# Patient Record
Sex: FEMALE | Race: ASIAN | NOT HISPANIC OR LATINO
[De-identification: names, ages, dates, MRNs, and addresses within clinical notes are randomized per-mention and may not be internally consistent; named-entity substitution may affect disease eponyms.]

---

## 2018-03-21 ENCOUNTER — LABORATORY RESULT (OUTPATIENT)
Age: 48
End: 2018-03-21

## 2018-03-22 ENCOUNTER — ASOB RESULT (OUTPATIENT)
Age: 48
End: 2018-03-22

## 2018-03-22 ENCOUNTER — APPOINTMENT (OUTPATIENT)
Dept: OBGYN | Facility: CLINIC | Age: 48
End: 2018-03-22
Payer: COMMERCIAL

## 2018-03-22 VITALS
HEIGHT: 64 IN | BODY MASS INDEX: 26.12 KG/M2 | WEIGHT: 153 LBS | DIASTOLIC BLOOD PRESSURE: 80 MMHG | SYSTOLIC BLOOD PRESSURE: 130 MMHG

## 2018-03-22 DIAGNOSIS — R10.2 PELVIC AND PERINEAL PAIN: ICD-10-CM

## 2018-03-22 PROCEDURE — 99396 PREV VISIT EST AGE 40-64: CPT

## 2018-03-22 PROCEDURE — 76830 TRANSVAGINAL US NON-OB: CPT

## 2018-03-22 PROCEDURE — 99212 OFFICE O/P EST SF 10 MIN: CPT | Mod: 25

## 2018-03-28 ENCOUNTER — APPOINTMENT (OUTPATIENT)
Dept: OBGYN | Facility: CLINIC | Age: 48
End: 2018-03-28
Payer: COMMERCIAL

## 2018-03-28 PROCEDURE — 51741 ELECTRO-UROFLOWMETRY FIRST: CPT

## 2018-03-28 PROCEDURE — 51784 ANAL/URINARY MUSCLE STUDY: CPT

## 2018-03-28 PROCEDURE — 51729 CYSTOMETROGRAM W/VP&UP: CPT

## 2018-04-25 ENCOUNTER — APPOINTMENT (OUTPATIENT)
Dept: OBGYN | Facility: CLINIC | Age: 48
End: 2018-04-25
Payer: COMMERCIAL

## 2018-04-25 VITALS
DIASTOLIC BLOOD PRESSURE: 80 MMHG | HEIGHT: 64 IN | BODY MASS INDEX: 26.12 KG/M2 | SYSTOLIC BLOOD PRESSURE: 120 MMHG | WEIGHT: 153 LBS

## 2018-04-25 DIAGNOSIS — N39.46 MIXED INCONTINENCE: ICD-10-CM

## 2018-04-25 PROCEDURE — 99213 OFFICE O/P EST LOW 20 MIN: CPT

## 2018-04-25 RX ORDER — DARIFENACIN 7.5 MG/1
7.5 TABLET, EXTENDED RELEASE ORAL
Qty: 1 | Refills: 6 | Status: ACTIVE | COMMUNITY
Start: 2018-04-25 | End: 1900-01-01

## 2019-03-14 ENCOUNTER — APPOINTMENT (OUTPATIENT)
Dept: OBGYN | Facility: CLINIC | Age: 49
End: 2019-03-14

## 2019-03-28 ENCOUNTER — EMERGENCY (EMERGENCY)
Facility: HOSPITAL | Age: 49
LOS: 1 days | Discharge: HOME | End: 2019-03-28
Attending: EMERGENCY MEDICINE
Payer: COMMERCIAL

## 2019-03-28 VITALS
SYSTOLIC BLOOD PRESSURE: 168 MMHG | TEMPERATURE: 98 F | RESPIRATION RATE: 19 BRPM | WEIGHT: 149.91 LBS | DIASTOLIC BLOOD PRESSURE: 98 MMHG | HEART RATE: 90 BPM | OXYGEN SATURATION: 98 % | HEIGHT: 62 IN

## 2019-03-28 VITALS
SYSTOLIC BLOOD PRESSURE: 152 MMHG | DIASTOLIC BLOOD PRESSURE: 88 MMHG | TEMPERATURE: 98 F | OXYGEN SATURATION: 99 % | RESPIRATION RATE: 18 BRPM | HEART RATE: 82 BPM

## 2019-03-28 LAB
ALBUMIN SERPL ELPH-MCNC: 3.2 G/DL — LOW (ref 3.5–5)
ALP SERPL-CCNC: 100 U/L — SIGNIFICANT CHANGE UP (ref 40–120)
ALT FLD-CCNC: 21 U/L DA — SIGNIFICANT CHANGE UP (ref 10–60)
ANION GAP SERPL CALC-SCNC: 10 MMOL/L — SIGNIFICANT CHANGE UP (ref 5–17)
APPEARANCE UR: ABNORMAL
AST SERPL-CCNC: 19 U/L — SIGNIFICANT CHANGE UP (ref 10–40)
BASOPHILS # BLD AUTO: 0.09 K/UL — SIGNIFICANT CHANGE UP (ref 0–0.2)
BASOPHILS NFR BLD AUTO: 0.5 % — SIGNIFICANT CHANGE UP (ref 0–2)
BILIRUB SERPL-MCNC: 0.6 MG/DL — SIGNIFICANT CHANGE UP (ref 0.2–1.2)
BILIRUB UR-MCNC: NEGATIVE — SIGNIFICANT CHANGE UP
BUN SERPL-MCNC: 13 MG/DL — SIGNIFICANT CHANGE UP (ref 7–18)
CALCIUM SERPL-MCNC: 8.4 MG/DL — SIGNIFICANT CHANGE UP (ref 8.4–10.5)
CHLORIDE SERPL-SCNC: 102 MMOL/L — SIGNIFICANT CHANGE UP (ref 96–108)
CO2 SERPL-SCNC: 23 MMOL/L — SIGNIFICANT CHANGE UP (ref 22–31)
COLOR SPEC: YELLOW — SIGNIFICANT CHANGE UP
CREAT SERPL-MCNC: 1.25 MG/DL — SIGNIFICANT CHANGE UP (ref 0.5–1.3)
DIFF PNL FLD: ABNORMAL
EOSINOPHIL # BLD AUTO: 0.03 K/UL — SIGNIFICANT CHANGE UP (ref 0–0.5)
EOSINOPHIL NFR BLD AUTO: 0.2 % — SIGNIFICANT CHANGE UP (ref 0–6)
GLUCOSE SERPL-MCNC: 225 MG/DL — HIGH (ref 70–99)
GLUCOSE UR QL: 250
HCG SERPL-ACNC: <1 MIU/ML — SIGNIFICANT CHANGE UP
HCT VFR BLD CALC: 36.2 % — SIGNIFICANT CHANGE UP (ref 34.5–45)
HGB BLD-MCNC: 11.8 G/DL — SIGNIFICANT CHANGE UP (ref 11.5–15.5)
IMM GRANULOCYTES NFR BLD AUTO: 0.6 % — SIGNIFICANT CHANGE UP (ref 0–1.5)
KETONES UR-MCNC: ABNORMAL
LEUKOCYTE ESTERASE UR-ACNC: NEGATIVE — SIGNIFICANT CHANGE UP
LYMPHOCYTES # BLD AUTO: 1.96 K/UL — SIGNIFICANT CHANGE UP (ref 1–3.3)
LYMPHOCYTES # BLD AUTO: 10.4 % — LOW (ref 13–44)
MCHC RBC-ENTMCNC: 27.8 PG — SIGNIFICANT CHANGE UP (ref 27–34)
MCHC RBC-ENTMCNC: 32.6 GM/DL — SIGNIFICANT CHANGE UP (ref 32–36)
MCV RBC AUTO: 85.2 FL — SIGNIFICANT CHANGE UP (ref 80–100)
MONOCYTES # BLD AUTO: 0.57 K/UL — SIGNIFICANT CHANGE UP (ref 0–0.9)
MONOCYTES NFR BLD AUTO: 3 % — SIGNIFICANT CHANGE UP (ref 2–14)
NEUTROPHILS # BLD AUTO: 16.15 K/UL — HIGH (ref 1.8–7.4)
NEUTROPHILS NFR BLD AUTO: 85.3 % — HIGH (ref 43–77)
NITRITE UR-MCNC: NEGATIVE — SIGNIFICANT CHANGE UP
NRBC # BLD: 0 /100 WBCS — SIGNIFICANT CHANGE UP (ref 0–0)
PH UR: 5 — SIGNIFICANT CHANGE UP (ref 5–8)
PLATELET # BLD AUTO: 337 K/UL — SIGNIFICANT CHANGE UP (ref 150–400)
POTASSIUM SERPL-MCNC: 4 MMOL/L — SIGNIFICANT CHANGE UP (ref 3.5–5.3)
POTASSIUM SERPL-SCNC: 4 MMOL/L — SIGNIFICANT CHANGE UP (ref 3.5–5.3)
PROT SERPL-MCNC: 7.7 G/DL — SIGNIFICANT CHANGE UP (ref 6–8.3)
PROT UR-MCNC: 30 MG/DL
RBC # BLD: 4.25 M/UL — SIGNIFICANT CHANGE UP (ref 3.8–5.2)
RBC # FLD: 12.9 % — SIGNIFICANT CHANGE UP (ref 10.3–14.5)
SODIUM SERPL-SCNC: 135 MMOL/L — SIGNIFICANT CHANGE UP (ref 135–145)
SP GR SPEC: 1.02 — SIGNIFICANT CHANGE UP (ref 1.01–1.02)
UROBILINOGEN FLD QL: NEGATIVE — SIGNIFICANT CHANGE UP
WBC # BLD: 18.91 K/UL — HIGH (ref 3.8–10.5)
WBC # FLD AUTO: 18.91 K/UL — HIGH (ref 3.8–10.5)

## 2019-03-28 PROCEDURE — 96376 TX/PRO/DX INJ SAME DRUG ADON: CPT

## 2019-03-28 PROCEDURE — 80053 COMPREHEN METABOLIC PANEL: CPT

## 2019-03-28 PROCEDURE — 74177 CT ABD & PELVIS W/CONTRAST: CPT | Mod: 26

## 2019-03-28 PROCEDURE — 81001 URINALYSIS AUTO W/SCOPE: CPT

## 2019-03-28 PROCEDURE — 74177 CT ABD & PELVIS W/CONTRAST: CPT

## 2019-03-28 PROCEDURE — 99284 EMERGENCY DEPT VISIT MOD MDM: CPT | Mod: 25

## 2019-03-28 PROCEDURE — 36415 COLL VENOUS BLD VENIPUNCTURE: CPT

## 2019-03-28 PROCEDURE — 96375 TX/PRO/DX INJ NEW DRUG ADDON: CPT

## 2019-03-28 PROCEDURE — 84702 CHORIONIC GONADOTROPIN TEST: CPT

## 2019-03-28 PROCEDURE — 96374 THER/PROPH/DIAG INJ IV PUSH: CPT | Mod: XU

## 2019-03-28 PROCEDURE — 85027 COMPLETE CBC AUTOMATED: CPT

## 2019-03-28 PROCEDURE — 99284 EMERGENCY DEPT VISIT MOD MDM: CPT

## 2019-03-28 RX ORDER — TAMSULOSIN HYDROCHLORIDE 0.4 MG/1
1 CAPSULE ORAL
Qty: 21 | Refills: 0
Start: 2019-03-28 | End: 2019-04-17

## 2019-03-28 RX ORDER — HYDROMORPHONE HYDROCHLORIDE 2 MG/ML
0.5 INJECTION INTRAMUSCULAR; INTRAVENOUS; SUBCUTANEOUS ONCE
Qty: 0 | Refills: 0 | Status: DISCONTINUED | OUTPATIENT
Start: 2019-03-28 | End: 2019-03-28

## 2019-03-28 RX ORDER — SODIUM CHLORIDE 9 MG/ML
1000 INJECTION INTRAMUSCULAR; INTRAVENOUS; SUBCUTANEOUS ONCE
Qty: 0 | Refills: 0 | Status: COMPLETED | OUTPATIENT
Start: 2019-03-28 | End: 2019-03-28

## 2019-03-28 RX ORDER — MORPHINE SULFATE 50 MG/1
4 CAPSULE, EXTENDED RELEASE ORAL ONCE
Qty: 0 | Refills: 0 | Status: DISCONTINUED | OUTPATIENT
Start: 2019-03-28 | End: 2019-03-28

## 2019-03-28 RX ORDER — HYDROMORPHONE HYDROCHLORIDE 2 MG/ML
1 INJECTION INTRAMUSCULAR; INTRAVENOUS; SUBCUTANEOUS ONCE
Qty: 0 | Refills: 0 | Status: DISCONTINUED | OUTPATIENT
Start: 2019-03-28 | End: 2019-03-28

## 2019-03-28 RX ORDER — ONDANSETRON 8 MG/1
4 TABLET, FILM COATED ORAL ONCE
Qty: 0 | Refills: 0 | Status: COMPLETED | OUTPATIENT
Start: 2019-03-28 | End: 2019-03-28

## 2019-03-28 RX ADMIN — HYDROMORPHONE HYDROCHLORIDE 0.5 MILLIGRAM(S): 2 INJECTION INTRAMUSCULAR; INTRAVENOUS; SUBCUTANEOUS at 23:54

## 2019-03-28 RX ADMIN — SODIUM CHLORIDE 1000 MILLILITER(S): 9 INJECTION INTRAMUSCULAR; INTRAVENOUS; SUBCUTANEOUS at 20:50

## 2019-03-28 RX ADMIN — HYDROMORPHONE HYDROCHLORIDE 1 MILLIGRAM(S): 2 INJECTION INTRAMUSCULAR; INTRAVENOUS; SUBCUTANEOUS at 23:07

## 2019-03-28 RX ADMIN — MORPHINE SULFATE 4 MILLIGRAM(S): 50 CAPSULE, EXTENDED RELEASE ORAL at 20:44

## 2019-03-28 RX ADMIN — ONDANSETRON 4 MILLIGRAM(S): 8 TABLET, FILM COATED ORAL at 20:46

## 2019-03-28 RX ADMIN — HYDROMORPHONE HYDROCHLORIDE 0.5 MILLIGRAM(S): 2 INJECTION INTRAMUSCULAR; INTRAVENOUS; SUBCUTANEOUS at 21:27

## 2019-03-28 RX ADMIN — MORPHINE SULFATE 4 MILLIGRAM(S): 50 CAPSULE, EXTENDED RELEASE ORAL at 23:04

## 2019-03-28 RX ADMIN — HYDROMORPHONE HYDROCHLORIDE 1 MILLIGRAM(S): 2 INJECTION INTRAMUSCULAR; INTRAVENOUS; SUBCUTANEOUS at 23:54

## 2019-03-28 NOTE — ED PROVIDER NOTE - CLINICAL SUMMARY MEDICAL DECISION MAKING FREE TEXT BOX
49 y/o F with a PMHx of DM presents to the ED with L flank pain, urinary urgency, and nausea x today. Will r/o stone, labs, CT scan, give fluids, and reassess.

## 2019-03-28 NOTE — ED PROVIDER NOTE - OBJECTIVE STATEMENT
47 y/o F with a significant PMHx of DM and no significant PSHx presents to the ED with c/o L flank pain x today with urinary urgency and nausea. Pt denies hematuria, vomiting, or any other complaints. NKDA.

## 2019-03-28 NOTE — ED ADULT NURSE NOTE - NSIMPLEMENTINTERV_GEN_ALL_ED
Implemented All Universal Safety Interventions:  [place] to call system. Call bell, personal items and telephone within reach. Instruct patient to call for assistance. Room bathroom lighting operational. Non-slip footwear when patient is off stretcher. Physically safe environment: no spills, clutter or unnecessary equipment. Stretcher in lowest position, wheels locked, appropriate side rails in place.

## 2019-04-13 PROBLEM — E11.9 TYPE 2 DIABETES MELLITUS WITHOUT COMPLICATIONS: Chronic | Status: ACTIVE | Noted: 2019-03-28

## 2019-04-16 ENCOUNTER — LABORATORY RESULT (OUTPATIENT)
Age: 49
End: 2019-04-16

## 2019-04-17 ENCOUNTER — APPOINTMENT (OUTPATIENT)
Dept: OBGYN | Facility: CLINIC | Age: 49
End: 2019-04-17
Payer: COMMERCIAL

## 2019-04-17 VITALS — SYSTOLIC BLOOD PRESSURE: 130 MMHG | DIASTOLIC BLOOD PRESSURE: 80 MMHG | WEIGHT: 149 LBS | BODY MASS INDEX: 25.58 KG/M2

## 2019-04-17 PROCEDURE — 99396 PREV VISIT EST AGE 40-64: CPT

## 2019-04-17 NOTE — COUNSELING
[Breast Self Exam] : breast self exam [Nutrition] : nutrition [Vitamins/Supplements] : vitamins/supplements [Exercise] : exercise [STD (testing, results, tx)] : STD (testing, results, tx) [Bladder Hygiene] : bladder hygiene [Vaccines] : vaccines

## 2019-04-17 NOTE — PHYSICAL EXAM
[Awake] : awake [Acute Distress] : no acute distress [Alert] : alert [Mass] : no breast mass [Nipple Discharge] : no nipple discharge [Soft] : soft [Axillary LAD] : no axillary lymphadenopathy [Tender] : non tender [Oriented x3] : oriented to person, place, and time [Normal] : uterus [No Bleeding] : there was no active vaginal bleeding [Uterine Adnexae] : were not tender and not enlarged

## 2019-04-18 ENCOUNTER — APPOINTMENT (OUTPATIENT)
Dept: OBGYN | Facility: CLINIC | Age: 49
End: 2019-04-18
Payer: COMMERCIAL

## 2019-04-18 ENCOUNTER — ASOB RESULT (OUTPATIENT)
Age: 49
End: 2019-04-18

## 2019-04-18 ENCOUNTER — APPOINTMENT (OUTPATIENT)
Dept: OBGYN | Facility: CLINIC | Age: 49
End: 2019-04-18

## 2019-04-18 PROCEDURE — 76830 TRANSVAGINAL US NON-OB: CPT

## 2019-04-24 ENCOUNTER — INPATIENT (INPATIENT)
Facility: HOSPITAL | Age: 49
LOS: 0 days | Discharge: HOME | DRG: 392 | End: 2019-04-25
Attending: INTERNAL MEDICINE | Admitting: INTERNAL MEDICINE
Payer: COMMERCIAL

## 2019-04-24 VITALS
OXYGEN SATURATION: 100 % | HEART RATE: 77 BPM | HEIGHT: 62 IN | TEMPERATURE: 98 F | SYSTOLIC BLOOD PRESSURE: 153 MMHG | DIASTOLIC BLOOD PRESSURE: 91 MMHG | RESPIRATION RATE: 18 BRPM | WEIGHT: 149.91 LBS

## 2019-04-24 DIAGNOSIS — R07.9 CHEST PAIN, UNSPECIFIED: ICD-10-CM

## 2019-04-24 LAB
ANION GAP SERPL CALC-SCNC: 6 MMOL/L — SIGNIFICANT CHANGE UP (ref 5–17)
BASOPHILS # BLD AUTO: 0.14 K/UL — SIGNIFICANT CHANGE UP (ref 0–0.2)
BASOPHILS NFR BLD AUTO: 1.5 % — SIGNIFICANT CHANGE UP (ref 0–2)
BUN SERPL-MCNC: 25 MG/DL — HIGH (ref 7–18)
CALCIUM SERPL-MCNC: 8.5 MG/DL — SIGNIFICANT CHANGE UP (ref 8.4–10.5)
CHLORIDE SERPL-SCNC: 107 MMOL/L — SIGNIFICANT CHANGE UP (ref 96–108)
CK MB BLD-MCNC: <1.5 % — SIGNIFICANT CHANGE UP (ref 0–3.5)
CK MB CFR SERPL CALC: <1 NG/ML — SIGNIFICANT CHANGE UP (ref 0–3.6)
CK SERPL-CCNC: 66 U/L — SIGNIFICANT CHANGE UP (ref 21–215)
CO2 SERPL-SCNC: 28 MMOL/L — SIGNIFICANT CHANGE UP (ref 22–31)
CREAT SERPL-MCNC: 1.03 MG/DL — SIGNIFICANT CHANGE UP (ref 0.5–1.3)
EOSINOPHIL # BLD AUTO: 0.51 K/UL — HIGH (ref 0–0.5)
EOSINOPHIL NFR BLD AUTO: 5.5 % — SIGNIFICANT CHANGE UP (ref 0–6)
GLUCOSE SERPL-MCNC: 136 MG/DL — HIGH (ref 70–99)
HCG SERPL-ACNC: 2 MIU/ML — SIGNIFICANT CHANGE UP
HCT VFR BLD CALC: 35.7 % — SIGNIFICANT CHANGE UP (ref 34.5–45)
HGB BLD-MCNC: 11.2 G/DL — LOW (ref 11.5–15.5)
IMM GRANULOCYTES NFR BLD AUTO: 0.2 % — SIGNIFICANT CHANGE UP (ref 0–1.5)
LYMPHOCYTES # BLD AUTO: 3.9 K/UL — HIGH (ref 1–3.3)
LYMPHOCYTES # BLD AUTO: 42.4 % — SIGNIFICANT CHANGE UP (ref 13–44)
MCHC RBC-ENTMCNC: 27.8 PG — SIGNIFICANT CHANGE UP (ref 27–34)
MCHC RBC-ENTMCNC: 31.4 GM/DL — LOW (ref 32–36)
MCV RBC AUTO: 88.6 FL — SIGNIFICANT CHANGE UP (ref 80–100)
MONOCYTES # BLD AUTO: 0.52 K/UL — SIGNIFICANT CHANGE UP (ref 0–0.9)
MONOCYTES NFR BLD AUTO: 5.7 % — SIGNIFICANT CHANGE UP (ref 2–14)
NEUTROPHILS # BLD AUTO: 4.11 K/UL — SIGNIFICANT CHANGE UP (ref 1.8–7.4)
NEUTROPHILS NFR BLD AUTO: 44.7 % — SIGNIFICANT CHANGE UP (ref 43–77)
NRBC # BLD: 0 /100 WBCS — SIGNIFICANT CHANGE UP (ref 0–0)
PLATELET # BLD AUTO: 331 K/UL — SIGNIFICANT CHANGE UP (ref 150–400)
POTASSIUM SERPL-MCNC: 4.3 MMOL/L — SIGNIFICANT CHANGE UP (ref 3.5–5.3)
POTASSIUM SERPL-SCNC: 4.3 MMOL/L — SIGNIFICANT CHANGE UP (ref 3.5–5.3)
RBC # BLD: 4.03 M/UL — SIGNIFICANT CHANGE UP (ref 3.8–5.2)
RBC # FLD: 13.2 % — SIGNIFICANT CHANGE UP (ref 10.3–14.5)
SODIUM SERPL-SCNC: 141 MMOL/L — SIGNIFICANT CHANGE UP (ref 135–145)
TROPONIN I SERPL-MCNC: <0.015 NG/ML — SIGNIFICANT CHANGE UP (ref 0–0.04)
WBC # BLD: 9.2 K/UL — SIGNIFICANT CHANGE UP (ref 3.8–10.5)
WBC # FLD AUTO: 9.2 K/UL — SIGNIFICANT CHANGE UP (ref 3.8–10.5)

## 2019-04-24 PROCEDURE — 71046 X-RAY EXAM CHEST 2 VIEWS: CPT | Mod: 26

## 2019-04-24 PROCEDURE — 99284 EMERGENCY DEPT VISIT MOD MDM: CPT

## 2019-04-24 RX ORDER — TAMSULOSIN HYDROCHLORIDE 0.4 MG/1
0.4 CAPSULE ORAL AT BEDTIME
Qty: 0 | Refills: 0 | Status: DISCONTINUED | OUTPATIENT
Start: 2019-04-24 | End: 2019-04-25

## 2019-04-24 RX ORDER — ATORVASTATIN CALCIUM 80 MG/1
40 TABLET, FILM COATED ORAL AT BEDTIME
Qty: 0 | Refills: 0 | Status: DISCONTINUED | OUTPATIENT
Start: 2019-04-24 | End: 2019-04-25

## 2019-04-24 RX ORDER — NITROGLYCERIN 6.5 MG
0.5 CAPSULE, EXTENDED RELEASE ORAL DAILY
Qty: 0 | Refills: 0 | Status: DISCONTINUED | OUTPATIENT
Start: 2019-04-24 | End: 2019-04-25

## 2019-04-24 RX ORDER — ASPIRIN/CALCIUM CARB/MAGNESIUM 324 MG
81 TABLET ORAL DAILY
Qty: 0 | Refills: 0 | Status: DISCONTINUED | OUTPATIENT
Start: 2019-04-24 | End: 2019-04-25

## 2019-04-24 RX ADMIN — Medication 0.5 INCH(S): at 21:24

## 2019-04-24 NOTE — H&P ADULT - ASSESSMENT
Pt is a 47 y/o F with a PMHx of DM, Gout, Htn, renal stones kidney stones and PSHx of  presents to ED c/o intermittent chest pain x 4 days.Pt describes pain as dull, pressure like, 7/10 intensity, no radiation. Pt denies any previous history od similar pain, sob, palpitation, burning sensation, cough or leg swelling. Pt also has h/o diabetes currently on oral medications, was recently started on blood pressure medications by PCP that were later discontinued. Pt was also diagnosed with Gout 1 year ago and was started on alopurinol. Pt has one episiode of renal colic in march and was found to have UPJ stone 2 mm on left side with hydroureter. Pt recently had URTI and sinusitus and was started on antibiotics on  for 10 days. Pt denies any sinus pain or headache for now.  In ED pt was vitally stable, started on pain medication and telemonitor. Pt is being admitted for ACS workup

## 2019-04-24 NOTE — ED PROVIDER NOTE - OBJECTIVE STATEMENT
49 y/o F with a PMHx of DM, kidney stone (dx 1 month ago), PNA and a PSHx of  presents to ED c/o intermittent chest pain x 1 week. Denies any aggravating/relieving factors. Sometimes pain at rest. PMD is out of town, went to visiting PMD, was told she had high BP pt was given medications and told to come to ED for further evaluation. NKDA.

## 2019-04-24 NOTE — H&P ADULT - PROBLEM SELECTOR PLAN 3
Pt had 1 episode of renal colic previously and was started on tamsulosin  2mm stone at [facility] unchanged from March 2019  Mild left Hydroureter  c/w tamsulosin  Nutritional consult for Gout  Pt follows Junie Grubbs as outpatient Pt had 1 episode of renal colic previously and was started on tamsulosin  2mm stone at [facility]   c/w tamsulosin  Nutritional consult for Gout  Pt follows Junie Grubbs as outpatient

## 2019-04-24 NOTE — ED PROVIDER NOTE - PROGRESS NOTE DETAILS
Case discussed with Dr. Huang who saw patient in clinic today because the patients regular PMD is out of town.  BP in office had a diastolic over 100.  BP in /91.  Will admit for further workup.

## 2019-04-24 NOTE — ED ADULT TRIAGE NOTE - CHIEF COMPLAINT QUOTE
patient reports chest pain and high bp x yesterday. patient went to pcp and was given 2 81mg ASA and Nebivolol 5mg

## 2019-04-24 NOTE — H&P ADULT - NEGATIVE CARDIOVASCULAR SYMPTOMS
no palpitations/no dyspnea on exertion/no orthopnea/no peripheral edema/no paroxysmal nocturnal dyspnea/no claudication

## 2019-04-24 NOTE — ED PROVIDER NOTE - CLINICAL SUMMARY MEDICAL DECISION MAKING FREE TEXT BOX
Pt presenting with atypical chest pain, never has had cardiac work-up, stress test (ECHO). Order labs, chest x-ray. Pt already took Aspirin. Reassess and likely admission.

## 2019-04-24 NOTE — H&P ADULT - ATTENDING COMMENTS
Patient  examined full repert to follow    Precordial pain  r/o ACS    HTN  HLD   DM   GOUT    Nephrolithiasis    Plan admit to telemetry as per  protocol Cardiology called  start Ntg ointment  aspirin Bystolic was given FS monitoring    GI DVT prophylaxis Pt is a 49 y/o F with a PMHx of DM, Gout, Htn, renal stones kidney stones and PSHx of  presents to ED c/o intermittent chest pain x 4 days.Pt describes pain as dull, pressure like, 7/10 intensity, no radiation. Pt denies any previous history od similar pain, sob, palpitation, burning sensation, cough or leg swelling. Pt also has h/o diabetes currently on oral medications, was recently started on blood pressure medications by PCP that were later discontinued  Seen in my office  noted  with Uncontrolled  blood  pressure  and  precordial chest pain   Blood  tests  reviewed    Precordial pain  r/o ACS    HTN  HLD   DM   GOUT    Nephrolithiasis    Plan admit to telemetry as per  protocol Cardiology called  start Ntg ointment  aspirin Bystolic was given FS monitoring    GI DVT prophylaxis

## 2019-04-24 NOTE — H&P ADULT - PROBLEM SELECTOR PLAN 1
Will workup for ACS  T1 negative  F/u T2  Echo  Aspirin, statin   Cardiology consult Will workup for ACS  T1 negative  F/u T2  Echo  Aspirin, statin ,bb  Cardiology consult

## 2019-04-24 NOTE — ED ADULT NURSE NOTE - OBJECTIVE STATEMENT
pt came in for c/o L sided cp w/ radiation to left upper back. pt denies sob, fever, chills, cough. Breathing unlabored. NAD.

## 2019-04-25 ENCOUNTER — TRANSCRIPTION ENCOUNTER (OUTPATIENT)
Age: 49
End: 2019-04-25

## 2019-04-25 VITALS
TEMPERATURE: 98 F | OXYGEN SATURATION: 98 % | DIASTOLIC BLOOD PRESSURE: 69 MMHG | HEART RATE: 71 BPM | RESPIRATION RATE: 18 BRPM | SYSTOLIC BLOOD PRESSURE: 116 MMHG

## 2019-04-25 DIAGNOSIS — N20.0 CALCULUS OF KIDNEY: ICD-10-CM

## 2019-04-25 DIAGNOSIS — R07.9 CHEST PAIN, UNSPECIFIED: ICD-10-CM

## 2019-04-25 DIAGNOSIS — E11.9 TYPE 2 DIABETES MELLITUS WITHOUT COMPLICATIONS: ICD-10-CM

## 2019-04-25 DIAGNOSIS — M10.9 GOUT, UNSPECIFIED: ICD-10-CM

## 2019-04-25 DIAGNOSIS — Z29.9 ENCOUNTER FOR PROPHYLACTIC MEASURES, UNSPECIFIED: ICD-10-CM

## 2019-04-25 LAB
ALBUMIN SERPL ELPH-MCNC: 3.2 G/DL — LOW (ref 3.5–5)
ALP SERPL-CCNC: 86 U/L — SIGNIFICANT CHANGE UP (ref 40–120)
ALT FLD-CCNC: 42 U/L DA — SIGNIFICANT CHANGE UP (ref 10–60)
ANION GAP SERPL CALC-SCNC: 7 MMOL/L — SIGNIFICANT CHANGE UP (ref 5–17)
APTT BLD: 32.9 SEC — SIGNIFICANT CHANGE UP (ref 27.5–36.3)
AST SERPL-CCNC: 26 U/L — SIGNIFICANT CHANGE UP (ref 10–40)
BILIRUB SERPL-MCNC: 0.6 MG/DL — SIGNIFICANT CHANGE UP (ref 0.2–1.2)
BUN SERPL-MCNC: 25 MG/DL — HIGH (ref 7–18)
CALCIUM SERPL-MCNC: 8.6 MG/DL — SIGNIFICANT CHANGE UP (ref 8.4–10.5)
CHLORIDE SERPL-SCNC: 106 MMOL/L — SIGNIFICANT CHANGE UP (ref 96–108)
CHOLEST SERPL-MCNC: 238 MG/DL — HIGH (ref 10–199)
CK MB BLD-MCNC: <1.5 % — SIGNIFICANT CHANGE UP (ref 0–3.5)
CK MB BLD-MCNC: <1.7 % — SIGNIFICANT CHANGE UP (ref 0–3.5)
CK MB CFR SERPL CALC: <1 NG/ML — SIGNIFICANT CHANGE UP (ref 0–3.6)
CK MB CFR SERPL CALC: <1 NG/ML — SIGNIFICANT CHANGE UP (ref 0–3.6)
CK SERPL-CCNC: 58 U/L — SIGNIFICANT CHANGE UP (ref 21–215)
CK SERPL-CCNC: 66 U/L — SIGNIFICANT CHANGE UP (ref 21–215)
CO2 SERPL-SCNC: 27 MMOL/L — SIGNIFICANT CHANGE UP (ref 22–31)
CREAT SERPL-MCNC: 1.03 MG/DL — SIGNIFICANT CHANGE UP (ref 0.5–1.3)
GLUCOSE BLDC GLUCOMTR-MCNC: 165 MG/DL — HIGH (ref 70–99)
GLUCOSE BLDC GLUCOMTR-MCNC: 180 MG/DL — HIGH (ref 70–99)
GLUCOSE BLDC GLUCOMTR-MCNC: 198 MG/DL — HIGH (ref 70–99)
GLUCOSE SERPL-MCNC: 189 MG/DL — HIGH (ref 70–99)
HBA1C BLD-MCNC: 8.8 % — HIGH (ref 4–5.6)
HCT VFR BLD CALC: 34.4 % — LOW (ref 34.5–45)
HDLC SERPL-MCNC: 49 MG/DL — LOW
HGB BLD-MCNC: 10.8 G/DL — LOW (ref 11.5–15.5)
LIPID PNL WITH DIRECT LDL SERPL: 123 MG/DL — SIGNIFICANT CHANGE UP
MCHC RBC-ENTMCNC: 28.1 PG — SIGNIFICANT CHANGE UP (ref 27–34)
MCHC RBC-ENTMCNC: 31.4 GM/DL — LOW (ref 32–36)
MCV RBC AUTO: 89.6 FL — SIGNIFICANT CHANGE UP (ref 80–100)
NRBC # BLD: 0 /100 WBCS — SIGNIFICANT CHANGE UP (ref 0–0)
PLATELET # BLD AUTO: 321 K/UL — SIGNIFICANT CHANGE UP (ref 150–400)
POTASSIUM SERPL-MCNC: 4.2 MMOL/L — SIGNIFICANT CHANGE UP (ref 3.5–5.3)
POTASSIUM SERPL-SCNC: 4.2 MMOL/L — SIGNIFICANT CHANGE UP (ref 3.5–5.3)
PROT SERPL-MCNC: 6.9 G/DL — SIGNIFICANT CHANGE UP (ref 6–8.3)
RBC # BLD: 3.84 M/UL — SIGNIFICANT CHANGE UP (ref 3.8–5.2)
RBC # FLD: 13.4 % — SIGNIFICANT CHANGE UP (ref 10.3–14.5)
SODIUM SERPL-SCNC: 140 MMOL/L — SIGNIFICANT CHANGE UP (ref 135–145)
TOTAL CHOLESTEROL/HDL RATIO MEASUREMENT: 4.9 RATIO — SIGNIFICANT CHANGE UP (ref 3.3–7.1)
TRIGL SERPL-MCNC: 331 MG/DL — HIGH (ref 10–149)
TROPONIN I SERPL-MCNC: <0.015 NG/ML — SIGNIFICANT CHANGE UP (ref 0–0.04)
TROPONIN I SERPL-MCNC: <0.015 NG/ML — SIGNIFICANT CHANGE UP (ref 0–0.04)
TSH SERPL-MCNC: 4.21 UU/ML — SIGNIFICANT CHANGE UP (ref 0.34–4.82)
WBC # BLD: 10.15 K/UL — SIGNIFICANT CHANGE UP (ref 3.8–10.5)
WBC # FLD AUTO: 10.15 K/UL — SIGNIFICANT CHANGE UP (ref 3.8–10.5)

## 2019-04-25 PROCEDURE — 93306 TTE W/DOPPLER COMPLETE: CPT

## 2019-04-25 PROCEDURE — 84702 CHORIONIC GONADOTROPIN TEST: CPT

## 2019-04-25 PROCEDURE — 82962 GLUCOSE BLOOD TEST: CPT

## 2019-04-25 PROCEDURE — 93306 TTE W/DOPPLER COMPLETE: CPT | Mod: 26

## 2019-04-25 PROCEDURE — 82550 ASSAY OF CK (CPK): CPT

## 2019-04-25 PROCEDURE — 71046 X-RAY EXAM CHEST 2 VIEWS: CPT

## 2019-04-25 PROCEDURE — 80053 COMPREHEN METABOLIC PANEL: CPT

## 2019-04-25 PROCEDURE — 85027 COMPLETE CBC AUTOMATED: CPT

## 2019-04-25 PROCEDURE — 82553 CREATINE MB FRACTION: CPT

## 2019-04-25 PROCEDURE — 85730 THROMBOPLASTIN TIME PARTIAL: CPT

## 2019-04-25 PROCEDURE — 80061 LIPID PANEL: CPT

## 2019-04-25 PROCEDURE — 84484 ASSAY OF TROPONIN QUANT: CPT

## 2019-04-25 PROCEDURE — 78452 HT MUSCLE IMAGE SPECT MULT: CPT

## 2019-04-25 PROCEDURE — 84443 ASSAY THYROID STIM HORMONE: CPT

## 2019-04-25 PROCEDURE — 93005 ELECTROCARDIOGRAM TRACING: CPT

## 2019-04-25 PROCEDURE — 83036 HEMOGLOBIN GLYCOSYLATED A1C: CPT

## 2019-04-25 PROCEDURE — 80048 BASIC METABOLIC PNL TOTAL CA: CPT

## 2019-04-25 PROCEDURE — 99285 EMERGENCY DEPT VISIT HI MDM: CPT | Mod: 25

## 2019-04-25 PROCEDURE — 36415 COLL VENOUS BLD VENIPUNCTURE: CPT

## 2019-04-25 PROCEDURE — 93017 CV STRESS TEST TRACING ONLY: CPT

## 2019-04-25 PROCEDURE — A9502: CPT

## 2019-04-25 RX ORDER — DEXTROSE 50 % IN WATER 50 %
25 SYRINGE (ML) INTRAVENOUS ONCE
Qty: 0 | Refills: 0 | Status: DISCONTINUED | OUTPATIENT
Start: 2019-04-25 | End: 2019-04-25

## 2019-04-25 RX ORDER — METOPROLOL TARTRATE 50 MG
1 TABLET ORAL
Qty: 15 | Refills: 0
Start: 2019-04-25 | End: 2019-05-09

## 2019-04-25 RX ORDER — DEXTROSE 50 % IN WATER 50 %
15 SYRINGE (ML) INTRAVENOUS ONCE
Qty: 0 | Refills: 0 | Status: DISCONTINUED | OUTPATIENT
Start: 2019-04-25 | End: 2019-04-25

## 2019-04-25 RX ORDER — ATORVASTATIN CALCIUM 80 MG/1
1 TABLET, FILM COATED ORAL
Qty: 20 | Refills: 0
Start: 2019-04-25 | End: 2019-05-14

## 2019-04-25 RX ORDER — DEXTROSE 50 % IN WATER 50 %
12.5 SYRINGE (ML) INTRAVENOUS ONCE
Qty: 0 | Refills: 0 | Status: DISCONTINUED | OUTPATIENT
Start: 2019-04-25 | End: 2019-04-25

## 2019-04-25 RX ORDER — INFLUENZA VIRUS VACCINE 15; 15; 15; 15 UG/.5ML; UG/.5ML; UG/.5ML; UG/.5ML
0.5 SUSPENSION INTRAMUSCULAR ONCE
Qty: 0 | Refills: 0 | Status: COMPLETED | OUTPATIENT
Start: 2019-04-25 | End: 2019-04-25

## 2019-04-25 RX ORDER — METOPROLOL TARTRATE 50 MG
25 TABLET ORAL
Qty: 0 | Refills: 0 | Status: DISCONTINUED | OUTPATIENT
Start: 2019-04-25 | End: 2019-04-25

## 2019-04-25 RX ORDER — ACETAMINOPHEN 500 MG
650 TABLET ORAL ONCE
Qty: 0 | Refills: 0 | Status: COMPLETED | OUTPATIENT
Start: 2019-04-25 | End: 2019-04-25

## 2019-04-25 RX ORDER — SODIUM CHLORIDE 9 MG/ML
1000 INJECTION, SOLUTION INTRAVENOUS
Qty: 0 | Refills: 0 | Status: DISCONTINUED | OUTPATIENT
Start: 2019-04-25 | End: 2019-04-25

## 2019-04-25 RX ORDER — ALLOPURINOL 300 MG
300 TABLET ORAL DAILY
Qty: 0 | Refills: 0 | Status: DISCONTINUED | OUTPATIENT
Start: 2019-04-25 | End: 2019-04-25

## 2019-04-25 RX ORDER — COLCHICINE 0.6 MG
0.6 TABLET ORAL
Qty: 0 | Refills: 0 | Status: DISCONTINUED | OUTPATIENT
Start: 2019-04-25 | End: 2019-04-25

## 2019-04-25 RX ORDER — GLUCAGON INJECTION, SOLUTION 0.5 MG/.1ML
1 INJECTION, SOLUTION SUBCUTANEOUS ONCE
Qty: 0 | Refills: 0 | Status: DISCONTINUED | OUTPATIENT
Start: 2019-04-25 | End: 2019-04-25

## 2019-04-25 RX ORDER — INSULIN LISPRO 100/ML
VIAL (ML) SUBCUTANEOUS
Qty: 0 | Refills: 0 | Status: DISCONTINUED | OUTPATIENT
Start: 2019-04-25 | End: 2019-04-25

## 2019-04-25 RX ADMIN — Medication 0.6 MILLIGRAM(S): at 05:42

## 2019-04-25 RX ADMIN — Medication 25 MILLIGRAM(S): at 17:30

## 2019-04-25 RX ADMIN — Medication 650 MILLIGRAM(S): at 08:40

## 2019-04-25 RX ADMIN — Medication 1: at 17:29

## 2019-04-25 RX ADMIN — Medication 1: at 12:34

## 2019-04-25 RX ADMIN — Medication 0.5 INCH(S): at 09:00

## 2019-04-25 RX ADMIN — Medication 81 MILLIGRAM(S): at 14:21

## 2019-04-25 RX ADMIN — Medication 650 MILLIGRAM(S): at 08:30

## 2019-04-25 RX ADMIN — Medication 0.6 MILLIGRAM(S): at 17:30

## 2019-04-25 RX ADMIN — Medication 300 MILLIGRAM(S): at 14:19

## 2019-04-25 NOTE — DISCHARGE NOTE NURSING/CASE MANAGEMENT/SOCIAL WORK - NSDCDPATPORTLINK_GEN_ALL_CORE
You can access the [facility] Patient Portal, offered by [facility], by registering with the following website: [email or web address]

## 2019-04-25 NOTE — DISCHARGE NOTE PROVIDER - HOSPITAL COURSE
Pt is a 49 y/o F with a PMH of DM, Gout, Htn, renal stones kidney stones and PSH of  who presented to ED with c/o intermittent chest pain x 4 days. Pt describes pain as dull, pressure like, 7/10 intensity, no radiation. Pt also has h/o diabetes currently on oral medications, was recently started on blood pressure medications by PCP that were later discontinued. Pt was also diagnosed with Gout 1 year ago and was started on allopurinol. Pt has one episode of renal colic in march and was found to have UPJ stone 2 mm on left side with hydroureter. Pt recently had URTI and sinusitus and was started on antibiotics on  for 10 days. Patient was admitted for ruling out ACS. EKG showed normal sinus rhythm with premature atrial complexes. Serial cardiac enzymes were normal. CT abdomen pelvis showed left vuj stone with mild hydronephrosis. Cardiology was consulted and stress test was done which was normal. ACS was ruled out. Patient is stable for discharge. Case has been discussed with the attending. This is just a summary of the case. For further information please refer to patient chart document.

## 2019-04-25 NOTE — PROGRESS NOTE ADULT - SUBJECTIVE AND OBJECTIVE BOX
Patient is a 48y old  Female who presents with a chief complaint of chest pain (25 Apr 2019 14:19)      INTERVAL HPI/OVERNIGHT EVENTS:comfortable   improved  blood  pressure  control Cardiology evaluation appreciated Stress tests is  negative     T(C): 36.4 (04-25-19 @ 15:42), Max: 37 (04-25-19 @ 07:25)  HR: 71 (04-25-19 @ 15:42) (71 - 88)  BP: 116/69 (04-25-19 @ 15:42) (101/54 - 134/85)  RR: 18 (04-25-19 @ 15:42) (16 - 19)  SpO2: 98% (04-25-19 @ 15:42) (97% - 100%)  Wt(kg): --Vital Signs Last 24 Hrs  T(C): 36.4 (25 Apr 2019 15:42), Max: 37 (25 Apr 2019 07:25)  T(F): 97.5 (25 Apr 2019 15:42), Max: 98.6 (25 Apr 2019 07:25)  HR: 71 (25 Apr 2019 15:42) (71 - 88)  BP: 116/69 (25 Apr 2019 15:42) (101/54 - 134/85)  BP(mean): --  RR: 18 (25 Apr 2019 15:42) (16 - 19)  SpO2: 98% (25 Apr 2019 15:42) (97% - 100%)  I&O's Summary      LABS:                        10.8   10.15 )-----------( 321      ( 25 Apr 2019 08:36 )             34.4     04-25    140  |  106  |  25<H>  ----------------------------<  189<H>  4.2   |  27  |  1.03    Ca    8.6      25 Apr 2019 08:36    TPro  6.9  /  Alb  3.2<L>  /  TBili  0.6  /  DBili  x   /  AST  26  /  ALT  42  /  AlkPhos  86  04-25    PTT - ( 25 Apr 2019 08:36 )  PTT:32.9 sec    CAPILLARY BLOOD GLUCOSE      POCT Blood Glucose.: 165 mg/dL (25 Apr 2019 16:51)  POCT Blood Glucose.: 180 mg/dL (25 Apr 2019 11:42)  POCT Blood Glucose.: 198 mg/dL (25 Apr 2019 07:36)  MEDICATIONS  (STANDING):  allopurinol 300 milliGRAM(s) Oral daily  aspirin  chewable 81 milliGRAM(s) Oral daily  atorvastatin 40 milliGRAM(s) Oral at bedtime  colchicine 0.6 milliGRAM(s) Oral two times a day  insulin lispro (HumaLOG) corrective regimen sliding scale   SubCutaneous three times a day before meals  metoprolol tartrate 25 milliGRAM(s) Oral two times a day  nitroglycerin    2% Ointment 0.5 Inch(s) Transdermal daily  tamsulosin 0.4 milliGRAM(s) Oral at bedtime    MEDICATIONS  (PRN):  glucagon  Injectable 1 milliGRAM(s) IntraMuscular once PRN Glucose LESS THAN 70 milligrams/deciliter          REVIEW OF SYSTEMS:  CONSTITUTIONAL: No fever, weight loss, or fatigue  EYES: No eye pain, visual disturbances, or discharge  ENMT:  No difficulty hearing, tinnitus, vertigo; No sinus or throat pain  NECK: No pain or stiffness  BREASTS: No pain, masses, or nipple discharge  RESPIRATORY: No cough, wheezing, chills or hemoptysis; No shortness of breath  CARDIOVASCULAR: + chest pain, palpitations, dizziness, or leg swelling  GASTROINTESTINAL: No abdominal or epigastric pain. No nausea, vomiting, or hematemesis; No diarrhea or constipation. No melena or hematochezia.  GENITOURINARY: No dysuria, frequency, hematuria, or incontinence  NEUROLOGICAL: No headaches, memory loss, loss of strength, numbness, or tremors  SKIN: No itching, burning, rashes, or lesions   LYMPH NODES: No enlarged glands  ENDOCRINE: No heat or cold intolerance; No hair loss  MUSCULOSKELETAL: No joint pain or swelling; No muscle, back, or extremity pain  PSYCHIATRIC: No depression, anxiety, mood swings, or difficulty sleeping  HEME/LYMPH: No easy bruising, or bleeding gums  ALLERGY AND IMMUNOLOGIC: No hives or eczema    RADIOLOGY & ADDITIONAL TESTS:    Imaging Personally Reviewed:  [ ] YES  [ ] NO    Consultant(s) Notes Reviewed:  [x ] YES  [ ] NO    PHYSICAL EXAM:  GENERAL: NAD, well-groomed, well-developed  HEAD:  Atraumatic, Normocephalic  EYES: EOMI, PERRLA, conjunctiva and sclera clear  ENMT: No tonsillar erythema, exudates, or enlargement; Moist mucous membranes, Good dentition, No lesions  NECK: Supple, No JVD, Normal thyroid  NERVOUS SYSTEM:  Alert & Oriented X3, Good concentration; Motor Strength 5/5 B/L upper and lower extremities; DTRs 2+ intact and symmetric  CHEST/LUNG: Clear to percussion bilaterally; No rales, rhonchi, wheezing, or rubs  HEART: Regular rate and rhythm; No murmurs, rubs, or gallops  ABDOMEN: Soft, Nontender, Nondistended; Bowel sounds present  EXTREMITIES:  2+ Peripheral Pulses, No clubbing, cyanosis, or edema  LYMPH: No lymphadenopathy noted  SKIN: No rashes or lesions    Care Discussed with Consultants/Other Providers [ x] YES  [ ] NO

## 2019-04-25 NOTE — PROGRESS NOTE ADULT - ATTENDING COMMENTS
Precordial chest pain  MI ruled out  stress  tests is  negative    HTN better  controlled    DM  stable  continue  management  as  prescribed    HLD continue  statins    Gout  in remission    discharge  home  on current  regiment  to be  follow  by PMD

## 2019-04-25 NOTE — CONSULT NOTE ADULT - ASSESSMENT
47 y/o F with a PMHx of DM, Gout, Htn, renal stones kidney stones and PSHx of  presents to ED c/o intermittent chest pain x 4 days and uncontrolled HTN.  1.Tele monitoring.  2.Echocardiogram.  3.Stress test-r/o ischemia.  4.DM-Insulin,check a1c.  5.HTN-b blocker for now.  6.Gout-allopurinol and colchicine.  7.Cont asa,statin.  8.GI and DVT prophylaxis.

## 2019-04-25 NOTE — CONSULT NOTE ADULT - SUBJECTIVE AND OBJECTIVE BOX
Time of visit:    CHIEF COMPLAINT: Patient is a 48y old  Female who presents with a chief complaint of chest pain (2019 17:10)      HPI:  Pt is a 49 y/o F with a PMHx of DM, Gout, Htn, renal stones kidney stones and PSHx of  presents to ED c/o intermittent chest pain x 4 days.Pt describes pain as dull, pressure like, 7/10 intensity, no radiation. Pt denies any previous history od similar pain, sob, palpitation, burning sensation, cough or leg swelling. Pt also has h/o diabetes currently on oral medications, was recently started on blood pressure medications by PCP that were later discontinued. Pt was also diagnosed with Gout 1 year ago and was started on alopurinol. Pt has one episiode of renal colic in march and was found to have UPJ stone 2 mm on left side with hydroureter. Pt recently had URTI and sinusitus and was started on antibiotics on  for 10 days. Pt denies any sinus pain or headache for now. (2019 20:42)   Patient seen and examined.     PAST MEDICAL & SURGICAL HISTORY:  Gout  Kidney stone  Diabetes   Section      Allergies    No Known Allergies    Intolerances        MEDICATIONS  (STANDING):  allopurinol 300 milliGRAM(s) Oral daily  aspirin  chewable 81 milliGRAM(s) Oral daily  atorvastatin 40 milliGRAM(s) Oral at bedtime  colchicine 0.6 milliGRAM(s) Oral two times a day  insulin lispro (HumaLOG) corrective regimen sliding scale   SubCutaneous three times a day before meals  metoprolol tartrate 25 milliGRAM(s) Oral two times a day  nitroglycerin    2% Ointment 0.5 Inch(s) Transdermal daily  tamsulosin 0.4 milliGRAM(s) Oral at bedtime      MEDICATIONS  (PRN):  glucagon  Injectable 1 milliGRAM(s) IntraMuscular once PRN Glucose LESS THAN 70 milligrams/deciliter   Medications up to date at time of exam.    Medications up to date at time of exam.    FAMILY HISTORY:  Family history of gout  F Dead  M alive      SOCIAL HISTORY  Smoking History: [  x ]  none smoking/smoke exposure, [   ] former smoker  Living Condition: [   ] apartment, [   ] private house  Work History: supervisor at [employer]  Travel History: denies recent travel  Illicit Substance Use: denies  Alcohol Use: denies    REVIEW OF SYSTEMS:    CONSTITUTIONAL:  denies fevers, chills, sweats, weight loss    HEENT:  denies diplopia or blurred vision, sore throat or runny nose.    CARDIOVASCULAR:  denies pressure, squeezing, tightness, or heaviness about the chest; no palpitations.    RESPIRATORY:  denies SOB, cough, TROTTER, wheezing.    GASTROINTESTINAL:  denies abdominal pain, nausea, vomiting or diarrhea.    GENITOURINARY: denies dysuria, frequency or urgency.    NEUROLOGIC:  denies numbness, tingling, seizures or weakness.    PSYCHIATRIC:  denies disorder of thought or mood.    MSK: denies swelling, redness      PHYSICAL EXAMINATION:    GENERAL: The patient is a well-developed, well-nourished, in no apparent distress.     Vital Signs Last 24 Hrs  T(C): 36.4 (2019 15:42), Max: 37 (2019 07:25)  T(F): 97.5 (2019 15:42), Max: 98.6 (2019 07:25)  HR: 71 (2019 15:42) (71 - 88)  BP: 116/69 (2019 15:42) (101/54 - 134/85)  BP(mean): --  RR: 18 (2019 15:42) (16 - 19)  SpO2: 98% (2019 15:42) (97% - 100%)   (if applicable)    Chest Tube (if applicable)    HEENT: Head is normocephalic and atraumatic. Extraocular muscles are intact. Mucous membranes are moist.     NECK: Supple, no palpable adenopathy.    LUNGS: Clear to auscultation, no wheezing, rales, or rhonchi.    HEART: Regular rate and rhythm without murmur.    ABDOMEN: Soft, nontender, and nondistended.  No hepatosplenomegaly is noted.    RENAL: No difficulty voiding, no pelvic pain    EXTREMITIES: Without any cyanosis, clubbing, rash, lesions or edema.    NEUROLOGIC: Awake, alert, oriented, grossly intact    SKIN: Warm, dry, good turgor.      LABS:                        10.8   10.15 )-----------( 321      ( 2019 08:36 )             34.4     04-25    140  |  106  |  25<H>  ----------------------------<  189<H>  4.2   |  27  |  1.03    Ca    8.6      2019 08:36    TPro  6.9  /  Alb  3.2<L>  /  TBili  0.6  /  DBili  x   /  AST  26  /  ALT  42  /  AlkPhos  86      PTT - ( 2019 08:36 )  PTT:32.9 sec      CARDIAC MARKERS ( 2019 09:36 )  <0.015 ng/mL / x     / 58 U/L / x     / <1.0 ng/mL  CARDIAC MARKERS ( 2019 01:47 )  <0.015 ng/mL / x     / 66 U/L / x     / <1.0 ng/mL  CARDIAC MARKERS ( 2019 18:17 )  <0.015 ng/mL / x     / 66 U/L / x     / <1.0 ng/mL                MICROBIOLOGY: (if applicable)    RADIOLOGY & ADDITIONAL STUDIES:  EKG:   CXR:   < from: Xray Chest 2 Views PA/Lat (19 @ 17:35) >    EXAM:  XR CHEST PA LAT 2V                            PROCEDURE DATE:  2019          INTERPRETATION:  INDICATION: chest pain    PRIORS: 2011    VIEWS: Frontal and lateral radiographs of the chest performed.    FINDINGS: Heart size appears within normal limits. No hilar or superior   mediastinal abnormalities are identified.    There is no evidence for focal infiltrate, lobar consolidation or   pulmonary edema. No pleural effusion or pneumothorax is demonstrated. No   mediastinal shift is noted. The visualized osseous structures appear   unremarkable.    IMPRESSION: No evidence for focal infiltrate or lobar consolidation.                OSMAN ARMENTA   This document has been electronically signed. 2019  5:39PM              CT:  Nuclear stress test:< from: Nuclear Stress Test-Pharmacologic (19 @ 09:37) >    PATIENT: KAT BEASLEY  : 1970   AGE: 48 (F)   MR#: [id number]  STUDY DATE: 2019  LOCATION: [address] [place], [place] [address]  REF. PHYSICIAN(S):  REBECCA MEJÍA MD  FELLOW:  ------------------------------------------------------------------------    TYPE OF TEST: Rest/Stress Pharmacologic  INDICATION: Chest pain, unspecified (R07.9)  HEIGHT: 158 cm  WEIGHT: 68.0 kg  ------------------------------------------------------------------------  HISTORY:  CARDIAC HISTORY: 48 years old female with  OTHERHISTORY: DM,Gout  RISK FACTORS: Diabetes, Post Menopausal, Elevated  Cholesterol, Hypertension  MEDICATIONS: Aspirin,Zyloprim,Lopressor,Flomax  ------------------------------------------------------------------------    BASELINE ELECTROCARDIOGRAM:  Rhythm: Normal Sinus Rhythm with nl axis    ------------------------------------------------------------------------    HEMODYNAMIC PARAMETERS:                                      HR      BP  Baseline  Pre-Injection             71  119/77  00:00     Inject Regadenoson         0  00:30     Post Injection           101  129/79  01:00     Post Injection           102  132/69  01:00     Recovery                 103  130/70  02:00     Recovery                  96  134/73  03:00     Recovery     99  128/77  04:00     Recovery                  95  123/79  ------------------------------------------------------------------------    Agent: Regadenoson 0.4 mg/5 ml NS. injected over 10 sec.  HR: Baseline HR: 71 bpm   Peak HR: 103 bpm (61% of MPHR)  MPHR: 170 bpm   85% of MPHR: 145 bpm  BP: Baseline BP: 119/77 mmHg   Peak BP: 132/69 mmHg   Peak  RPP: [id number] (Rate Pressure Product)  HR Isotope Injected: 71 bpm (Tc 99m Tetrofosmin)  101 bpm (Tc 99m Tetrofosmin)  Last Caffeine intake: 12 hrs  Terminated: Completion of protocol  ------------------------------------------------------------------------    SYMPTOMS/FINDINGS:  Symptoms: Flushing  Chest pain: No chest pain with administration of  Regadenoson  ------------------------------------------------------------------------    ECG ABNORMALITIES DURING/AFTER STRESS:   Abnormalities: None  ------------------------------------------------------------------------    NEW ARRHYTHMIAS DEVELOPED DURING/AFTER STRESS:  None  ------------------------------------------------------------------------    STRESS TEST IMPRESSIONS:  Negative ECG evidence of ischemia after IV of Lexiscan.  ------------------------------------------------------------------------    PROCEDURE:  10.2 mCi of Tc 99m Tetrofosmin were injected intravenously  at rest. Approximately 45 minutes later, tomographic  images were obtained in a 180 degree arc from right  anterior oblique to left anterior oblique with 64 stops.  At a separate time, 30.2 mCi of Tc 99m Tetrofosmin were  injected intravenously during stress protocol.  Approximately 45 minute(s) later, tomographic images were  obtained in a 180 degree arc from right anterior oblique  to left anterior oblique with 64 stops. The tomographic  slices were reconstructed in 3 orthogonal planes (short  axis, horizontal long axis and vertical long axis).  Interpretation was performed both by visual and  quantitative analysis.    ------------------------------------------------------------------------    NUCLEAR FINDINGS:  Review of raw data shows: The study is of good technical  quality.  The left ventricle was normal in size. Normal myocardial  perfusion scan, with no evidence of infarction or  inducible ischemia.  ------------------------------------------------------------------------      GATED ANALYSIS:  Post-stress resting myocardial perfusion gated SPECT  imaging was performed (LVEF > 70%)  ------------------------------------------------------------------------      LV/RV OBSERVATION:  Normal LV ejection fraction., No segmental wall motion  abnormality.  ------------------------------------------------------------------------    IMPRESSIONS:Normal Study  * Negative ECG evidence of ischemia after IV of Lexiscan.  * Review of raw data shows: The study is of good technical  quality.  * The left ventricle was normal in size. Normal myocardial  perfusion scan, with no evidence of infarction or  inducible ischemia.  * Post-stress resting myocardial perfusion gated SPECT  imaging was performed (LVEF > 70%)    ------------------------------------------------------------------------      ------------------------------------------------------------------------    Confirmed on  2019 - 12:49:50 at [place] by  Preethi Wooten MD  ------------------------------------------------------------------------    < end of copied text >      IMPRESSION: 48y Female PAST MEDICAL & SURGICAL HISTORY:  Gout  Kidney stone  Diabetes   Section   p/w     IMP: This is a 48 yr old indo-[place] woman with DM, Obesity, gout , recent pna, kidney stones, HLD  presented with intermittent chest pain associated with SOB  and admitted for ACS and uncontrol hypertension.  BP is better and nuclear stress neg. Since pat is currently asymptomatic and O2 is ok without any pulmonary symptoms , no further work up for PE.        RECOMMENDATIONS:  -continue lopressor  -blood sugar control  -wt loss  -if pat become symptomatic from pulmonary point of view , please f/u as out pat.    c/d house staff

## 2019-04-25 NOTE — DISCHARGE NOTE PROVIDER - NSDCCPCAREPLAN_GEN_ALL_CORE_FT
PRINCIPAL DISCHARGE DIAGNOSIS  Diagnosis: Chest pain  Assessment and Plan of Treatment: You presented with chest pain. EKG showed normal sinus rhythm. Serial cardiac enzymes were normal. Cardiology was consulted. Stress test was done which was normal. Chest pain likely due to gastriti. You should follow up with primary care provider.      SECONDARY DISCHARGE DIAGNOSES  Diagnosis: Diabetes  Assessment and Plan of Treatment: You should continue your medication as above. You should consume low carb diet. You should monitor your blood glucose and follow up with primary care provider.    Diagnosis: Kidney stone  Assessment and Plan of Treatment: CT abdomen pelvis showed  left kidney stone with mild hydronephrosis.  You should continue with tamsulosin and follow up with urology.    Diagnosis: Gout  Assessment and Plan of Treatment: You should continue your medication as above and follow up with primary care provider. PRINCIPAL DISCHARGE DIAGNOSIS  Diagnosis: Chest pain  Assessment and Plan of Treatment: You presented with chest pain. EKG showed normal sinus rhythm. Serial cardiac enzymes were normal. Cardiology was consulted. Stress test was done which was normal. Chest pain likely due to gastriti. You should follow up with primary care provider.      SECONDARY DISCHARGE DIAGNOSES  Diagnosis: Diabetes  Assessment and Plan of Treatment: You should continue your medication as above. You should consume low carb diet. You should monitor your blood glucose and follow up with primary care provider.    Diagnosis: Hypertension  Assessment and Plan of Treatment: Your blood pressure was found to be elevated at ED. You shouuld continue your medication as above. You should consume low salt diet like fruits and vegetables. You should monitor your blood pressure and follow up with primary care provider.    Diagnosis: Kidney stone  Assessment and Plan of Treatment: CT abdomen pelvis showed  left kidney stone with mild hydronephrosis.  You should continue with tamsulosin and follow up with urology.    Diagnosis: Gout  Assessment and Plan of Treatment: You should continue your medication as above and follow up with primary care provider.

## 2019-07-18 ENCOUNTER — APPOINTMENT (OUTPATIENT)
Dept: OBGYN | Facility: CLINIC | Age: 49
End: 2019-07-18

## 2019-07-24 ENCOUNTER — EMERGENCY (EMERGENCY)
Facility: HOSPITAL | Age: 49
LOS: 1 days | Discharge: HOME | End: 2019-07-24
Attending: EMERGENCY MEDICINE
Payer: COMMERCIAL

## 2019-07-24 VITALS
RESPIRATION RATE: 16 BRPM | TEMPERATURE: 98 F | HEART RATE: 88 BPM | SYSTOLIC BLOOD PRESSURE: 145 MMHG | WEIGHT: 151.9 LBS | HEIGHT: 64 IN | OXYGEN SATURATION: 99 % | DIASTOLIC BLOOD PRESSURE: 86 MMHG

## 2019-07-24 VITALS
OXYGEN SATURATION: 99 % | TEMPERATURE: 98 F | SYSTOLIC BLOOD PRESSURE: 129 MMHG | HEART RATE: 76 BPM | RESPIRATION RATE: 18 BRPM | DIASTOLIC BLOOD PRESSURE: 80 MMHG

## 2019-07-24 PROBLEM — M10.9 GOUT, UNSPECIFIED: Chronic | Status: ACTIVE | Noted: 2019-04-25

## 2019-07-24 PROBLEM — N20.0 CALCULUS OF KIDNEY: Chronic | Status: ACTIVE | Noted: 2019-04-24

## 2019-07-24 LAB
ALBUMIN SERPL ELPH-MCNC: 3.2 G/DL — LOW (ref 3.5–5)
ALP SERPL-CCNC: 117 U/L — SIGNIFICANT CHANGE UP (ref 40–120)
ALT FLD-CCNC: 23 U/L DA — SIGNIFICANT CHANGE UP (ref 10–60)
ANION GAP SERPL CALC-SCNC: 6 MMOL/L — SIGNIFICANT CHANGE UP (ref 5–17)
APPEARANCE UR: ABNORMAL
AST SERPL-CCNC: 22 U/L — SIGNIFICANT CHANGE UP (ref 10–40)
BACTERIA # UR AUTO: ABNORMAL /HPF
BASOPHILS # BLD AUTO: 0.08 K/UL — SIGNIFICANT CHANGE UP (ref 0–0.2)
BASOPHILS NFR BLD AUTO: 0.8 % — SIGNIFICANT CHANGE UP (ref 0–2)
BILIRUB SERPL-MCNC: 0.5 MG/DL — SIGNIFICANT CHANGE UP (ref 0.2–1.2)
BILIRUB UR-MCNC: NEGATIVE — SIGNIFICANT CHANGE UP
BUN SERPL-MCNC: 18 MG/DL — SIGNIFICANT CHANGE UP (ref 7–18)
CALCIUM SERPL-MCNC: 9.2 MG/DL — SIGNIFICANT CHANGE UP (ref 8.4–10.5)
CHLORIDE SERPL-SCNC: 101 MMOL/L — SIGNIFICANT CHANGE UP (ref 96–108)
CO2 SERPL-SCNC: 29 MMOL/L — SIGNIFICANT CHANGE UP (ref 22–31)
COLOR SPEC: YELLOW — SIGNIFICANT CHANGE UP
CREAT SERPL-MCNC: 0.97 MG/DL — SIGNIFICANT CHANGE UP (ref 0.5–1.3)
DIFF PNL FLD: ABNORMAL
EOSINOPHIL # BLD AUTO: 0.18 K/UL — SIGNIFICANT CHANGE UP (ref 0–0.5)
EOSINOPHIL NFR BLD AUTO: 1.8 % — SIGNIFICANT CHANGE UP (ref 0–6)
EPI CELLS # UR: ABNORMAL /HPF
GLUCOSE SERPL-MCNC: 209 MG/DL — HIGH (ref 70–99)
GLUCOSE UR QL: NEGATIVE — SIGNIFICANT CHANGE UP
HCG UR QL: NEGATIVE — SIGNIFICANT CHANGE UP
HCT VFR BLD CALC: 37.9 % — SIGNIFICANT CHANGE UP (ref 34.5–45)
HGB BLD-MCNC: 12.4 G/DL — SIGNIFICANT CHANGE UP (ref 11.5–15.5)
IMM GRANULOCYTES NFR BLD AUTO: 0.5 % — SIGNIFICANT CHANGE UP (ref 0–1.5)
KETONES UR-MCNC: NEGATIVE — SIGNIFICANT CHANGE UP
LEUKOCYTE ESTERASE UR-ACNC: ABNORMAL
LIDOCAIN IGE QN: 153 U/L — SIGNIFICANT CHANGE UP (ref 73–393)
LYMPHOCYTES # BLD AUTO: 2.76 K/UL — SIGNIFICANT CHANGE UP (ref 1–3.3)
LYMPHOCYTES # BLD AUTO: 27.7 % — SIGNIFICANT CHANGE UP (ref 13–44)
MCHC RBC-ENTMCNC: 27.9 PG — SIGNIFICANT CHANGE UP (ref 27–34)
MCHC RBC-ENTMCNC: 32.7 GM/DL — SIGNIFICANT CHANGE UP (ref 32–36)
MCV RBC AUTO: 85.4 FL — SIGNIFICANT CHANGE UP (ref 80–100)
MONOCYTES # BLD AUTO: 0.41 K/UL — SIGNIFICANT CHANGE UP (ref 0–0.9)
MONOCYTES NFR BLD AUTO: 4.1 % — SIGNIFICANT CHANGE UP (ref 2–14)
NEUTROPHILS # BLD AUTO: 6.47 K/UL — SIGNIFICANT CHANGE UP (ref 1.8–7.4)
NEUTROPHILS NFR BLD AUTO: 65.1 % — SIGNIFICANT CHANGE UP (ref 43–77)
NITRITE UR-MCNC: NEGATIVE — SIGNIFICANT CHANGE UP
NRBC # BLD: 0 /100 WBCS — SIGNIFICANT CHANGE UP (ref 0–0)
PH UR: 5 — SIGNIFICANT CHANGE UP (ref 5–8)
PLATELET # BLD AUTO: 341 K/UL — SIGNIFICANT CHANGE UP (ref 150–400)
POTASSIUM SERPL-MCNC: 4 MMOL/L — SIGNIFICANT CHANGE UP (ref 3.5–5.3)
POTASSIUM SERPL-SCNC: 4 MMOL/L — SIGNIFICANT CHANGE UP (ref 3.5–5.3)
PROT SERPL-MCNC: 8.2 G/DL — SIGNIFICANT CHANGE UP (ref 6–8.3)
PROT UR-MCNC: 30 MG/DL
RBC # BLD: 4.44 M/UL — SIGNIFICANT CHANGE UP (ref 3.8–5.2)
RBC # FLD: 12.6 % — SIGNIFICANT CHANGE UP (ref 10.3–14.5)
RBC CASTS # UR COMP ASSIST: >50 /HPF (ref 0–2)
SODIUM SERPL-SCNC: 136 MMOL/L — SIGNIFICANT CHANGE UP (ref 135–145)
SP GR SPEC: 1.01 — SIGNIFICANT CHANGE UP (ref 1.01–1.02)
URATE CRY FLD QL MICRO: ABNORMAL /HPF
UROBILINOGEN FLD QL: NEGATIVE — SIGNIFICANT CHANGE UP
WBC # BLD: 9.95 K/UL — SIGNIFICANT CHANGE UP (ref 3.8–10.5)
WBC # FLD AUTO: 9.95 K/UL — SIGNIFICANT CHANGE UP (ref 3.8–10.5)
WBC UR QL: ABNORMAL /HPF (ref 0–5)

## 2019-07-24 PROCEDURE — 74176 CT ABD & PELVIS W/O CONTRAST: CPT

## 2019-07-24 PROCEDURE — 96375 TX/PRO/DX INJ NEW DRUG ADDON: CPT

## 2019-07-24 PROCEDURE — 85027 COMPLETE CBC AUTOMATED: CPT

## 2019-07-24 PROCEDURE — 96374 THER/PROPH/DIAG INJ IV PUSH: CPT

## 2019-07-24 PROCEDURE — 81025 URINE PREGNANCY TEST: CPT

## 2019-07-24 PROCEDURE — 80053 COMPREHEN METABOLIC PANEL: CPT

## 2019-07-24 PROCEDURE — 36415 COLL VENOUS BLD VENIPUNCTURE: CPT

## 2019-07-24 PROCEDURE — 74176 CT ABD & PELVIS W/O CONTRAST: CPT | Mod: 26

## 2019-07-24 PROCEDURE — 99284 EMERGENCY DEPT VISIT MOD MDM: CPT | Mod: 25

## 2019-07-24 PROCEDURE — 83690 ASSAY OF LIPASE: CPT

## 2019-07-24 PROCEDURE — 99285 EMERGENCY DEPT VISIT HI MDM: CPT

## 2019-07-24 PROCEDURE — 81001 URINALYSIS AUTO W/SCOPE: CPT

## 2019-07-24 RX ORDER — CEFUROXIME AXETIL 250 MG
1 TABLET ORAL
Qty: 20 | Refills: 0
Start: 2019-07-24 | End: 2019-08-02

## 2019-07-24 RX ORDER — KETOROLAC TROMETHAMINE 30 MG/ML
30 SYRINGE (ML) INJECTION ONCE
Refills: 0 | Status: DISCONTINUED | OUTPATIENT
Start: 2019-07-24 | End: 2019-07-24

## 2019-07-24 RX ORDER — IBUPROFEN 200 MG
1 TABLET ORAL
Qty: 20 | Refills: 0
Start: 2019-07-24 | End: 2019-07-28

## 2019-07-24 RX ORDER — FAMOTIDINE 10 MG/ML
20 INJECTION INTRAVENOUS ONCE
Refills: 0 | Status: COMPLETED | OUTPATIENT
Start: 2019-07-24 | End: 2019-07-24

## 2019-07-24 RX ORDER — OXYCODONE AND ACETAMINOPHEN 5; 325 MG/1; MG/1
1 TABLET ORAL ONCE
Refills: 0 | Status: DISCONTINUED | OUTPATIENT
Start: 2019-07-24 | End: 2019-07-24

## 2019-07-24 RX ORDER — SODIUM CHLORIDE 9 MG/ML
1000 INJECTION INTRAMUSCULAR; INTRAVENOUS; SUBCUTANEOUS ONCE
Refills: 0 | Status: COMPLETED | OUTPATIENT
Start: 2019-07-24 | End: 2019-07-24

## 2019-07-24 RX ADMIN — FAMOTIDINE 20 MILLIGRAM(S): 10 INJECTION INTRAVENOUS at 17:04

## 2019-07-24 RX ADMIN — SODIUM CHLORIDE 1000 MILLILITER(S): 9 INJECTION INTRAMUSCULAR; INTRAVENOUS; SUBCUTANEOUS at 17:03

## 2019-07-24 RX ADMIN — Medication 30 MILLIGRAM(S): at 17:02

## 2019-07-24 RX ADMIN — OXYCODONE AND ACETAMINOPHEN 1 TABLET(S): 5; 325 TABLET ORAL at 18:35

## 2019-07-24 NOTE — ED PROVIDER NOTE - CLINICAL SUMMARY MEDICAL DECISION MAKING FREE TEXT BOX
Will do labs, CT scan, pain meds, and reassess. Right flank pain with urgency. Will do labs, CT scan, pain meds, and reassess.

## 2019-07-24 NOTE — ED PROVIDER NOTE - OBJECTIVE STATEMENT
Patient is a 49 y.o. female with a significant PMHx of diabetes, kidney stones, and gout presents to the ED with right flank pain. Patient reports last had a kidney stone in April 2019 that passed on its own. Positive urinary urgency, denies burning urination. Denies nausea, vomiting, or diarrhea. No chest pain or shortness of breath. NKDA.

## 2019-07-24 NOTE — ED ADULT NURSE NOTE - NSIMPLEMENTINTERV_GEN_ALL_ED
Implemented All Universal Safety Interventions:  [place] to call system. Call bell, personal items and telephone within reach. Instruct patient to call for assistance. Room bathroom lighting operational. Non-slip footwear when patient is off stretcher. Physically safe environment: no spills, clutter or unnecessary equipment. Stretcher in lowest position, wheels locked, appropriate side rails in place.

## 2019-12-17 NOTE — ED PROVIDER NOTE - ENMT, MLM
Clear
Airway patent, Nasal mucosa clear. Mouth with normal mucosa. Throat has no vesicles, no oropharyngeal exudates and uvula is midline.

## 2020-06-25 ENCOUNTER — EMERGENCY (EMERGENCY)
Facility: HOSPITAL | Age: 50
LOS: 1 days | Discharge: HOME | End: 2020-06-25
Attending: EMERGENCY MEDICINE
Payer: COMMERCIAL

## 2020-06-25 VITALS
OXYGEN SATURATION: 100 % | WEIGHT: 151.9 LBS | HEART RATE: 90 BPM | RESPIRATION RATE: 17 BRPM | DIASTOLIC BLOOD PRESSURE: 77 MMHG | HEIGHT: 62 IN | TEMPERATURE: 99 F | SYSTOLIC BLOOD PRESSURE: 145 MMHG

## 2020-06-25 LAB
ALBUMIN SERPL ELPH-MCNC: 3 G/DL — LOW (ref 3.5–5)
ALP SERPL-CCNC: 90 U/L — SIGNIFICANT CHANGE UP (ref 40–120)
ALT FLD-CCNC: 19 U/L DA — SIGNIFICANT CHANGE UP (ref 10–60)
ANION GAP SERPL CALC-SCNC: 9 MMOL/L — SIGNIFICANT CHANGE UP (ref 5–17)
APPEARANCE UR: CLEAR — SIGNIFICANT CHANGE UP
AST SERPL-CCNC: 15 U/L — SIGNIFICANT CHANGE UP (ref 10–40)
BACTERIA # UR AUTO: ABNORMAL /HPF
BASOPHILS # BLD AUTO: 0.08 K/UL — SIGNIFICANT CHANGE UP (ref 0–0.2)
BASOPHILS NFR BLD AUTO: 0.6 % — SIGNIFICANT CHANGE UP (ref 0–2)
BILIRUB SERPL-MCNC: 0.5 MG/DL — SIGNIFICANT CHANGE UP (ref 0.2–1.2)
BILIRUB UR-MCNC: NEGATIVE — SIGNIFICANT CHANGE UP
BUN SERPL-MCNC: 8 MG/DL — SIGNIFICANT CHANGE UP (ref 7–18)
CALCIUM SERPL-MCNC: 8.4 MG/DL — SIGNIFICANT CHANGE UP (ref 8.4–10.5)
CHLORIDE SERPL-SCNC: 105 MMOL/L — SIGNIFICANT CHANGE UP (ref 96–108)
CO2 SERPL-SCNC: 25 MMOL/L — SIGNIFICANT CHANGE UP (ref 22–31)
COLOR SPEC: YELLOW — SIGNIFICANT CHANGE UP
CREAT SERPL-MCNC: 0.85 MG/DL — SIGNIFICANT CHANGE UP (ref 0.5–1.3)
DIFF PNL FLD: NEGATIVE — SIGNIFICANT CHANGE UP
EOSINOPHIL # BLD AUTO: 0.2 K/UL — SIGNIFICANT CHANGE UP (ref 0–0.5)
EOSINOPHIL NFR BLD AUTO: 1.6 % — SIGNIFICANT CHANGE UP (ref 0–6)
EPI CELLS # UR: ABNORMAL /HPF
GLUCOSE SERPL-MCNC: 188 MG/DL — HIGH (ref 70–99)
GLUCOSE UR QL: 100 MG/DL
HCT VFR BLD CALC: 36.9 % — SIGNIFICANT CHANGE UP (ref 34.5–45)
HGB BLD-MCNC: 11.7 G/DL — SIGNIFICANT CHANGE UP (ref 11.5–15.5)
IMM GRANULOCYTES NFR BLD AUTO: 0.3 % — SIGNIFICANT CHANGE UP (ref 0–1.5)
KETONES UR-MCNC: NEGATIVE — SIGNIFICANT CHANGE UP
LEUKOCYTE ESTERASE UR-ACNC: NEGATIVE — SIGNIFICANT CHANGE UP
LYMPHOCYTES # BLD AUTO: 3.94 K/UL — HIGH (ref 1–3.3)
LYMPHOCYTES # BLD AUTO: 31 % — SIGNIFICANT CHANGE UP (ref 13–44)
MCHC RBC-ENTMCNC: 27.6 PG — SIGNIFICANT CHANGE UP (ref 27–34)
MCHC RBC-ENTMCNC: 31.7 GM/DL — LOW (ref 32–36)
MCV RBC AUTO: 87 FL — SIGNIFICANT CHANGE UP (ref 80–100)
MONOCYTES # BLD AUTO: 0.6 K/UL — SIGNIFICANT CHANGE UP (ref 0–0.9)
MONOCYTES NFR BLD AUTO: 4.7 % — SIGNIFICANT CHANGE UP (ref 2–14)
NEUTROPHILS # BLD AUTO: 7.84 K/UL — HIGH (ref 1.8–7.4)
NEUTROPHILS NFR BLD AUTO: 61.8 % — SIGNIFICANT CHANGE UP (ref 43–77)
NITRITE UR-MCNC: NEGATIVE — SIGNIFICANT CHANGE UP
NRBC # BLD: 0 /100 WBCS — SIGNIFICANT CHANGE UP (ref 0–0)
PH UR: 5 — SIGNIFICANT CHANGE UP (ref 5–8)
PLATELET # BLD AUTO: 291 K/UL — SIGNIFICANT CHANGE UP (ref 150–400)
POTASSIUM SERPL-MCNC: 3.5 MMOL/L — SIGNIFICANT CHANGE UP (ref 3.5–5.3)
POTASSIUM SERPL-SCNC: 3.5 MMOL/L — SIGNIFICANT CHANGE UP (ref 3.5–5.3)
PROT SERPL-MCNC: 7.2 G/DL — SIGNIFICANT CHANGE UP (ref 6–8.3)
PROT UR-MCNC: 15
RBC # BLD: 4.24 M/UL — SIGNIFICANT CHANGE UP (ref 3.8–5.2)
RBC # FLD: 13.2 % — SIGNIFICANT CHANGE UP (ref 10.3–14.5)
RBC CASTS # UR COMP ASSIST: SIGNIFICANT CHANGE UP /HPF (ref 0–2)
SODIUM SERPL-SCNC: 139 MMOL/L — SIGNIFICANT CHANGE UP (ref 135–145)
SP GR SPEC: 1.01 — SIGNIFICANT CHANGE UP (ref 1.01–1.02)
URATE CRY FLD QL MICRO: ABNORMAL /HPF
UROBILINOGEN FLD QL: NEGATIVE — SIGNIFICANT CHANGE UP
WBC # BLD: 12.7 K/UL — HIGH (ref 3.8–10.5)
WBC # FLD AUTO: 12.7 K/UL — HIGH (ref 3.8–10.5)
WBC UR QL: SIGNIFICANT CHANGE UP /HPF (ref 0–5)

## 2020-06-25 PROCEDURE — 85027 COMPLETE CBC AUTOMATED: CPT

## 2020-06-25 PROCEDURE — 99284 EMERGENCY DEPT VISIT MOD MDM: CPT

## 2020-06-25 PROCEDURE — 76775 US EXAM ABDO BACK WALL LIM: CPT | Mod: 26

## 2020-06-25 PROCEDURE — 82962 GLUCOSE BLOOD TEST: CPT

## 2020-06-25 PROCEDURE — 76775 US EXAM ABDO BACK WALL LIM: CPT

## 2020-06-25 PROCEDURE — 36415 COLL VENOUS BLD VENIPUNCTURE: CPT

## 2020-06-25 PROCEDURE — 99284 EMERGENCY DEPT VISIT MOD MDM: CPT | Mod: 25

## 2020-06-25 PROCEDURE — 80053 COMPREHEN METABOLIC PANEL: CPT

## 2020-06-25 PROCEDURE — 93970 EXTREMITY STUDY: CPT

## 2020-06-25 PROCEDURE — 93970 EXTREMITY STUDY: CPT | Mod: 26

## 2020-06-25 PROCEDURE — 81001 URINALYSIS AUTO W/SCOPE: CPT

## 2020-06-25 NOTE — ED ADULT TRIAGE NOTE - CHIEF COMPLAINT QUOTE
patient reports bilateral lower leg swelling. denies any shortness of breath or chest pains. patient states she stopped taking all her DM medications.

## 2020-06-25 NOTE — ED ADULT NURSE NOTE - OBJECTIVE STATEMENT
AOX4 +ambulatory patient reports patient reports bilateral lower leg swelling x a couple days. Patient noted with +2 pitting edema. Patient states she stopped taking all her medication for DM. No sob no cp

## 2020-06-25 NOTE — ED ADULT NURSE NOTE - NSIMPLEMENTINTERV_GEN_ALL_ED
Implemented All Universal Safety Interventions:  [place] to call system. Call bell, personal items and telephone within reach. Instruct patient to call for assistance. Room bathroom lighting operational. Non-slip footwear when patient is off stretcher. Physically safe environment: no spills, clutter or unnecessary equipment. Stretcher in lowest position, wheels locked, appropriate side rails in place.

## 2020-06-25 NOTE — ED PROVIDER NOTE - OBJECTIVE STATEMENT
sylvester;ateral lower extremities swelling/numbness of lower extremities 3-4 dayd.hx of niddm,stopped her medications.

## 2020-06-25 NOTE — ED PROVIDER NOTE - PATIENT PORTAL LINK FT
You can access the FollowMyHealth Patient Portal offered by [facility] by registering at the following website: [email or web address]. By joining [facility] FollowMyHealth portal, you will also be able to view your health information using other applications (apps) compatible with our system.

## 2020-09-21 ENCOUNTER — EMERGENCY (EMERGENCY)
Facility: HOSPITAL | Age: 50
LOS: 1 days | Discharge: HOME | End: 2020-09-21
Attending: EMERGENCY MEDICINE
Payer: COMMERCIAL

## 2020-09-21 VITALS
SYSTOLIC BLOOD PRESSURE: 159 MMHG | RESPIRATION RATE: 18 BRPM | OXYGEN SATURATION: 99 % | HEART RATE: 86 BPM | HEIGHT: 64.96 IN | DIASTOLIC BLOOD PRESSURE: 88 MMHG | TEMPERATURE: 98 F | WEIGHT: 152.12 LBS

## 2020-09-21 VITALS
HEART RATE: 86 BPM | SYSTOLIC BLOOD PRESSURE: 158 MMHG | DIASTOLIC BLOOD PRESSURE: 94 MMHG | OXYGEN SATURATION: 99 % | TEMPERATURE: 98 F | RESPIRATION RATE: 18 BRPM

## 2020-09-21 LAB
ALBUMIN SERPL ELPH-MCNC: 3.4 G/DL — LOW (ref 3.5–5)
ALP SERPL-CCNC: 113 U/L — SIGNIFICANT CHANGE UP (ref 40–120)
ALT FLD-CCNC: 31 U/L DA — SIGNIFICANT CHANGE UP (ref 10–60)
ANION GAP SERPL CALC-SCNC: 8 MMOL/L — SIGNIFICANT CHANGE UP (ref 5–17)
APPEARANCE UR: ABNORMAL
AST SERPL-CCNC: 27 U/L — SIGNIFICANT CHANGE UP (ref 10–40)
BASOPHILS # BLD AUTO: 0.09 K/UL — SIGNIFICANT CHANGE UP (ref 0–0.2)
BASOPHILS NFR BLD AUTO: 0.7 % — SIGNIFICANT CHANGE UP (ref 0–2)
BILIRUB SERPL-MCNC: 0.7 MG/DL — SIGNIFICANT CHANGE UP (ref 0.2–1.2)
BILIRUB UR-MCNC: NEGATIVE — SIGNIFICANT CHANGE UP
BUN SERPL-MCNC: 16 MG/DL — SIGNIFICANT CHANGE UP (ref 7–18)
CALCIUM SERPL-MCNC: 9.2 MG/DL — SIGNIFICANT CHANGE UP (ref 8.4–10.5)
CHLORIDE SERPL-SCNC: 105 MMOL/L — SIGNIFICANT CHANGE UP (ref 96–108)
CO2 SERPL-SCNC: 22 MMOL/L — SIGNIFICANT CHANGE UP (ref 22–31)
COLOR SPEC: YELLOW — SIGNIFICANT CHANGE UP
CREAT SERPL-MCNC: 1.18 MG/DL — SIGNIFICANT CHANGE UP (ref 0.5–1.3)
DIFF PNL FLD: ABNORMAL
EOSINOPHIL # BLD AUTO: 0.13 K/UL — SIGNIFICANT CHANGE UP (ref 0–0.5)
EOSINOPHIL NFR BLD AUTO: 1 % — SIGNIFICANT CHANGE UP (ref 0–6)
GLUCOSE SERPL-MCNC: 308 MG/DL — HIGH (ref 70–99)
GLUCOSE UR QL: 1000 MG/DL
HCG UR QL: NEGATIVE — SIGNIFICANT CHANGE UP
HCT VFR BLD CALC: 40 % — SIGNIFICANT CHANGE UP (ref 34.5–45)
HGB BLD-MCNC: 13 G/DL — SIGNIFICANT CHANGE UP (ref 11.5–15.5)
IMM GRANULOCYTES NFR BLD AUTO: 0.6 % — SIGNIFICANT CHANGE UP (ref 0–1.5)
KETONES UR-MCNC: NEGATIVE — SIGNIFICANT CHANGE UP
LEUKOCYTE ESTERASE UR-ACNC: NEGATIVE — SIGNIFICANT CHANGE UP
LIDOCAIN IGE QN: 122 U/L — SIGNIFICANT CHANGE UP (ref 73–393)
LYMPHOCYTES # BLD AUTO: 2.67 K/UL — SIGNIFICANT CHANGE UP (ref 1–3.3)
LYMPHOCYTES # BLD AUTO: 20.1 % — SIGNIFICANT CHANGE UP (ref 13–44)
MCHC RBC-ENTMCNC: 27.7 PG — SIGNIFICANT CHANGE UP (ref 27–34)
MCHC RBC-ENTMCNC: 32.5 GM/DL — SIGNIFICANT CHANGE UP (ref 32–36)
MCV RBC AUTO: 85.3 FL — SIGNIFICANT CHANGE UP (ref 80–100)
MONOCYTES # BLD AUTO: 0.36 K/UL — SIGNIFICANT CHANGE UP (ref 0–0.9)
MONOCYTES NFR BLD AUTO: 2.7 % — SIGNIFICANT CHANGE UP (ref 2–14)
NEUTROPHILS # BLD AUTO: 9.93 K/UL — HIGH (ref 1.8–7.4)
NEUTROPHILS NFR BLD AUTO: 74.9 % — SIGNIFICANT CHANGE UP (ref 43–77)
NITRITE UR-MCNC: NEGATIVE — SIGNIFICANT CHANGE UP
NRBC # BLD: 0 /100 WBCS — SIGNIFICANT CHANGE UP (ref 0–0)
PH UR: 5 — SIGNIFICANT CHANGE UP (ref 5–8)
PLATELET # BLD AUTO: 364 K/UL — SIGNIFICANT CHANGE UP (ref 150–400)
POTASSIUM SERPL-MCNC: 4.1 MMOL/L — SIGNIFICANT CHANGE UP (ref 3.5–5.3)
POTASSIUM SERPL-SCNC: 4.1 MMOL/L — SIGNIFICANT CHANGE UP (ref 3.5–5.3)
PROT SERPL-MCNC: 8.4 G/DL — HIGH (ref 6–8.3)
PROT UR-MCNC: 30 MG/DL
RBC # BLD: 4.69 M/UL — SIGNIFICANT CHANGE UP (ref 3.8–5.2)
RBC # FLD: 12.3 % — SIGNIFICANT CHANGE UP (ref 10.3–14.5)
SODIUM SERPL-SCNC: 135 MMOL/L — SIGNIFICANT CHANGE UP (ref 135–145)
SP GR SPEC: 1.02 — SIGNIFICANT CHANGE UP (ref 1.01–1.02)
UROBILINOGEN FLD QL: NEGATIVE — SIGNIFICANT CHANGE UP
WBC # BLD: 13.26 K/UL — HIGH (ref 3.8–10.5)
WBC # FLD AUTO: 13.26 K/UL — HIGH (ref 3.8–10.5)

## 2020-09-21 PROCEDURE — 87086 URINE CULTURE/COLONY COUNT: CPT

## 2020-09-21 PROCEDURE — 99284 EMERGENCY DEPT VISIT MOD MDM: CPT | Mod: 25

## 2020-09-21 PROCEDURE — 81001 URINALYSIS AUTO W/SCOPE: CPT

## 2020-09-21 PROCEDURE — 74176 CT ABD & PELVIS W/O CONTRAST: CPT | Mod: 26

## 2020-09-21 PROCEDURE — 36415 COLL VENOUS BLD VENIPUNCTURE: CPT

## 2020-09-21 PROCEDURE — 99285 EMERGENCY DEPT VISIT HI MDM: CPT

## 2020-09-21 PROCEDURE — 74176 CT ABD & PELVIS W/O CONTRAST: CPT

## 2020-09-21 PROCEDURE — 96374 THER/PROPH/DIAG INJ IV PUSH: CPT

## 2020-09-21 PROCEDURE — 96375 TX/PRO/DX INJ NEW DRUG ADDON: CPT

## 2020-09-21 PROCEDURE — 83690 ASSAY OF LIPASE: CPT

## 2020-09-21 PROCEDURE — 81025 URINE PREGNANCY TEST: CPT

## 2020-09-21 PROCEDURE — 80053 COMPREHEN METABOLIC PANEL: CPT

## 2020-09-21 PROCEDURE — 96376 TX/PRO/DX INJ SAME DRUG ADON: CPT

## 2020-09-21 PROCEDURE — 85025 COMPLETE CBC W/AUTO DIFF WBC: CPT

## 2020-09-21 RX ORDER — KETOROLAC TROMETHAMINE 30 MG/ML
30 SYRINGE (ML) INJECTION ONCE
Refills: 0 | Status: DISCONTINUED | OUTPATIENT
Start: 2020-09-21 | End: 2020-09-21

## 2020-09-21 RX ORDER — ONDANSETRON 8 MG/1
4 TABLET, FILM COATED ORAL ONCE
Refills: 0 | Status: COMPLETED | OUTPATIENT
Start: 2020-09-21 | End: 2020-09-21

## 2020-09-21 RX ORDER — MORPHINE SULFATE 50 MG/1
4 CAPSULE, EXTENDED RELEASE ORAL ONCE
Refills: 0 | Status: DISCONTINUED | OUTPATIENT
Start: 2020-09-21 | End: 2020-09-21

## 2020-09-21 RX ORDER — TAMSULOSIN HYDROCHLORIDE 0.4 MG/1
0.4 CAPSULE ORAL ONCE
Refills: 0 | Status: COMPLETED | OUTPATIENT
Start: 2020-09-21 | End: 2020-09-21

## 2020-09-21 RX ORDER — SODIUM CHLORIDE 9 MG/ML
1000 INJECTION INTRAMUSCULAR; INTRAVENOUS; SUBCUTANEOUS ONCE
Refills: 0 | Status: COMPLETED | OUTPATIENT
Start: 2020-09-21 | End: 2020-09-21

## 2020-09-21 RX ORDER — IBUPROFEN 200 MG
1 TABLET ORAL
Qty: 40 | Refills: 0
Start: 2020-09-21 | End: 2020-09-30

## 2020-09-21 RX ORDER — TAMSULOSIN HYDROCHLORIDE 0.4 MG/1
1 CAPSULE ORAL
Qty: 10 | Refills: 0
Start: 2020-09-21 | End: 2020-09-30

## 2020-09-21 RX ADMIN — TAMSULOSIN HYDROCHLORIDE 0.4 MILLIGRAM(S): 0.4 CAPSULE ORAL at 07:48

## 2020-09-21 RX ADMIN — ONDANSETRON 4 MILLIGRAM(S): 8 TABLET, FILM COATED ORAL at 07:48

## 2020-09-21 RX ADMIN — MORPHINE SULFATE 4 MILLIGRAM(S): 50 CAPSULE, EXTENDED RELEASE ORAL at 07:35

## 2020-09-21 RX ADMIN — SODIUM CHLORIDE 1000 MILLILITER(S): 9 INJECTION INTRAMUSCULAR; INTRAVENOUS; SUBCUTANEOUS at 06:30

## 2020-09-21 RX ADMIN — Medication 30 MILLIGRAM(S): at 07:35

## 2020-09-21 RX ADMIN — MORPHINE SULFATE 4 MILLIGRAM(S): 50 CAPSULE, EXTENDED RELEASE ORAL at 06:27

## 2020-09-21 RX ADMIN — MORPHINE SULFATE 4 MILLIGRAM(S): 50 CAPSULE, EXTENDED RELEASE ORAL at 06:52

## 2020-09-21 NOTE — ED ADULT NURSE NOTE - NSFALLRSKPASTHIST_ED_ALL_ED
CHIEF COMPLAINT: f/u evaluation for uncontrolled type 2 diabetes    HISTORY OF PRESENT ILLNESS:   Kameron Finn is a 68 y.o. male with a PMHx as noted below who presents to the endocrinology clinic for f/u evaluation of uncontrolled type 2 diabetes. Started dialysis in 2018. Patients A1c previously 6.6% and today is 7.5%. He had been reportedly taking glipizide per his nephrology team / pharmacy, and I had verified with them that I had not prescribed this and it has not been included in my notes or instructions to him since I had been treating him in the past years. He is not taking the 70/30 insulin regularly at this time.      Currently taking the following meds:  Victoza 1.8mg daily  Novolin 70/30:   40 units breakfast, 40 units dinner (NOT TAKING REGULARLY, only when sugars high)  Correction 1:15>150    Review of home blood glucose:  No log or meter with him today  Reports AM sugars are generally 107 - 191, variable     Review of most recent diabetes-related labs:  Lab Results   Component Value Date    HBA1C 10.5 (H) 11/16/2016    HBA1C 5.7 01/19/2011    EFB0QRQM 6.6 10/09/2018    BGL3GKUD 10.7 02/27/2018    RXA7ZWET 7.1 08/25/2017    CHOL 157 02/27/2018    LDLC 61 02/27/2018    GFRAA 16 (L) 02/16/2019    GFRNA 14 (L) 02/16/2019    CREATEXT 5.20 09/13/2018    MCACR 1,160.8 (H) 02/27/2018    MALBEXT HIGH 4737.5 10/02/2017    TSH 1.39 10/29/2016         PAST MEDICAL/SURGICAL HISTORY:   Past Medical History:   Diagnosis Date    Arthritis     spine    CAD (coronary artery disease)     high cholesterol    Chronic kidney disease     Diabetes ([facility] [address]     GERD (gastroesophageal reflux disease)     HX    Hypertension     Ill-defined condition     irritable bowel syndrome    Unspecified sleep apnea     NO CPAP     Past Surgical History:   Procedure Laterality Date    COLONOSCOPY N/A 11/9/2016    COLONOSCOPY performed by Marylu Avitia MD at [facility] ENDOSCOPY    HX ORTHOPAEDIC      CERVICAL FUSION    HX UROLOGICAL TURP    HX VASCULAR ACCESS      L arm dialysis access       ALLERGIES:   Allergies   Allergen Reactions    Albumin, Human 25 % Anaphylaxis and Hives    Niacin Hives       MEDICATIONS ON ADMISSION:     Current Outpatient Medications:     sertraline (ZOLOFT) 50 mg tablet, TAKE 1 T PO QD, Disp: , Rfl: 0    aspirin 81 mg chewable tablet, Take 81 mg by mouth daily. , Disp: , Rfl:     VICTOZA 2-NELLI 0.6 mg/0.1 mL (18 mg/3 mL) pnij, INJECT 1.8MG  SUBCUTANEOUSLY ONCE DAILY, TITRATE UP TO 1.8 MG DAILY AS DIRECTED, Disp: 9 mL, Rfl: 0    carvedilol (COREG) 12.5 mg tablet, , Disp: , Rfl:     Insulin Needles, Disposable, (REYNALDO PEN NEEDLE) 32 gauge x 5/32\" ndle, For use each AM with Victoza Pen, Disp: 100 Pen Needle, Rfl: 3    Insulin Syringe-Needle U-100 1 mL 30 gauge x 5/16 syrg, Use twice daily for insulin injection, Disp: 100 Syringe, Rfl: 7    insulin NPH/insulin regular (NOVOLIN 70/30 U-100 INSULIN) 100 unit/mL (70-30) injection, 60 Units by SubCUTAneous route Before breakfast and dinner. (Patient taking differently: 40 Units by SubCUTAneous route Before breakfast and dinner. Uses if blood sugar is over 200), Disp: 10 Vial, Rfl: 3    hydrALAZINE (APRESOLINE) 100 mg tablet, 100 mg three (3) times daily. , Disp: , Rfl:     fenofibrate nanocrystallized (TRICOR) 145 mg tablet, Take  by mouth every evening., Disp: , Rfl:     bumetanide (BUMEX) 2 mg tablet, Take 2 mg by mouth every evening., Disp: , Rfl:     atorvastatin (LIPITOR) 20 mg tablet, Take 20 mg by mouth every evening., Disp: , Rfl:     Calcium-Cholecalciferol, D3, (CALCIUM 600 WITH VITAMIN D3) 600 mg(1,500mg) -400 unit chew, Take 1 Tab by mouth every evening., Disp: , Rfl:     fluticasone (FLONASE) 50 mcg/actuation nasal spray, 2 Sprays by Both Nostrils route daily. , Disp: , Rfl:     tamsulosin (FLOMAX) 0.4 mg capsule, Take 0.4 mg by mouth two (2) times a day., Disp: , Rfl:     ciprofloxacin HCl (CIPRO) 500 mg tablet, Take 500 mg by mouth two (2) times a day., Disp: , Rfl:     sertraline (ZOLOFT) 25 mg tablet, TK 1 T PO QD, Disp: , Rfl:     SOCIAL HISTORY:   Social History     Socioeconomic History    Marital status: UNKNOWN     Spouse name: Not on file    Number of children: Not on file    Years of education: Not on file    Highest education level: Not on file   Occupational History    Not on file   Social Needs    Financial resource strain: Not on file    Food insecurity:     Worry: Not on file     Inability: Not on file    Transportation needs:     Medical: Not on file     Non-medical: Not on file   Tobacco Use    Smoking status: Former Smoker     Last attempt to quit: 2009     Years since quitting: 10.2    Smokeless tobacco: Former User    Tobacco comment: cigars only   Substance and Sexual Activity    Alcohol use: No    Drug use: No    Sexual activity: Not Currently   Lifestyle    Physical activity:     Days per week: Not on file     Minutes per session: Not on file    Stress: Not on file   Relationships    Social connections:     Talks on phone: Not on file     Gets together: Not on file     Attends [employer] service: Not on file     Active member of club or organization: Not on file     Attends meetings of clubs or organizations: Not on file     Relationship status: Not on file    Intimate partner violence:     Fear of current or ex partner: Not on file     Emotionally abused: Not on file     Physically abused: Not on file     Forced sexual activity: Not on file   Other Topics Concern    Not on file   Social History Narrative    Not on file       FAMILY HISTORY:  Family History   Problem Relation Age of Onset    Cancer Father         \"bone\"    Diabetes Brother        REVIEW OF SYSTEMS: Complete ROS assessed and noted for that which is described above, all else are negative.   Eyes: normal  ENT: normal  CVS: normal  Resp: normal  GI: normal  : normal  GYN: normal  Endocrine: normal  Integument: normal  Musculoskeletal: knee discomfort  Neuro: normal  Psych: normal      PHYSICAL EXAMINATION:    VITAL SIGNS:  Visit Vitals  /81 (BP 1 Location: Right arm, BP Patient Position: Sitting)   Pulse 79   Ht 6' (1.829 m)   Wt 242 lb (109.8 kg)   BMI 32.82 kg/m²       GENERAL: NCAT, Sitting comfortably, NAD  EYES: EOMI, non-icteric, no proptosis  Ear/Nose/Throat: NCAT, no inflammation, no masses  LYMPH NODES: No LAD  CARDIOVASCULAR: S1 S2, RRR, No murmur  RESPIRATORY: CTA b/l, no wheeze/rales  GASTROINTESTINAL: obese, NT, ND,  MUSCULOSKELETAL: Normal ROM, no atrophy  SKIN: warm, no edema/rash/ or other skin changes  NEUROLOGIC: 5/5 power all extremities, no tremors, AAOx3  PSYCHIATRIC: Normal affect, Normal insight and judgement    REVIEW OF LABORATORY AND RADIOLOGY DATA:   Labs and documentation have been reviewed as described above. ASSESSMENT AND PLAN:   Brii Burnett is a 68 y.o. male with a PMHx as noted above who presents to the endocrinology clinic for evaluation of uncontrolled type 2 diabetes. DM2  HTN  HLD    He is not taking his insulin regularly after starting dialysis as his sugars have improved a bit. This is also because he is off the acthar since starting dialysis. We noted that because he only takes the insulin when sugars are high, he is having fluctuations in his sugars up and down. Rather than taking intermittent 20-40 units bolus only when sugars are high, we can have him trial something like 15 units with breakfast and dinner regularly to avoid the fluctuations. DM2:  Continue victoza, 1.8 daily  Novolin 70/30:  15 units with breakfast and dinner  Continue to check glucose 2-3 times daily,    HTN: Stable on current meds  HLD: on statin, advised he needs level, has fistula and will have checked with dialysis, ok    4 month f/u visit. Advised to call with any concerns,    Norma Linares.  [address] [facility] no

## 2020-09-21 NOTE — ED PROVIDER NOTE - NSFOLLOWUPINSTRUCTIONS_ED_ALL_ED_FT
Take medications as prescribed. Use urine strainer to catch stone and bring to specialist.  Followup with urologist above for reevaluation.  Return to ED if you develop fever>100.8F, vomiting or worsening pain.      Renal Colic    WHAT YOU NEED TO KNOW:    Renal colic is severe pain in your lower back or sides. The pain is usually on one side, but may be on both sides of your lower back. Renal colic may start quickly, come and go, and become worse over time. Renal colic is caused by a blockage in your urinary tract. The most common cause of a blockage is a kidney stone. Blood clots, ureter spasms, and dead tissue may also block your urinary tract.    Female Urinary System                DISCHARGE INSTRUCTIONS:    Return to the emergency department if:   •You cannot stop vomiting.      •You see new or increased bleeding when you urinate.      •You are urinating less than usual, or not at all.      •Your pain is not getting better even after treatment.      Call your doctor if:   •You have fever.      •You need to urinate more often than usual, or right away.      •You see a stone in your urine strainer after you urinate.      •You have questions or concerns about your condition or care.      Medicines:   •Medicines may help decrease pain and muscle spasms. You may also need medicine to calm your stomach and stop vomiting.      •Take your medicine as directed. Contact your healthcare provider if you think your medicine is not helping or if you have side effects. Tell him of her if you are allergic to any medicine. Keep a list of the medicines, vitamins, and herbs you take. Include the amounts, and when and why you take them. Bring the list or the pill bottles to follow-up visits. Carry your medicine list with you in case of an emergency.      Manage your symptoms:   •Drink liquids as directed. This will help decrease pain and flush blockages from your urinary tract. Ask how much liquid to drink each day and which liquids are best for you. You may need to drink about 3 liters (12 glasses) of liquids each day. Half of your total daily liquids should be water. Limit coffee, tea, and soda to 2 cups daily. Your urine should be pale and clear.      •Strain your urine every time you urinate. Urinate into a strainer (funnel with a fine mesh on the bottom) or glass jar to collect kidney stones. Give the kidney stones to your healthcare provider at your next visit.  Look for Stones in the Filter           •Eat a variety of healthy foods. Healthy foods include fruits, vegetables, whole-grain breads, low-fat dairy products, beans, lean meats, and fish. You may need to increase the amount of citrus fruit you eat, such as oranges. Ask your healthcare provider how much salt, calcium, and protein you should eat.  Healthy Foods           •Avoid activity in hot temperatures. Heat may cause you to become dehydrated and urinate less.      Follow up with your doctor as directed: You may need to return for tests to check if your blockage has cleared. Write down your questions so you remember to ask them during your visits.

## 2020-09-21 NOTE — ED ADULT NURSE NOTE - OBJECTIVE STATEMENT
pt aaox3 ,with complaint of left flank pain since 2 am with nausea,denies chest pain,no shortness of breath

## 2020-09-21 NOTE — ED PROVIDER NOTE - PATIENT PORTAL LINK FT
You can access the FollowMyHealth Patient Portal offered by [facility] by registering at the following website: [email or web address]. By joining [facility]’s FollowMyHealth portal, you will also be able to view your health information using other applications (apps) compatible with our system.

## 2020-09-21 NOTE — ED PROVIDER NOTE - OBJECTIVE STATEMENT
49yo F with hx DM and kidney stones presents with left flank pain. Reports pain onset at 2am today, reports pain over left flank radiating to lower abdomen, reports vomiting and 2 BMs. took 2 tylenols at 3am without improvement of pain.  Denies fevers, dysuria or hematuria.

## 2020-09-21 NOTE — ED ADULT NURSE NOTE - NSIMPLEMENTINTERV_GEN_ALL_ED
Implemented All Universal Safety Interventions:  [place] to call system. Call bell, personal items and telephone within reach. Instruct patient to call for assistance. Room bathroom lighting operational. Non-slip footwear when patient is off stretcher. Physically safe environment: no spills, clutter or unnecessary equipment. Stretcher in lowest position, wheels locked, appropriate side rails in place.

## 2020-09-21 NOTE — ED PROVIDER NOTE - CLINICAL SUMMARY MEDICAL DECISION MAKING FREE TEXT BOX
51yo F with hx DM and kidney stones presents with left flank pain. Will obtain labs, UA, CT A/P. Given IVF and morphine for pain. Will reassess. 49yo F with hx DM and kidney stones presents with left flank pain. Will obtain labs, UA, CT A/P. Given IVF and morphine for pain. Will reassess.    labs show wbc 13K, UA shows blood, micro pending  CT results pending  discussed above with patient. On reeval patient reports she just vomited and reports persistent severe flank pain, will give zofran, morphine/toradol, will reassess  patient signedout to Dr. Aponte at 730am. 49yo F with hx DM and kidney stones presents with left flank pain. Will obtain labs, UA, CT A/P. Given IVF and morphine for pain. Will reassess.    labs show wbc 13K, UA shows blood, micro pending  CT shows Mild left hydroureteronephrosis with left perinephric and left periureteral fatty stranding, that is second to an obstructing 3 mm distal left ureteral stone. Additional punctate bilateral nonobstructing renal stones.Hepatomegaly and hepatic steatosis. Stable 8 mm soft tissue nodule within the left retroareolar breast.  discussed above with patient. On reeval patient reports she just vomited and reports persistent severe flank pain, will give zofran, morphine/toradol, will reassess  patient signedout to Dr. Aponte at 730am.

## 2020-09-21 NOTE — ED PROVIDER NOTE - CARE PROVIDER_API CALL
Gorge Redd  UROLOGY  [address], [address]  [place] [address]  Phone: [phone]  Fax: [phone]  Follow Up Time:

## 2020-09-22 LAB
CULTURE RESULTS: SIGNIFICANT CHANGE UP
SPECIMEN SOURCE: SIGNIFICANT CHANGE UP

## 2020-09-23 ENCOUNTER — APPOINTMENT (OUTPATIENT)
Dept: UROLOGY | Facility: CLINIC | Age: 50
End: 2020-09-23
Payer: COMMERCIAL

## 2020-09-23 VITALS — HEART RATE: 103 BPM | TEMPERATURE: 97.5 F | SYSTOLIC BLOOD PRESSURE: 132 MMHG | DIASTOLIC BLOOD PRESSURE: 86 MMHG

## 2020-09-23 DIAGNOSIS — Z86.39 PERSONAL HISTORY OF OTHER ENDOCRINE, NUTRITIONAL AND METABOLIC DISEASE: ICD-10-CM

## 2020-09-23 DIAGNOSIS — Z87.442 PERSONAL HISTORY OF URINARY CALCULI: ICD-10-CM

## 2020-09-23 DIAGNOSIS — Z80.9 FAMILY HISTORY OF MALIGNANT NEOPLASM, UNSPECIFIED: ICD-10-CM

## 2020-09-23 PROCEDURE — 99204 OFFICE O/P NEW MOD 45 MIN: CPT

## 2020-09-23 RX ORDER — TAMSULOSIN HYDROCHLORIDE 0.4 MG/1
CAPSULE ORAL
Refills: 0 | Status: ACTIVE | COMMUNITY

## 2020-09-29 LAB — NIDUS STONE QN: NORMAL

## 2020-09-29 NOTE — ASSESSMENT
[FreeTextEntry1] : stoen analysis\par f/u sono \par 24 hr collection\par   \par Recommended Kidney stone prevention diet:\par - Good oral hydration so that urine is clear to light yellow, usually 1.5 to 2 Liters of fluids, mainly water\par - Less red meat\par - Less salt\par - Limit foods with oxalate like- dark green vegetables, rhubarb, chocolate, wheat bran, nuts, cranberries, and beans\par - Increase citrate in diet by consuming citrus fruits and juices. Discussed that linsey and limes have the most citric acid, oranges, grapefruits, and berries also contain appreciable amounts.\par

## 2020-09-29 NOTE — REVIEW OF SYSTEMS
[History of kidney stones] : history of kidney stones [Wake up at night to urinate  How many times?  ___] : wakes up to urinate [unfilled] times during the night [Leakage of urine with urgency] : leakage of urine with urgency [Leakage of urine with straining, coughing, laughing] : leakage of urine with straining, coughing, laughing [Negative] : Heme/Lymph [Urine Infection (bladder/kidney)] : denies bladder/kidney infections [Pain during urination] : denies pain during urination [Pain at onset of urination] : denies pain during onset of urination [Pain after urination] : denies pain after urination [Blood in urine that you can see] : denies seeing blood in urine [Told you have blood in urine on a urine test] : denies being told that blood was present in a urine test [Discharge from urine canal] : denies discharge from urine canal [Bladder pressure] : denies bladder pressure [Strain or push to urinate] : denies straining or pushing to urinate [Wait a long time to urinate] : denies waiting a long time to urinate [Slow urine stream] : denies slow urine stream [Interrupted urine stream] : denies interrupted urine stream [Increased pain/discomfort with bladder filling] : denies increased pain/discomfort with bladder filling [Unaware of when urine is leaking] : aware of when urine is leaking [FreeTextEntry6] : frequent urination

## 2020-09-29 NOTE — HISTORY OF PRESENT ILLNESS
[FreeTextEntry1] : Chief Complaint: pain, flank and c/o left flank pain.\par \par - Chief Complaint: The patient is a 50y Female complaining of pain, flank.\par - HPI Objective Statement: 49yo F with hx DM and kidney stones presents with\par left flank pain. Reports pain onset at 2am today, reports pain over left flank\par radiating to lower abdomen, reports vomiting and 2 BMs. took 2 tylenols at 3am\par without improvement of pain. Denies fevers, dysuria or hematuria.\par ct 3 mm left ureteral stone and several small nonobstructive stone \par \par pased stone\par PAST MEDICAL/SURGICAL/FAMILY/SOCIAL HISTORY:\par Past Medical History:\par Diabetes\par Gout\par Kidney stone.\par \par Tobacco Usage:\par - Tobacco Usage Never smoker\par \par ALLERGIES AND HOME MEDICATIONS:\par Allergies:\par  Allergies:\par 	No Known Allergies:\par \par Home Medications:\par * Patient Currently Takes Medications as of 24-Jul-2019 18:02 documented in\par Structured Notes\par - Alivio 600 mg oral tablet: 1 tab(s) orally every 6 hours, As Needed\par - oxycodone-acetaminophen 5 mg-325 mg oral tablet: 1 tab(s) orally every 12\par hours, As Needed MDD:2\par - cefuroxime 500 mg oral tablet: 1 tab(s) orally every 12 hours\par - metoprolol tartrate 25 mg oral tablet: 1 tab(s) orally once a day\par - atorvastatin 40 mg oral tablet: 1 tab(s) orally once a day (at bedtime)\par - Flomax 0.4 mg oral capsule: 1 cap(s) orally once a day\par - colchicine 0.6 mg oral tablet: 1 tab(s) orally 2 times a day\par - allopurinol 300 mg oral tablet: 1 tab(s) orally once a day\par - pantoprazole 20 mg oral delayed release tablet: 1 tab(s) orally once a day\par - Xigduo XR 5 mg-1000 mg oral tablet, extended release: 1 tab(s) orally once a\par day (in the morning)

## 2020-10-23 ENCOUNTER — APPOINTMENT (OUTPATIENT)
Dept: OBGYN | Facility: CLINIC | Age: 50
End: 2020-10-23

## 2020-10-23 ENCOUNTER — APPOINTMENT (OUTPATIENT)
Dept: UROLOGY | Facility: CLINIC | Age: 50
End: 2020-10-23

## 2020-11-04 PROCEDURE — 99285 EMERGENCY DEPT VISIT HI MDM: CPT

## 2020-11-05 ENCOUNTER — EMERGENCY (EMERGENCY)
Facility: HOSPITAL | Age: 50
LOS: 1 days | Discharge: HOME | End: 2020-11-05
Attending: EMERGENCY MEDICINE
Payer: COMMERCIAL

## 2020-11-05 VITALS
OXYGEN SATURATION: 98 % | DIASTOLIC BLOOD PRESSURE: 72 MMHG | HEART RATE: 74 BPM | RESPIRATION RATE: 18 BRPM | SYSTOLIC BLOOD PRESSURE: 125 MMHG

## 2020-11-05 VITALS
TEMPERATURE: 98 F | SYSTOLIC BLOOD PRESSURE: 144 MMHG | OXYGEN SATURATION: 99 % | HEART RATE: 80 BPM | WEIGHT: 151.24 LBS | HEIGHT: 62 IN | RESPIRATION RATE: 16 BRPM | DIASTOLIC BLOOD PRESSURE: 83 MMHG

## 2020-11-05 LAB
ALBUMIN SERPL ELPH-MCNC: 3.1 G/DL — LOW (ref 3.5–5)
ALP SERPL-CCNC: 101 U/L — SIGNIFICANT CHANGE UP (ref 40–120)
ALT FLD-CCNC: 27 U/L DA — SIGNIFICANT CHANGE UP (ref 10–60)
ANION GAP SERPL CALC-SCNC: 7 MMOL/L — SIGNIFICANT CHANGE UP (ref 5–17)
AST SERPL-CCNC: 26 U/L — SIGNIFICANT CHANGE UP (ref 10–40)
BILIRUB SERPL-MCNC: 0.8 MG/DL — SIGNIFICANT CHANGE UP (ref 0.2–1.2)
BUN SERPL-MCNC: 17 MG/DL — SIGNIFICANT CHANGE UP (ref 7–18)
CALCIUM SERPL-MCNC: 9 MG/DL — SIGNIFICANT CHANGE UP (ref 8.4–10.5)
CHLORIDE SERPL-SCNC: 104 MMOL/L — SIGNIFICANT CHANGE UP (ref 96–108)
CO2 SERPL-SCNC: 27 MMOL/L — SIGNIFICANT CHANGE UP (ref 22–31)
CREAT SERPL-MCNC: 0.99 MG/DL — SIGNIFICANT CHANGE UP (ref 0.5–1.3)
GLUCOSE SERPL-MCNC: 146 MG/DL — HIGH (ref 70–99)
HCT VFR BLD CALC: 37.5 % — SIGNIFICANT CHANGE UP (ref 34.5–45)
HGB BLD-MCNC: 11.9 G/DL — SIGNIFICANT CHANGE UP (ref 11.5–15.5)
MAGNESIUM SERPL-MCNC: 2.1 MG/DL — SIGNIFICANT CHANGE UP (ref 1.6–2.6)
MCHC RBC-ENTMCNC: 27.4 PG — SIGNIFICANT CHANGE UP (ref 27–34)
MCHC RBC-ENTMCNC: 31.7 GM/DL — LOW (ref 32–36)
MCV RBC AUTO: 86.2 FL — SIGNIFICANT CHANGE UP (ref 80–100)
NRBC # BLD: 0 /100 WBCS — SIGNIFICANT CHANGE UP (ref 0–0)
PLATELET # BLD AUTO: 313 K/UL — SIGNIFICANT CHANGE UP (ref 150–400)
POTASSIUM SERPL-MCNC: 4 MMOL/L — SIGNIFICANT CHANGE UP (ref 3.5–5.3)
POTASSIUM SERPL-SCNC: 4 MMOL/L — SIGNIFICANT CHANGE UP (ref 3.5–5.3)
PROT SERPL-MCNC: 7.6 G/DL — SIGNIFICANT CHANGE UP (ref 6–8.3)
RBC # BLD: 4.35 M/UL — SIGNIFICANT CHANGE UP (ref 3.8–5.2)
RBC # FLD: 12.4 % — SIGNIFICANT CHANGE UP (ref 10.3–14.5)
SODIUM SERPL-SCNC: 138 MMOL/L — SIGNIFICANT CHANGE UP (ref 135–145)
TROPONIN I SERPL-MCNC: <0.015 NG/ML — SIGNIFICANT CHANGE UP (ref 0–0.04)
TROPONIN I SERPL-MCNC: <0.015 NG/ML — SIGNIFICANT CHANGE UP (ref 0–0.04)
WBC # BLD: 8.55 K/UL — SIGNIFICANT CHANGE UP (ref 3.8–10.5)
WBC # FLD AUTO: 8.55 K/UL — SIGNIFICANT CHANGE UP (ref 3.8–10.5)

## 2020-11-05 PROCEDURE — 84484 ASSAY OF TROPONIN QUANT: CPT

## 2020-11-05 PROCEDURE — 36415 COLL VENOUS BLD VENIPUNCTURE: CPT

## 2020-11-05 PROCEDURE — 83735 ASSAY OF MAGNESIUM: CPT

## 2020-11-05 PROCEDURE — 93010 ELECTROCARDIOGRAM REPORT: CPT

## 2020-11-05 PROCEDURE — 71046 X-RAY EXAM CHEST 2 VIEWS: CPT

## 2020-11-05 PROCEDURE — 99284 EMERGENCY DEPT VISIT MOD MDM: CPT | Mod: 25

## 2020-11-05 PROCEDURE — 93005 ELECTROCARDIOGRAM TRACING: CPT

## 2020-11-05 PROCEDURE — 71046 X-RAY EXAM CHEST 2 VIEWS: CPT | Mod: 26

## 2020-11-05 PROCEDURE — 80053 COMPREHEN METABOLIC PANEL: CPT

## 2020-11-05 PROCEDURE — 85027 COMPLETE CBC AUTOMATED: CPT

## 2020-11-05 RX ORDER — PANTOPRAZOLE SODIUM 20 MG/1
1 TABLET, DELAYED RELEASE ORAL
Qty: 0 | Refills: 0 | DISCHARGE

## 2020-11-05 RX ORDER — DAPAGLIFLOZIN AND METFORMIN HYDROCHLORIDE 10; 1000 MG/1; MG/1
1 TABLET, FILM COATED, EXTENDED RELEASE ORAL
Qty: 0 | Refills: 0 | DISCHARGE

## 2020-11-05 RX ORDER — COLCHICINE 0.6 MG
1 TABLET ORAL
Qty: 0 | Refills: 0 | DISCHARGE

## 2020-11-05 RX ADMIN — Medication 30 MILLILITER(S): at 10:30

## 2020-11-05 NOTE — ED ADULT NURSE NOTE - CHPI ED NUR SYMPTOMS NEG
no shortness of breath/no chills/no nausea/no syncope/no congestion/no diaphoresis/no fever/no back pain/no vomiting

## 2020-11-05 NOTE — ED ADULT NURSE NOTE - OBJECTIVE STATEMENT
Pt AOx4, ambulatory, c/o chest pain, weakness and left foot pain x 1 week. Pt endorses intermittent dizziness, denies syncope. No neuro deficit noted. EKG done, pt placed on cardiac monitor, no signs of distress noted.

## 2020-11-05 NOTE — ED PROVIDER NOTE - PROGRESS NOTE DETAILS
EKG NSR, trops wnl. CXR and remainder of labs unremarkable. Patient reports feeling unchanged after Maalox, will continue to monitor. EKG NSR, trops wnl. CXR and remainder of labs unremarkable. Patient reports feeling unchanged after Maalox, will repeat trop and continue to monitor.

## 2020-11-05 NOTE — ED ADULT NURSE NOTE - TEMPLATE
After Visit Summary   9/22/2017    Jaren Funez    MRN: [id number]           Patient Information     Date Of Birth          1955        Visit Information        Provider Department      9/22/2017 1:50 PM Shirley Oliveros DO [facility]        Today's Diagnoses     Type 2 diabetes mellitus without complication, without long-term current use of insulin (H)    -  1    Vitamin D deficiency        Essential hypertension        Weakness of both lower extremities        Routine general medical examination at a health care facility          Care Instructions    Second floor  will help you set up physical therapy.    Letter will be sent with lab results.  Keep all medications the same, no changes.    We will see you in 6 months or sooner as needed          Follow-ups after your visit        Additional Services     PHYSICAL THERAPY REFERRAL       PT/OT REFERRAL  Jaren Funez  1955  Phone #: [phone] (home)    needed: No  Language: English    PT/OT  Facility:   [facility], P: [phone], F: [phone]    History: leg sensation changes and feels like legs are giving out on him; strength intact    Precautions/Contraindications: None    Imaging Studies: None    Surgical Procedure/Test Results: None    Treatment Goals:   Increase: Flexibility, Strength and ROM    Prognosis: excellent    Visits: Up to 12    Evaluate: Evaluate and treat    Plan: Manual Therapy, Flexibility Exercise and Strength Exercise    Ionto/Phonophoresis                  Future tests that were ordered for you today     Open Future Orders        Priority Expected Expires Ordered    PHYSICAL THERAPY REFERRAL Routine  10/31/2017 9/22/2017            Who to contact     Please call your clinic at [phone] to:    Ask questions about your health    Make or cancel appointments    Discuss your medicines    Learn about your test results    Speak to your doctor   If you have  compliments or concerns about an experience at your clinic, or if you wish to file a complaint, please contact [facility] Patient Relations at [phone] or email us at [email or web address]         Additional Information About Your Visit        Roc2Lochart Information     Jingitt is an electronic gateway that provides easy, online access to your medical records. With Heyy, you can request a clinic appointment, read your test results, renew a prescription or communicate with your care team.     To sign up for Heyy visit the website at [email or web address]   You will be asked to enter the access code listed below, as well as some personal information. Please follow the directions to create your username and password.     Your access code is: [email or web address]  Expires: 2017  2:46 PM     Your access code will  in 90 days. If you need help or a new code, please contact your [facility] or call [phone] for assistance.        Care EveryWhere ID     This is your Care EveryWhere ID. This could be used by other organizations to access your [place] medical records  [id number]        Your Vitals Were     Pulse Temperature BMI (Body Mass Index)             70 98.6  F (37  C) (Oral) 26.28 kg/m2          Blood Pressure from Last 3 Encounters:   17 117/73   17 131/76   17 148/81    Weight from Last 3 Encounters:   17 167 lb 12.8 oz (76.1 kg)   17 154 lb (69.9 kg)   17 158 lb 9.6 oz (71.9 kg)              We Performed the Following     Basic Metabolic Panel ([place])     Folate  Serum ([facility])     Hemoglobin A1c ([facility])     Lipid Panel ([place])     Methylmalonic Acid ([facility])     Microalbumin Random Ur ([facility])     Vitamin B12 ([facility])        Primary Care Provider Office Phone # Fax #    Shirley Oliveros [phone]       [address] [facility] [address]         Equal Access to Services     [facility] : Hadii aad ku hadkaronmarysol Teaganali, wanikoda luqadaha, qaybta kadallinmegan person. So [facility] [phone].    ATENCIÓN: Si habla español, tiene a owusu disposición servicios gratuitos de asistencia lingüística. Sabinaame al [phone].    We comply with applicable federal civil rights laws and [place] laws. We do not discriminate on the basis of race, color, national origin, age, disability sex, sexual orientation or gender identity.            Thank you!     Thank you for choosing [facility]  for your care. Our goal is always to provide you with excellent care. Hearing back from our patients is one way we can continue to improve our services. Please take a few minutes to complete the written survey that you may receive in the mail after your visit with us. Thank you!             Your Updated Medication List - Protect others around you: Learn how to safely use, store and throw away your medicines at www.disposemymeds.org.          This list is accurate as of: 9/22/17  2:46 PM.  Always use your most recent med list.                   Brand Name Dispense Instructions for use Diagnosis    aspirin 81 MG tablet     90 tablet    Take 1 tablet (81 mg) by mouth daily    Essential hypertension, benign       atorvastatin 40 MG tablet    LIPITOR    30 tablet    Take 1 tablet (40 mg) by mouth daily    Type 2 diabetes mellitus without complication, without long-term current use of insulin (H)       chlorthalidone 25 MG tablet    HYGROTON    45 tablet    Take 0.5 tablets (12.5 mg) by mouth daily    Essential hypertension, benign       clotrimazole 1 % cream    LOTRIMIN AF    24 g    Apply twice daily to feet.    Dermatophytosis of foot       cyclobenzaprine 5 MG tablet    FLEXERIL    42 tablet    Take 1 tablet (5 mg) by mouth 3 times daily as needed    Muscle spasms of lower extremity       metFORMIN 500 MG 24 hr tablet    GLUCOPHAGE-XR    360 tablet     Take 2 tablets (1,000 mg) by mouth daily (with dinner)    Controlled type 2 diabetes mellitus without complication, without long-term current use of insulin (H)       naproxen sodium 220 MG tablet    ANAPROX    60 tablet    Take 1 tablet (220 mg) by mouth 2 times daily (with meals)    Rib pain on left side       polyethylene glycol powder    MIRALAX    510 g    Take 17 g (1 capful) by mouth daily    Constipation, unspecified constipation type       vitamin D 2000 UNITS tablet     90 tablet    Take 2,000 Units by mouth daily    Hypovitaminosis D          Cardiac

## 2020-11-05 NOTE — ED ADULT NURSE NOTE - DRUG PRE-SCREENING (DAST -1)
Statement Selected
Log Out.    [employer] CareNotes®     :  [facility]             BACK PAIN - AfterCare(R) Instructions(ER/ED)     Back Pain    WHAT YOU NEED TO KNOW:    Back pain is common. It can be caused by many conditions, such as arthritis or the breakdown of spinal discs. Your risk for back pain is increased by injuries, lack of activity, or repeated bending and twisting. You may feel sore or stiff on one or both sides of your back. The pain may spread to your buttocks or thighs.    DISCHARGE INSTRUCTIONS:    Return to the emergency department if:     You have pain, numbness, or weakness in one or both legs.      Your pain becomes so severe that you cannot walk.      You cannot control your urine or bowel movements.      You have severe back pain with chest pain.      You have severe back pain, nausea, and vomiting.      You have severe back pain that spreads to your side or genital area.    Contact your healthcare provider if:     You have back pain that does not get better with rest and pain medicine.      You have a fever.      You have pain that worsens when you are on your back or when you rest.      You have pain that worsens when you cough or sneeze.      You lose weight without trying.      You have questions or concerns about your condition or care.    Medicines:     NSAIDs help decrease swelling and pain. This medicine is available with or without a doctor's order. NSAIDs can cause stomach bleeding or kidney problems in certain people. If you take blood thinner medicine, always ask your healthcare provider if NSAIDs are safe for you. Always read the medicine label and follow directions.      Acetaminophen decreases pain and fever. It is available without a doctor's order. Ask how much to take and how often to take it. Follow directions. Read the labels of all other medicines you are using to see if they also contain acetaminophen, or ask your doctor or pharmacist. Acetaminophen can cause liver damage if not taken correctly. Do not use more than 4 grams (4,000 milligrams) total of acetaminophen in one day.       Muscle relaxers help decrease muscle spasms and back pain.      Prescription pain medicine may be given. Ask your healthcare provider how to take this medicine safely. Some prescription pain medicines contain acetaminophen. Do not take other medicines that contain acetaminophen without talking to your healthcare provider. Too much acetaminophen may cause liver damage. Prescription pain medicine may cause constipation. Ask your healthcare provider how to prevent or treat constipation.       Take your medicine as directed. Contact your healthcare provider if you think your medicine is not helping or if you have side effects. Tell him or her if you are allergic to any medicine. Keep a list of the medicines, vitamins, and herbs you take. Include the amounts, and when and why you take them. Bring the list or the pill bottles to follow-up visits. Carry your medicine list with you in case of an emergency.    How to manage your back pain:     Apply ice on your back for 15 to 20 minutes every hour or as directed. Use an ice pack, or put crushed ice in a plastic bag. Cover it with a towel before you apply it to your skin. Ice helps prevent tissue damage and decreases pain.      Apply heat on your back for 20 to 30 minutes every 2 hours for as many days as directed. Heat helps decrease pain and muscle spasms.      Stay active as much as you can without causing more pain. Bed rest could make your back pain worse. Avoid heavy lifting until your pain is gone.      Go to physical therapy as directed. A physical therapist can teach you exercises to help improve movement and strength, and to decrease pain.    Follow up with your healthcare provider in 2 weeks, or as directed: Write down your questions so you remember to ask them during your visits.

## 2020-11-05 NOTE — ED PROVIDER NOTE - PHYSICAL EXAMINATION
General: well-groomed, mildly anxious appearing  HEENT: EOMI, PERRL  Resp: lungs CTA b/l, good air movement b/l, no increased WOB  CV: RRR, no MRG, BS appreciable in chest  GI: firm, mildly distended 2/2 body habitus, nontender  Psych: mildly anxious appearing, otherwise appropriate mood/affect/judgment   Neuro: b/l LE strength and sensation equal and intact  Skin: warm, dry  Heme: no bruising

## 2020-11-05 NOTE — ED PROVIDER NOTE - NS ED ROS FT
General: no fever, chills  HEENT: mild HA  Resp: no cough, no SOB  CV: CP, tightness, no palpitations  GI: no abdominal pain, no n/v/d/c  : no dysuria or hematuria  Neuro: intermittent weakness for several weeks, not currently experiencing  All other review of systems reviewed and are negative

## 2020-11-05 NOTE — ED PROVIDER NOTE - OBJECTIVE STATEMENT
50F PMH poorly-controlled DM (10/17 a1c 10 on outpatient labs), HLD, kidney stones, gout, previous COVID infection presenting with x1 day chest tightness. Patient reports that she began feeling the tightness after eating broccoli for dinner and originally attributed it to GERD, but became concerned when the sensation persisted this morning. Also reports mild HA and intermittent feelings of leg weakness and dizziness over the past several weeks. Of note, patient recently was found to have elevated CRP (21) and ESR (120) on routine tests with PCP, which she reports she is very concerned about. Patient also endorses anxiety related to previous COVID infection and current work from home situation. Denies fever, chills, SOB, cough, n/v/d/c, and all other symptoms.

## 2020-11-05 NOTE — ED PROVIDER NOTE - CLINICAL SUMMARY MEDICAL DECISION MAKING FREE TEXT BOX
Patient seen and examined at bedside. History and findings as above. Likely anxiety vs ACS vs GERD. EKG NSR. Will obtain trop, CBC, CMP, Mg, CXR. Will give x1 dose Maalox. 51 yo F h/o DMII, here for chest tightness since eating broccoli last night. Constant symptoms, atypical with no aob/dizziness/weakness. Pt with anxiety as well  Stress test last year normal. Edison perform EKG, cardiax enzymes and reassess

## 2020-11-05 NOTE — ED ADULT TRIAGE NOTE - BP NONINVASIVE SYSTOLIC (MM HG)
Attempted to call patient, left detailed test results on voice mail per patient's consent. Informed patient via voice mail to call clinic with any questions. 144

## 2020-11-05 NOTE — ED PROVIDER NOTE - ATTENDING CONTRIBUTION TO CARE
49 yo F h/o DMII, GERD p/w palpitations x several weeks  chest tightness after eating broccoli last night  relates stressors from working at home, lack of rest, fear of COVID  was rx med for stress she did not fill yet  no fever, cough, dizziness.   Normal stress test last year  epigastric discomfort with palp  EKG no changes  troponin negative  pt symptoms resolved after maalox, pt burped and felt better  d/w Dr. Saini who would like pt to f/u

## 2020-11-05 NOTE — ED PROVIDER NOTE - PATIENT PORTAL LINK FT
You can access the FollowMyHealth Patient Portal offered by [facility] by registering at the following website: [email or web address]. By joining [employer]’s FollowMyHealth portal, you will also be able to view your health information using other applications (apps) compatible with our system.

## 2020-11-13 NOTE — ED ADULT NURSE NOTE - NSSUHOSCREENINGYN_ED_ALL_ED
[FreeTextEntry1] : 88-year-old woman here for follow up of osteoporosis\par \par She took bisphosphonate in the past for about 9 years then was on drug holiday for 4-5 years. The patient was then prescribed 2 doses of prolia. Due to insurance issues, she had to stop prolia and was ultimately switched to Fosamax in November 2018 \par Per Dr. Mahajan's notes, the patient also had some sort of dental issue with teeth while she was on prolia. \par Patient clarifies and states that her dentist refused to do dental work while on anti-resorptives but denies any ONJ\par No interval fractures, no falls\par She takes calcium and vitamin D\par History of wrist fx 30 years ago\par Had dental implant work that were completed five years ago\par Patient is undergoing further diagnostic work up for ovarian cysts/polyps\par Has 2 sons 5 grandchildren\par Lives alone and is very active \par Restarted Prolia May 2019.  Last injection with Dr. Diaz in Nov 2019.  \par \par 
Yes - the patient is able to be screened

## 2020-11-16 NOTE — ED PROVIDER NOTE - GASTROINTESTINAL, MLM
Abdomen soft, non-tender, no guarding. outpatient f/u within 1 week of discharge outpatient f/u within 1 week of discharge. will need PET per Onc. apprec consult

## 2021-03-17 PROBLEM — E78.5 HYPERLIPIDEMIA, UNSPECIFIED: Chronic | Status: ACTIVE | Noted: 2020-11-05

## 2021-04-12 ENCOUNTER — LABORATORY RESULT (OUTPATIENT)
Age: 51
End: 2021-04-12

## 2021-04-12 ENCOUNTER — APPOINTMENT (OUTPATIENT)
Dept: OBGYN | Facility: CLINIC | Age: 51
End: 2021-04-12
Payer: COMMERCIAL

## 2021-04-12 ENCOUNTER — ASOB RESULT (OUTPATIENT)
Age: 51
End: 2021-04-12

## 2021-04-12 VITALS — DIASTOLIC BLOOD PRESSURE: 78 MMHG | BODY MASS INDEX: 25.92 KG/M2 | WEIGHT: 151 LBS | SYSTOLIC BLOOD PRESSURE: 130 MMHG

## 2021-04-12 DIAGNOSIS — N39.3 STRESS INCONTINENCE (FEMALE) (MALE): ICD-10-CM

## 2021-04-12 DIAGNOSIS — N89.8 OTHER SPECIFIED NONINFLAMMATORY DISORDERS OF VAGINA: ICD-10-CM

## 2021-04-12 PROCEDURE — 76856 US EXAM PELVIC COMPLETE: CPT

## 2021-04-12 PROCEDURE — 99072 ADDL SUPL MATRL&STAF TM PHE: CPT

## 2021-04-12 PROCEDURE — 99396 PREV VISIT EST AGE 40-64: CPT

## 2021-04-12 NOTE — PHYSICAL EXAM
[Examination Of The Breasts] : a normal appearance [No Masses] : no breast masses were palpable [Labia Majora] : normal [Labia Minora] : normal [Normal] : normal [Uterine Adnexae] : normal [Atrophy] : atrophy [Cystocele] : a cystocele

## 2021-04-12 NOTE — HISTORY OF PRESENT ILLNESS
[FreeTextEntry1] : here for annual \par complains of urinary incontinence \par sexually active / complains of vaginal dryness \par mammogram done a few days ago

## 2021-04-12 NOTE — PLAN
[FreeTextEntry1] : pelvic sono / fibroids same size\par pap sent \par needs appt w/ Dr RASMUSSEN for cystocele repair

## 2021-04-24 ENCOUNTER — TRANSCRIPTION ENCOUNTER (OUTPATIENT)
Age: 51
End: 2021-04-24

## 2021-05-12 ENCOUNTER — APPOINTMENT (OUTPATIENT)
Dept: OBGYN | Facility: CLINIC | Age: 51
End: 2021-05-12

## 2022-03-30 ENCOUNTER — EMERGENCY (EMERGENCY)
Facility: HOSPITAL | Age: 52
LOS: 1 days | Discharge: HOME | End: 2022-03-30
Attending: STUDENT IN AN ORGANIZED HEALTH CARE EDUCATION/TRAINING PROGRAM
Payer: COMMERCIAL

## 2022-03-30 VITALS
SYSTOLIC BLOOD PRESSURE: 100 MMHG | TEMPERATURE: 98 F | DIASTOLIC BLOOD PRESSURE: 54 MMHG | OXYGEN SATURATION: 100 % | HEIGHT: 62 IN | HEART RATE: 76 BPM | RESPIRATION RATE: 16 BRPM | WEIGHT: 147.05 LBS

## 2022-03-30 LAB
ALBUMIN SERPL ELPH-MCNC: 3.4 G/DL — LOW (ref 3.5–5)
ALP SERPL-CCNC: 103 U/L — SIGNIFICANT CHANGE UP (ref 40–120)
ALT FLD-CCNC: 29 U/L DA — SIGNIFICANT CHANGE UP (ref 10–60)
ANION GAP SERPL CALC-SCNC: 8 MMOL/L — SIGNIFICANT CHANGE UP (ref 5–17)
APPEARANCE UR: CLEAR — SIGNIFICANT CHANGE UP
AST SERPL-CCNC: 25 U/L — SIGNIFICANT CHANGE UP (ref 10–40)
BACTERIA # UR AUTO: ABNORMAL /HPF
BASOPHILS # BLD AUTO: 0.11 K/UL — SIGNIFICANT CHANGE UP (ref 0–0.2)
BASOPHILS NFR BLD AUTO: 0.7 % — SIGNIFICANT CHANGE UP (ref 0–2)
BILIRUB SERPL-MCNC: 0.7 MG/DL — SIGNIFICANT CHANGE UP (ref 0.2–1.2)
BILIRUB UR-MCNC: NEGATIVE — SIGNIFICANT CHANGE UP
BUN SERPL-MCNC: 22 MG/DL — HIGH (ref 7–18)
CALCIUM SERPL-MCNC: 8.9 MG/DL — SIGNIFICANT CHANGE UP (ref 8.4–10.5)
CHLORIDE SERPL-SCNC: 102 MMOL/L — SIGNIFICANT CHANGE UP (ref 96–108)
CO2 SERPL-SCNC: 24 MMOL/L — SIGNIFICANT CHANGE UP (ref 22–31)
COLOR SPEC: YELLOW — SIGNIFICANT CHANGE UP
CREAT SERPL-MCNC: 1.22 MG/DL — SIGNIFICANT CHANGE UP (ref 0.5–1.3)
DIFF PNL FLD: ABNORMAL
EGFR: 53 ML/MIN/1.73M2 — LOW
EOSINOPHIL # BLD AUTO: 0.19 K/UL — SIGNIFICANT CHANGE UP (ref 0–0.5)
EOSINOPHIL NFR BLD AUTO: 1.2 % — SIGNIFICANT CHANGE UP (ref 0–6)
EPI CELLS # UR: ABNORMAL /HPF
GLUCOSE SERPL-MCNC: 277 MG/DL — HIGH (ref 70–99)
GLUCOSE UR QL: 250
HCT VFR BLD CALC: 38.8 % — SIGNIFICANT CHANGE UP (ref 34.5–45)
HGB BLD-MCNC: 13 G/DL — SIGNIFICANT CHANGE UP (ref 11.5–15.5)
IMM GRANULOCYTES NFR BLD AUTO: 0.4 % — SIGNIFICANT CHANGE UP (ref 0–1.5)
KETONES UR-MCNC: NEGATIVE — SIGNIFICANT CHANGE UP
LEUKOCYTE ESTERASE UR-ACNC: NEGATIVE — SIGNIFICANT CHANGE UP
LIDOCAIN IGE QN: 129 U/L — SIGNIFICANT CHANGE UP (ref 73–393)
LYMPHOCYTES # BLD AUTO: 26.3 % — SIGNIFICANT CHANGE UP (ref 13–44)
LYMPHOCYTES # BLD AUTO: 4.27 K/UL — HIGH (ref 1–3.3)
MCHC RBC-ENTMCNC: 28 PG — SIGNIFICANT CHANGE UP (ref 27–34)
MCHC RBC-ENTMCNC: 33.5 GM/DL — SIGNIFICANT CHANGE UP (ref 32–36)
MCV RBC AUTO: 83.4 FL — SIGNIFICANT CHANGE UP (ref 80–100)
MONOCYTES # BLD AUTO: 0.84 K/UL — SIGNIFICANT CHANGE UP (ref 0–0.9)
MONOCYTES NFR BLD AUTO: 5.2 % — SIGNIFICANT CHANGE UP (ref 2–14)
NEUTROPHILS # BLD AUTO: 10.77 K/UL — HIGH (ref 1.8–7.4)
NEUTROPHILS NFR BLD AUTO: 66.2 % — SIGNIFICANT CHANGE UP (ref 43–77)
NITRITE UR-MCNC: NEGATIVE — SIGNIFICANT CHANGE UP
NRBC # BLD: 0 /100 WBCS — SIGNIFICANT CHANGE UP (ref 0–0)
PH UR: 5 — SIGNIFICANT CHANGE UP (ref 5–8)
PLATELET # BLD AUTO: 367 K/UL — SIGNIFICANT CHANGE UP (ref 150–400)
POTASSIUM SERPL-MCNC: 3.9 MMOL/L — SIGNIFICANT CHANGE UP (ref 3.5–5.3)
POTASSIUM SERPL-SCNC: 3.9 MMOL/L — SIGNIFICANT CHANGE UP (ref 3.5–5.3)
PROT SERPL-MCNC: 8.3 G/DL — SIGNIFICANT CHANGE UP (ref 6–8.3)
PROT UR-MCNC: 30 MG/DL
RBC # BLD: 4.65 M/UL — SIGNIFICANT CHANGE UP (ref 3.8–5.2)
RBC # FLD: 12 % — SIGNIFICANT CHANGE UP (ref 10.3–14.5)
RBC CASTS # UR COMP ASSIST: SIGNIFICANT CHANGE UP /HPF (ref 0–2)
SODIUM SERPL-SCNC: 134 MMOL/L — LOW (ref 135–145)
SP GR SPEC: 1.02 — SIGNIFICANT CHANGE UP (ref 1.01–1.02)
URATE CRY FLD QL MICRO: ABNORMAL /HPF
UROBILINOGEN FLD QL: NEGATIVE — SIGNIFICANT CHANGE UP
WBC # BLD: 16.24 K/UL — HIGH (ref 3.8–10.5)
WBC # FLD AUTO: 16.24 K/UL — HIGH (ref 3.8–10.5)
WBC UR QL: SIGNIFICANT CHANGE UP /HPF (ref 0–5)

## 2022-03-30 PROCEDURE — 99285 EMERGENCY DEPT VISIT HI MDM: CPT

## 2022-03-30 RX ORDER — SODIUM CHLORIDE 9 MG/ML
1000 INJECTION INTRAMUSCULAR; INTRAVENOUS; SUBCUTANEOUS ONCE
Refills: 0 | Status: COMPLETED | OUTPATIENT
Start: 2022-03-30 | End: 2022-03-30

## 2022-03-30 RX ORDER — KETOROLAC TROMETHAMINE 30 MG/ML
15 SYRINGE (ML) INJECTION ONCE
Refills: 0 | Status: DISCONTINUED | OUTPATIENT
Start: 2022-03-30 | End: 2022-03-30

## 2022-03-30 RX ORDER — ONDANSETRON 8 MG/1
4 TABLET, FILM COATED ORAL ONCE
Refills: 0 | Status: COMPLETED | OUTPATIENT
Start: 2022-03-30 | End: 2022-03-30

## 2022-03-30 RX ADMIN — SODIUM CHLORIDE 1000 MILLILITER(S): 9 INJECTION INTRAMUSCULAR; INTRAVENOUS; SUBCUTANEOUS at 21:55

## 2022-03-30 RX ADMIN — Medication 15 MILLIGRAM(S): at 21:56

## 2022-03-30 NOTE — ED PROVIDER NOTE - CLINICAL SUMMARY MEDICAL DECISION MAKING FREE TEXT BOX
Patient p/w right lower back pain radiating to the groin with RLQ TTP on exam. Will get UA and CT abdomen. Patient stable and will reassess. Patient p/w right lower back pain radiating to the groin with RLQ TTP on exam. Will get UA and CT abdomen. Patient stable and will reassess.    UA contaminated with no other signs of infection. Labs showing mild WBC elevation. CT showing 3mm stone with no other path. On reassessment pt states that her pain has resolved. Rec  f/u ASAP. Discussed with pt my clinical impression and results, patient given strict return precautions if persistent or worsening of symptoms occurs, and need for close follow up. Pt expressed understanding and agrees with plan. Pt is well appearing with a reassuring exam. Discharge home with PMD or Specialist f/u within 5 days.

## 2022-03-30 NOTE — ED PROVIDER NOTE - PATIENT PORTAL LINK FT
You can access the FollowMyHealth Patient Portal offered by [facility] by registering at the following website: [email or web address]. By joining [employer]’s FollowMyHealth portal, you will also be able to view your health information using other applications (apps) compatible with our system.

## 2022-03-30 NOTE — ED ADULT NURSE NOTE - NSSUHOSCREENINGYN_ED_ALL_ED
Yes - the patient is able to be screened

## 2022-03-30 NOTE — ED PROVIDER NOTE - NSFOLLOWUPCLINICS_GEN_ALL_ED_FT
[facility]  Urology  [address] [place] [address]  Phone: [phone]  Fax: [phone]  Follow Up Time: 1-3 Days

## 2022-03-30 NOTE — ED PROVIDER NOTE - OBJECTIVE STATEMENT
50 y/o female with history of DM, gout, kidney stones, p/w pain from right lower back to groin starting this morning with some vomiting and burning with urination and no other associated symptoms. Patient states that pain does not feel similar to kidney stone pain and burning with urination does not feel like past UTI. Patient took pain meds, Tylenol and Codeine around 7PM, but did not provide relief. Patient denies any history of abdominal surgeries or recent antibiotics. Patient denies fever, chest pain, shortness of breath, cough, diarrhea, focal numbness/weakness, syncope, or any other recent illnesses and hospitalizations.

## 2022-03-30 NOTE — ED PROVIDER NOTE - NSFOLLOWUPINSTRUCTIONS_ED_ALL_ED_FT
You were seen in the emergency room today for pain from a kidney stone. Please obtain the next available appointment with the Urology doctors. Please call your primary doctor to inform them of this ER visit and obtain the next available appointment within the next 5 days. As we discussed, return to the ER if you have any worsening symptoms.    We no longer feel that you need further emergency care or admission to the hospital at this time.    While we have determined that you are currently stable for discharge, we know that things can change. Please seek immediate medical attention or return to the ER if you experience any of the following:  Any worsening or persistent symptoms  Severe Pain  Chest Pain  Difficulty Breathing  Bleeding  Passing Out  Severe Rash  Inability to Eat or Drink  Persistent Fever    Please see a primary care doctor or specialist within 5 days to ensure that you are improving.    Please call the [employer] phone numbers on this document if you have any problems obtaining a follow up appointment.    I wish you well! -Dr Zavaleta

## 2022-03-30 NOTE — ED PROVIDER NOTE - PHYSICAL EXAMINATION
Vital Signs Reviewed  GEN: Comfortable, NAD, AAOx3  HEENT: NCAT, MMM, Neck Supple  RESP: CTAB, No rales/rhonchi/wheezing  CV: RRR, S1S2, No murmurs  ABD: RLQ TTP without guarding, Soft, ND, No masses, No CVA Tenderness  Extrem/Skin: Equal pulses bilat, No cyanosis/edema/rashes  Neuro: No focal deficits

## 2022-03-31 ENCOUNTER — APPOINTMENT (OUTPATIENT)
Dept: UROLOGY | Facility: CLINIC | Age: 52
End: 2022-03-31
Payer: COMMERCIAL

## 2022-03-31 VITALS
HEART RATE: 67 BPM | DIASTOLIC BLOOD PRESSURE: 65 MMHG | OXYGEN SATURATION: 99 % | SYSTOLIC BLOOD PRESSURE: 123 MMHG | RESPIRATION RATE: 16 BRPM

## 2022-03-31 PROCEDURE — 74177 CT ABD & PELVIS W/CONTRAST: CPT | Mod: MA

## 2022-03-31 PROCEDURE — 96374 THER/PROPH/DIAG INJ IV PUSH: CPT | Mod: XU

## 2022-03-31 PROCEDURE — 84702 CHORIONIC GONADOTROPIN TEST: CPT

## 2022-03-31 PROCEDURE — 87086 URINE CULTURE/COLONY COUNT: CPT

## 2022-03-31 PROCEDURE — 85025 COMPLETE CBC W/AUTO DIFF WBC: CPT

## 2022-03-31 PROCEDURE — 99214 OFFICE O/P EST MOD 30 MIN: CPT

## 2022-03-31 PROCEDURE — 96375 TX/PRO/DX INJ NEW DRUG ADDON: CPT

## 2022-03-31 PROCEDURE — 99284 EMERGENCY DEPT VISIT MOD MDM: CPT | Mod: 25

## 2022-03-31 PROCEDURE — 80053 COMPREHEN METABOLIC PANEL: CPT

## 2022-03-31 PROCEDURE — 36415 COLL VENOUS BLD VENIPUNCTURE: CPT

## 2022-03-31 PROCEDURE — 74177 CT ABD & PELVIS W/CONTRAST: CPT | Mod: 26,MA

## 2022-03-31 PROCEDURE — 83690 ASSAY OF LIPASE: CPT

## 2022-03-31 PROCEDURE — 81001 URINALYSIS AUTO W/SCOPE: CPT

## 2022-03-31 RX ORDER — MORPHINE SULFATE 50 MG/1
4 CAPSULE, EXTENDED RELEASE ORAL ONCE
Refills: 0 | Status: DISCONTINUED | OUTPATIENT
Start: 2022-03-31 | End: 2022-03-31

## 2022-03-31 RX ORDER — OXYCODONE AND ACETAMINOPHEN 5; 325 MG/1; MG/1
5-325 TABLET ORAL
Qty: 12 | Refills: 0 | Status: ACTIVE | COMMUNITY
Start: 2022-03-31 | End: 1900-01-01

## 2022-03-31 RX ORDER — IBUPROFEN 800 MG/1
800 TABLET, FILM COATED ORAL 3 TIMES DAILY
Qty: 21 | Refills: 0 | Status: ACTIVE | COMMUNITY
Start: 2022-03-31 | End: 1900-01-01

## 2022-03-31 RX ORDER — TAMSULOSIN HYDROCHLORIDE 0.4 MG/1
0.4 CAPSULE ORAL
Qty: 30 | Refills: 0 | Status: ACTIVE | COMMUNITY
Start: 2022-03-31 | End: 1900-01-01

## 2022-03-31 RX ADMIN — ONDANSETRON 4 MILLIGRAM(S): 8 TABLET, FILM COATED ORAL at 00:14

## 2022-03-31 RX ADMIN — MORPHINE SULFATE 4 MILLIGRAM(S): 50 CAPSULE, EXTENDED RELEASE ORAL at 02:04

## 2022-03-31 NOTE — PHYSICAL EXAM
[General Appearance - Well Developed] : well developed [General Appearance - Well Nourished] : well nourished [Normal Appearance] : normal appearance [Well Groomed] : well groomed [General Appearance - In No Acute Distress] : no acute distress [Abdomen Soft] : soft [Abdomen Tenderness] : non-tender [Urinary Bladder Findings] : the bladder was normal on palpation [Edema] : no peripheral edema [] : no respiratory distress [Respiration, Rhythm And Depth] : normal respiratory rhythm and effort [Exaggerated Use Of Accessory Muscles For Inspiration] : no accessory muscle use [Oriented To Time, Place, And Person] : oriented to person, place, and time [Affect] : the affect was normal [Mood] : the mood was normal [Not Anxious] : not anxious [Normal Station and Gait] : the gait and station were normal for the patient's age [No Focal Deficits] : no focal deficits [No Palpable Adenopathy] : no palpable adenopathy

## 2022-04-01 LAB
CULTURE RESULTS: NO GROWTH — SIGNIFICANT CHANGE UP
SPECIMEN SOURCE: SIGNIFICANT CHANGE UP

## 2022-04-07 ENCOUNTER — APPOINTMENT (OUTPATIENT)
Dept: UROLOGY | Facility: CLINIC | Age: 52
End: 2022-04-07
Payer: COMMERCIAL

## 2022-04-07 ENCOUNTER — LABORATORY RESULT (OUTPATIENT)
Age: 52
End: 2022-04-07

## 2022-04-07 PROCEDURE — 99214 OFFICE O/P EST MOD 30 MIN: CPT

## 2022-04-07 PROCEDURE — 76775 US EXAM ABDO BACK WALL LIM: CPT

## 2022-04-07 NOTE — ASSESSMENT
[FreeTextEntry1] : sono images reviewed \par will schedule urs \par risk of bleeding infection damage to ureter \par postop stent reviewed\par

## 2022-04-07 NOTE — HISTORY OF PRESENT ILLNESS
[FreeTextEntry1] : - Chief Complaint: hosp f/u \par - HPI Objective Statement: 50 y/o female with history of DM, gout, kidney\par stones, p/w pain from right lower back to groin starting this morning with some\par vomiting and burning with urination and no other associated symptoms. Patient\par states that pain does not feel similar to kidney stone pain and burning with\par urination does not feel like past UTI. Patient took pain meds, Tylenol and\par Codeine around 7PM, but did not provide relief. Patient denies any history of\par abdominal surgeries or recent antibiotics. Patient denies fever, chest pain,\par shortness of breath, cough, diarrhea, focal numbness/weakness, syncope, or any\par other recent illnesses and hospitalizations.\par - Presenting Symptoms: PAIN, VOMITING, burning with urination\par - Negative Findings: no diarrhea, no fever\par - Location: right lower back\par - Radiation: groin\par - Timing: unknown\par - Duration: today\par - Context: unknown\par ct\par Delayed nephrogram and perinephric stranding and mild right hydronephrosis and hydroureter secondary to a 3 mm obstructing stone at the distal right ureter.\par \par \par now has pain on the left

## 2022-04-08 ENCOUNTER — OUTPATIENT (OUTPATIENT)
Dept: OUTPATIENT SERVICES | Facility: HOSPITAL | Age: 52
LOS: 1 days | End: 2022-04-08
Payer: COMMERCIAL

## 2022-04-08 VITALS
HEIGHT: 62 IN | RESPIRATION RATE: 16 BRPM | TEMPERATURE: 98 F | SYSTOLIC BLOOD PRESSURE: 135 MMHG | DIASTOLIC BLOOD PRESSURE: 84 MMHG | HEART RATE: 90 BPM | OXYGEN SATURATION: 98 % | WEIGHT: 149.91 LBS

## 2022-04-08 DIAGNOSIS — N20.9 URINARY CALCULUS, UNSPECIFIED: ICD-10-CM

## 2022-04-08 DIAGNOSIS — E11.9 TYPE 2 DIABETES MELLITUS WITHOUT COMPLICATIONS: ICD-10-CM

## 2022-04-08 DIAGNOSIS — Z29.9 ENCOUNTER FOR PROPHYLACTIC MEASURES, UNSPECIFIED: ICD-10-CM

## 2022-04-08 DIAGNOSIS — Z01.818 ENCOUNTER FOR OTHER PREPROCEDURAL EXAMINATION: ICD-10-CM

## 2022-04-08 DIAGNOSIS — M10.9 GOUT, UNSPECIFIED: ICD-10-CM

## 2022-04-08 DIAGNOSIS — E78.5 HYPERLIPIDEMIA, UNSPECIFIED: ICD-10-CM

## 2022-04-08 LAB
ALBUMIN SERPL ELPH-MCNC: 4.1 G/DL
ALP BLD-CCNC: 116 U/L
ALT SERPL-CCNC: 15 U/L
ANION GAP SERPL CALC-SCNC: 19 MMOL/L
APPEARANCE UR: CLEAR — SIGNIFICANT CHANGE UP
APTT BLD: 34.3 SEC
AST SERPL-CCNC: 13 U/L
BACTERIA # UR AUTO: ABNORMAL /HPF
BASOPHILS # BLD AUTO: 0.06 K/UL
BASOPHILS NFR BLD AUTO: 0.7 %
BILIRUB SERPL-MCNC: 0.4 MG/DL
BILIRUB UR-MCNC: NEGATIVE — SIGNIFICANT CHANGE UP
BUN SERPL-MCNC: 19 MG/DL
CALCIUM SERPL-MCNC: 9.2 MG/DL
CHLORIDE SERPL-SCNC: 101 MMOL/L
CO2 SERPL-SCNC: 19 MMOL/L
COLOR SPEC: YELLOW — SIGNIFICANT CHANGE UP
COMMENT - URINE: SIGNIFICANT CHANGE UP
CREAT SERPL-MCNC: 0.87 MG/DL
DIFF PNL FLD: ABNORMAL
EGFR: 80 ML/MIN/1.73M2
EOSINOPHIL # BLD AUTO: 0.28 K/UL
EOSINOPHIL NFR BLD AUTO: 3.5 %
EPI CELLS # UR: SIGNIFICANT CHANGE UP /HPF
ESTIMATED AVERAGE GLUCOSE: 237 MG/DL
GLUCOSE SERPL-MCNC: 304 MG/DL
GLUCOSE UR QL: 250
HBA1C MFR BLD HPLC: 9.9 %
HCT VFR BLD CALC: 39.5 %
HGB BLD-MCNC: 12.3 G/DL
IMM GRANULOCYTES NFR BLD AUTO: 0.2 %
INR PPP: 0.92 RATIO
KETONES UR-MCNC: NEGATIVE — SIGNIFICANT CHANGE UP
LEUKOCYTE ESTERASE UR-ACNC: ABNORMAL
LYMPHOCYTES # BLD AUTO: 2.89 K/UL
LYMPHOCYTES NFR BLD AUTO: 36.1 %
MAN DIFF?: NORMAL
MCHC RBC-ENTMCNC: 28.1 PG
MCHC RBC-ENTMCNC: 31.1 GM/DL
MCV RBC AUTO: 90.4 FL
MONOCYTES # BLD AUTO: 0.32 K/UL
MONOCYTES NFR BLD AUTO: 4 %
NEUTROPHILS # BLD AUTO: 4.44 K/UL
NEUTROPHILS NFR BLD AUTO: 55.5 %
NITRITE UR-MCNC: NEGATIVE — SIGNIFICANT CHANGE UP
PH UR: 5 — SIGNIFICANT CHANGE UP (ref 5–8)
PLATELET # BLD AUTO: 344 K/UL
POTASSIUM SERPL-SCNC: 4.4 MMOL/L
PROT SERPL-MCNC: 7.5 G/DL
PROT UR-MCNC: 30 MG/DL
PT BLD: 10.7 SEC
RBC # BLD: 4.37 M/UL
RBC # FLD: 12.7 %
RBC CASTS # UR COMP ASSIST: ABNORMAL /HPF (ref 0–2)
SARS-COV-2 RNA SPEC QL NAA+PROBE: SIGNIFICANT CHANGE UP
SODIUM SERPL-SCNC: 140 MMOL/L
SP GR SPEC: 1.02 — SIGNIFICANT CHANGE UP (ref 1.01–1.02)
URATE CRY FLD QL MICRO: ABNORMAL /HPF
UROBILINOGEN FLD QL: NEGATIVE — SIGNIFICANT CHANGE UP
WBC # FLD AUTO: 8.01 K/UL
WBC UR QL: SIGNIFICANT CHANGE UP /HPF (ref 0–5)

## 2022-04-08 PROCEDURE — G0463: CPT

## 2022-04-08 RX ORDER — ALLOPURINOL 300 MG
1 TABLET ORAL
Qty: 0 | Refills: 0 | DISCHARGE

## 2022-04-08 RX ORDER — ROSUVASTATIN CALCIUM 5 MG/1
1 TABLET ORAL
Qty: 0 | Refills: 0 | DISCHARGE

## 2022-04-08 RX ORDER — SODIUM CHLORIDE 9 MG/ML
3 INJECTION INTRAMUSCULAR; INTRAVENOUS; SUBCUTANEOUS EVERY 8 HOURS
Refills: 0 | Status: DISCONTINUED | OUTPATIENT
Start: 2022-04-11 | End: 2022-04-18

## 2022-04-08 RX ORDER — GLIMEPIRIDE 1 MG
1 TABLET ORAL
Qty: 0 | Refills: 0 | DISCHARGE

## 2022-04-08 RX ORDER — DAPAGLIFLOZIN AND METFORMIN HYDROCHLORIDE 10; 1000 MG/1; MG/1
1 TABLET, FILM COATED, EXTENDED RELEASE ORAL
Qty: 0 | Refills: 0 | DISCHARGE

## 2022-04-08 NOTE — H&P PST ADULT - NSANTHOSAYNRD_GEN_A_CORE
No. MILES screening performed.  STOP BANG Legend: 0-2 = LOW Risk; 3-4 = INTERMEDIATE Risk; 5-8 = HIGH Risk

## 2022-04-08 NOTE — H&P PST ADULT - NSICDXFAMILYHX_GEN_ALL_CORE_FT
FAMILY HISTORY:  Family history of gout    Father  Still living? Unknown  FH: type 2 diabetes, Age at diagnosis: Age Unknown

## 2022-04-08 NOTE — H&P PST ADULT - ASSESSMENT
52 yo female is scheduled for : Cystoscopy  Left Ureteroscopy with Laser Lithotripsy and Stent Placement, on 4/11/22

## 2022-04-08 NOTE — H&P PST ADULT - PROBLEM SELECTOR PLAN 1
Cystoscopy  Left Ureteroscopy with Laser Lithotripsy and Stent Placement      STOP BANG : Cystoscopy  Left Ureteroscopy with Laser Lithotripsy and Stent Placement      STOP BANG : 2  Low risk for MILES complications

## 2022-04-08 NOTE — H&P PST ADULT - PROBLEM SELECTOR PLAN 2
Patient states she does not take medication any more  She watch her diet  F/U with PCP for lipid monitoring

## 2022-04-08 NOTE — H&P PST ADULT - HISTORY OF PRESENT ILLNESS
50 yo female reports the above.  She is scheduled for : Cystoscopy  Left Ureteroscopy with Laser Lithotripsy and Stent Placement, on 4/11/22 50 yo female reports the above.  S/P ER for pain in the right lower back on 3/30/22, then this week, tuesday, started having pain on the left back. Cat scan revealed three stones in the left kidney.   She is scheduled for : Cystoscopy  Left Ureteroscopy with Laser Lithotripsy and Stent Placement, on 4/11/22

## 2022-04-08 NOTE — H&P PST ADULT - NEGATIVE ALLERGY TYPES
Patent
alert
no outdoor environmental allergies/no indoor environmental allergies/no reactions to medicines/no reactions to food/no reactions to animals/no reactions to insect bites

## 2022-04-10 ENCOUNTER — TRANSCRIPTION ENCOUNTER (OUTPATIENT)
Age: 52
End: 2022-04-10

## 2022-04-10 LAB
CULTURE RESULTS: SIGNIFICANT CHANGE UP
SPECIMEN SOURCE: SIGNIFICANT CHANGE UP

## 2022-04-11 ENCOUNTER — TRANSCRIPTION ENCOUNTER (OUTPATIENT)
Age: 52
End: 2022-04-11

## 2022-04-11 ENCOUNTER — RESULT REVIEW (OUTPATIENT)
Age: 52
End: 2022-04-11

## 2022-04-11 ENCOUNTER — APPOINTMENT (OUTPATIENT)
Dept: UROLOGY | Facility: HOSPITAL | Age: 52
End: 2022-04-11

## 2022-04-11 ENCOUNTER — OUTPATIENT (OUTPATIENT)
Dept: OUTPATIENT SERVICES | Facility: HOSPITAL | Age: 52
LOS: 1 days | End: 2022-04-11
Payer: COMMERCIAL

## 2022-04-11 VITALS
HEIGHT: 62 IN | WEIGHT: 149.91 LBS | SYSTOLIC BLOOD PRESSURE: 153 MMHG | HEART RATE: 88 BPM | RESPIRATION RATE: 16 BRPM | OXYGEN SATURATION: 100 % | TEMPERATURE: 98 F | DIASTOLIC BLOOD PRESSURE: 86 MMHG

## 2022-04-11 VITALS
DIASTOLIC BLOOD PRESSURE: 82 MMHG | TEMPERATURE: 97 F | OXYGEN SATURATION: 99 % | HEART RATE: 79 BPM | SYSTOLIC BLOOD PRESSURE: 140 MMHG | RESPIRATION RATE: 16 BRPM

## 2022-04-11 DIAGNOSIS — Z01.818 ENCOUNTER FOR OTHER PREPROCEDURAL EXAMINATION: ICD-10-CM

## 2022-04-11 DIAGNOSIS — N20.9 URINARY CALCULUS, UNSPECIFIED: ICD-10-CM

## 2022-04-11 LAB — BACTERIA UR CULT: NORMAL

## 2022-04-11 PROCEDURE — 74420 UROGRAPHY RTRGR +-KUB: CPT | Mod: 26,LT

## 2022-04-11 PROCEDURE — 52356 CYSTO/URETERO W/LITHOTRIPSY: CPT | Mod: LT

## 2022-04-11 PROCEDURE — 88300 SURGICAL PATH GROSS: CPT | Mod: 26

## 2022-04-11 DEVICE — STENT URET PERCFLX PLUS 6F 2MM 24CM
Type: IMPLANTABLE DEVICE | Site: CYSTOSCOPY LEFT URETEROSCOPY LEFT RETROGRADE PYELOGRAM, INSERTION OF STENTS | Status: FUNCTIONAL

## 2022-04-11 RX ORDER — ACETAMINOPHEN 500 MG
650 TABLET ORAL ONCE
Refills: 0 | Status: DISCONTINUED | OUTPATIENT
Start: 2022-04-11 | End: 2022-04-18

## 2022-04-11 RX ORDER — TRAMADOL HYDROCHLORIDE 50 MG/1
25 TABLET ORAL ONCE
Refills: 0 | Status: DISCONTINUED | OUTPATIENT
Start: 2022-04-11 | End: 2022-04-11

## 2022-04-11 RX ORDER — PHENAZOPYRIDINE HCL 100 MG
1 TABLET ORAL
Qty: 9 | Refills: 0
Start: 2022-04-11 | End: 2022-04-13

## 2022-04-11 RX ORDER — CHOLECALCIFEROL (VITAMIN D3) 125 MCG
1 CAPSULE ORAL
Qty: 0 | Refills: 0 | DISCHARGE

## 2022-04-11 RX ORDER — ASCORBIC ACID 60 MG
1 TABLET,CHEWABLE ORAL
Qty: 0 | Refills: 0 | DISCHARGE

## 2022-04-11 RX ORDER — TAMSULOSIN HYDROCHLORIDE 0.4 MG/1
1 CAPSULE ORAL
Qty: 0 | Refills: 0 | DISCHARGE

## 2022-04-11 RX ORDER — FENTANYL CITRATE 50 UG/ML
25 INJECTION INTRAVENOUS
Refills: 0 | Status: DISCONTINUED | OUTPATIENT
Start: 2022-04-11 | End: 2022-04-11

## 2022-04-11 RX ORDER — INSULIN GLARGINE 100 [IU]/ML
15 INJECTION, SOLUTION SUBCUTANEOUS
Qty: 0 | Refills: 0 | DISCHARGE

## 2022-04-11 RX ORDER — FENTANYL CITRATE 50 UG/ML
50 INJECTION INTRAVENOUS
Refills: 0 | Status: DISCONTINUED | OUTPATIENT
Start: 2022-04-11 | End: 2022-04-11

## 2022-04-11 RX ADMIN — TRAMADOL HYDROCHLORIDE 25 MILLIGRAM(S): 50 TABLET ORAL at 12:02

## 2022-04-11 NOTE — ASSESSMENT
[FreeTextEntry1] : trial of met \par pain control \par to er for fever worsening pain \par f/u sono

## 2022-04-11 NOTE — ASU DISCHARGE PLAN (ADULT/PEDIATRIC) - CARE PROVIDER_API CALL
Gorge Redd)  Urology  [address] [facility], [address]  [place] [address]  Phone: [phone]  Fax: [phone]  Follow Up Time: 2 weeks

## 2022-04-11 NOTE — BRIEF OPERATIVE NOTE - NSICDXBRIEFPROCEDURE_GEN_ALL_CORE_FT
PROCEDURES:  Cystoscopy with left ureteroscopy and left-sided laser lithotripsy procedure 11-Apr-2022 09:37:43  Tasneem Lee

## 2022-04-11 NOTE — ASU DISCHARGE PLAN (ADULT/PEDIATRIC) - NS MD DC FALL RISK RISK
For information on Fall & Injury Prevention, visit: [email or web address] OR  [email or web address] OR  https://www.cdc.gov/steadi/patient.html

## 2022-04-11 NOTE — ASU DISCHARGE PLAN (ADULT/PEDIATRIC) - ASU DC SPECIAL INSTRUCTIONSFT
Please follow-up with your surgeon in 1 week. Drink plenty of fluids and rest as needed. Call for any fever over 101, nausea, vomiting, severe pain, no passing of gas or bowel movement.     DIET   You may resume your regular diet as normal.     SURGICAL SITES   Remove outer dressing and keep white steri-strips in place allowing them to fall off on their own. You may shower 48 hours post-operatively but do not bathe or soak in the water for 1-2 weeks; pat dry. If you notice any signs of surgical site infection (ie. redness, swelling, pain, pus drainage), please seek medical care immediately.     ACTIVITY Do not lift anything heavier than 10 pounds for 2 weeks and avoid strenuous activity for 4-6 weeks.     PAIN CONTROL   You may take Motrin 600mg (with food) or Tylenol 650mg as needed for mild pain. Stagger one medication 3 hours after the other for maximum pain control. Maximum daily dose of Tylenol should not exceed 4grams/day.

## 2022-04-11 NOTE — HISTORY OF PRESENT ILLNESS
[FreeTextEntry1] : - Chief Complaint: hosp f/u \par - HPI Objective Statement: 50 y/o female with history of DM, gout, kidney\par stones, p/w pain from right lower back to groin starting this morning with some\par vomiting and burning with urination and no other associated symptoms. Patient\par states that pain does not feel similar to kidney stone pain and burning with\par urination does not feel like past UTI. Patient took pain meds, Tylenol and\par Codeine around 7PM, but did not provide relief. Patient denies any history of\par abdominal surgeries or recent antibiotics. Patient denies fever, chest pain,\par shortness of breath, cough, diarrhea, focal numbness/weakness, syncope, or any\par other recent illnesses and hospitalizations.\par - Presenting Symptoms: PAIN, VOMITING, burning with urination\par - Negative Findings: no diarrhea, no fever\par - Location: right lower back\par - Radiation: groin\par - Timing: unknown\par - Duration: today\par - Context: unknown\par ct\par Delayed nephrogram and perinephric stranding and mild right hydronephrosis and hydroureter secondary to a 3 mm obstructing stone at the distal right ureter.\par

## 2022-04-12 PROCEDURE — C1889: CPT

## 2022-04-12 PROCEDURE — 88300 SURGICAL PATH GROSS: CPT

## 2022-04-12 PROCEDURE — C1758: CPT

## 2022-04-12 PROCEDURE — 76000 FLUOROSCOPY <1 HR PHYS/QHP: CPT

## 2022-04-12 PROCEDURE — C1769: CPT

## 2022-04-12 PROCEDURE — 82962 GLUCOSE BLOOD TEST: CPT

## 2022-04-12 PROCEDURE — 52332 CYSTOSCOPY AND TREATMENT: CPT | Mod: LT

## 2022-04-12 PROCEDURE — 82365 CALCULUS SPECTROSCOPY: CPT

## 2022-04-12 PROCEDURE — C2617: CPT

## 2022-04-19 NOTE — ED PROVIDER NOTE - CONSTITUTIONAL, MLM
normal... Well appearing, awake, alert, oriented to person, place, time/situation and in no apparent distress. normal

## 2022-04-20 ENCOUNTER — APPOINTMENT (OUTPATIENT)
Dept: UROLOGY | Facility: CLINIC | Age: 52
End: 2022-04-20
Payer: COMMERCIAL

## 2022-04-20 PROCEDURE — 52310 CYSTOSCOPY AND TREATMENT: CPT

## 2022-05-13 ENCOUNTER — APPOINTMENT (OUTPATIENT)
Dept: OBGYN | Facility: CLINIC | Age: 52
End: 2022-05-13

## 2022-06-22 ENCOUNTER — APPOINTMENT (OUTPATIENT)
Dept: UROLOGY | Facility: CLINIC | Age: 52
End: 2022-06-22

## 2022-08-09 ENCOUNTER — NON-APPOINTMENT (OUTPATIENT)
Age: 52
End: 2022-08-09

## 2022-08-10 ENCOUNTER — APPOINTMENT (OUTPATIENT)
Dept: UROLOGY | Facility: CLINIC | Age: 52
End: 2022-08-10

## 2022-08-10 VITALS
TEMPERATURE: 97.3 F | OXYGEN SATURATION: 99 % | WEIGHT: 146 LBS | DIASTOLIC BLOOD PRESSURE: 80 MMHG | HEIGHT: 64 IN | SYSTOLIC BLOOD PRESSURE: 126 MMHG | HEART RATE: 98 BPM | BODY MASS INDEX: 24.92 KG/M2

## 2022-08-10 PROCEDURE — 99214 OFFICE O/P EST MOD 30 MIN: CPT

## 2022-08-19 LAB — BACTERIA UR CULT: ABNORMAL

## 2022-08-19 RX ORDER — CEFUROXIME AXETIL 500 MG/1
500 TABLET ORAL
Qty: 14 | Refills: 0 | Status: ACTIVE | COMMUNITY
Start: 2022-08-19 | End: 1900-01-01

## 2022-08-19 NOTE — HISTORY OF PRESENT ILLNESS
[FreeTextEntry1] : cc right flank pain \par p atient reports lower right-sided flank pain. Tylenol does not relieve pain. History of ureteral stone. S/P Cystoscopy, left ureteroscopy laser lithotripsy, retrograde pyelogram, stent insertion on 4/11/22. Cystoscopy with stent removal on 4/20/22\par no fever \par no dysuria

## 2022-09-08 ENCOUNTER — APPOINTMENT (OUTPATIENT)
Dept: UROLOGY | Facility: CLINIC | Age: 52
End: 2022-09-08

## 2022-09-08 PROCEDURE — 99213 OFFICE O/P EST LOW 20 MIN: CPT | Mod: 95

## 2022-10-03 NOTE — HISTORY OF PRESENT ILLNESS
[Home] : at home, [unfilled] , at the time of the visit. [Medical Office: ([facility])___] : at the medical office located in  [FreeTextEntry1] : cc f/u \par p atient reports lower right-sided flank pain. Tylenol does not relieve pain. History of ureteral stone. S/P Cystoscopy, left ureteroscopy laser lithotripsy, retrograde pyelogram, stent insertion on 4/11/22. Cystoscopy with stent removal on 4/20/22\par no fever \par no dysuria\par \par ct small nonobstructive punctate stones up tp 0.2 cm

## 2022-10-03 NOTE — ASSESSMENT
[FreeTextEntry1] : ct images reviewed \par will observe small nonobstructive stones\par   \par Recommended Kidney stone prevention diet:\par - Good oral hydration so that urine is clear to light yellow, usually 1.5 to 2 Liters of fluids, mainly water\par - Less red meat\par - Less salt\par - Limit foods with oxalate like- dark green vegetables, rhubarb, chocolate, wheat bran, nuts, cranberries, and beans\par - Increase citrate in diet by consuming citrus fruits and juices. Discussed that linsey and limes have the most citric acid, oranges, grapefruits, and berries also contain appreciable amounts.\par

## 2022-12-21 NOTE — ED PROVIDER NOTE - CHILD ABUSE FACILITY
H Cheek To Nose Interpolation Flap Division And Inset Text: Division and inset of the cheek to nose interpolation flap was performed to achieve optimal aesthetic result, restore normal anatomic appearance and avoid distortion of normal anatomy, expedite and facilitate wound healing, achieve optimal functional result and because linear closure either not possible or would produce suboptimal result. The patient was prepped and draped in the usual manner. The pedicle was infiltrated with local anesthesia. The pedicle was sectioned with a #15 blade. The pedicle was de-bulked and trimmed to match the shape of the defect. Hemostasis was achieved. The flap donor site and free margin of the flap were secured with deep buried sutures and the wound edges were re-approximated.

## 2023-03-03 NOTE — ED PROVIDER NOTE - HIV OFFER
Opt out Eye Protection Verbiage: Before proceeding with the stage, a plastic scleral shield was inserted. The globe was anesthetized with a few drops of 1% lidocaine with 1:100,000 epinephrine. Then, an appropriate sized scleral shield was chosen and coated with lacrilube ointment. The shield was gently inserted and left in place for the duration of each stage. After the stage was completed, the shield was gently removed.

## 2023-05-25 ENCOUNTER — LABORATORY RESULT (OUTPATIENT)
Age: 53
End: 2023-05-25

## 2023-05-25 ENCOUNTER — ASOB RESULT (OUTPATIENT)
Age: 53
End: 2023-05-25

## 2023-05-25 ENCOUNTER — APPOINTMENT (OUTPATIENT)
Dept: OBGYN | Facility: CLINIC | Age: 53
End: 2023-05-25
Payer: COMMERCIAL

## 2023-05-25 VITALS — DIASTOLIC BLOOD PRESSURE: 86 MMHG | WEIGHT: 158 LBS | BODY MASS INDEX: 27.12 KG/M2 | SYSTOLIC BLOOD PRESSURE: 146 MMHG

## 2023-05-25 DIAGNOSIS — D25.9 LEIOMYOMA OF UTERUS, UNSPECIFIED: ICD-10-CM

## 2023-05-25 DIAGNOSIS — Z01.419 ENCOUNTER FOR GYNECOLOGICAL EXAMINATION (GENERAL) (ROUTINE) W/OUT ABNORMAL FINDINGS: ICD-10-CM

## 2023-05-25 PROCEDURE — 99212 OFFICE O/P EST SF 10 MIN: CPT | Mod: 25

## 2023-05-25 PROCEDURE — 76830 TRANSVAGINAL US NON-OB: CPT

## 2023-05-25 PROCEDURE — 99396 PREV VISIT EST AGE 40-64: CPT

## 2023-05-26 PROBLEM — Z01.419 WELL WOMAN EXAM: Status: ACTIVE | Noted: 2018-03-22

## 2023-05-26 PROBLEM — D25.9 FIBROID UTERUS: Status: ACTIVE | Noted: 2023-05-26

## 2023-05-26 NOTE — HISTORY OF PRESENT ILLNESS
[FreeTextEntry1] : pt comes for pap . She feels good no complaints . She is concerned about the fibroid . IShe has no complaints .

## 2023-09-28 NOTE — CONSULT NOTE ADULT - SUBJECTIVE AND OBJECTIVE BOX
No care due was identified.  [facility] Embedded Care Due Messages. Reference number: [id number].   9/28/2023 7:39:06 AM CDT   CHIEF COMPLAINT:Patient is a 48y old  Female who presents with a chief complaint of chest pain.      HPI:  Pt is a 49 y/o F with a PMHx of DM, Gout, Htn, renal stones kidney stones and PSHx of  presents to ED c/o intermittent chest pain x 4 days.Pt describes pain as dull, pressure like, 7/10 intensity, no radiation. Pt denies any previous history od similar pain, sob, palpitation, burning sensation, cough or leg swelling. Pt also has h/o diabetes currently on oral medications, was recently started on blood pressure medications by PCP that were later discontinued. Pt was also diagnosed with Gout 1 year ago and was started on allopurinol. Pt has one episiode of renal colic in march and was found to have UPJ stone 2 mm on left side with hydroureter. Pt recently had URTI and sinusitus and was started on antibiotics on  for 10 days. Pt denies any sinus pain or headache for now. (2019 20:42)      PAST MEDICAL & SURGICAL HISTORY:  Gout  Kidney stone  Diabetes   Section  HTN      MEDICATIONS  (STANDING):  allopurinol 300 milliGRAM(s) Oral daily  aspirin  chewable 81 milliGRAM(s) Oral daily  atorvastatin 40 milliGRAM(s) Oral at bedtime  colchicine 0.6 milliGRAM(s) Oral two times a day  metoprolol tartrate 25 milliGRAM(s) Oral two times a day  nitroglycerin    2% Ointment 0.5 Inch(s) Transdermal daily  tamsulosin 0.4 milliGRAM(s) Oral at bedtime    MEDICATIONS  (PRN):      FAMILY HISTORY:  Family history of gout  Dad-CAD- in his 70's      SOCIAL HISTORY:    [x ] Non-smoker    [x ] Alcohol-social    Allergies    No Known Allergies    Intolerances    	    REVIEW OF SYSTEMS:  CONSTITUTIONAL: No fever, weight loss, or fatigue  EYES: No eye pain, visual disturbances, or discharge  ENT:  No difficulty hearing, tinnitus, vertigo; No sinus or throat pain  NECK: No pain or stiffness  RESPIRATORY: No cough, wheezing, chills or hemoptysis; No Shortness of Breath  CARDIOVASCULAR: + chest pain, No palpitations, passing out, dizziness, or leg swelling  GASTROINTESTINAL: No abdominal or epigastric pain. No nausea, vomiting, or hematemesis; No diarrhea or constipation. No melena or hematochezia.  GENITOURINARY: No dysuria, frequency, hematuria, or incontinence  NEUROLOGICAL: No headaches, memory loss, loss of strength, numbness, or tremors  SKIN: No itching, burning, rashes, or lesions   LYMPH Nodes: No enlarged glands  ENDOCRINE: No heat or cold intolerance; No hair loss  MUSCULOSKELETAL: No joint pain or swelling; No muscle, back, or extremity pain  PSYCHIATRIC: No depression, anxiety, mood swings, or difficulty sleeping  HEME/LYMPH: No easy bruising, or bleeding gums  ALLERGY AND IMMUNOLOGIC: No hives or eczema	      PHYSICAL EXAM:  T(C): 37 (19 @ 07:25), Max: 37 (19 @ 07:25)  HR: 73 (19 @ 07:25) (72 - 88)  BP: 101/54 (19 @ 07:25) (101/54 - 153/91)  RR: 16 (19 @ 07:25) (16 - 19)  SpO2: 98% (19 @ 07:25) (98% - 100%)      Appearance: Normal	  HEENT:   Normal oral mucosa, PERRL, EOMI	  Lymphatic: No lymphadenopathy  Cardiovascular: Normal S1 S2, No JVD, No murmurs, No edema  Respiratory: Lungs clear to auscultation	  Psychiatry: A & O x 3, Mood & affect appropriate  Gastrointestinal:  Soft, Non-tender, + BS	  Skin: No rashes, No ecchymoses, No cyanosis	  Neurologic: Non-focal  Extremities: Normal range of motion, No clubbing, cyanosis or edema  Vascular: Peripheral pulses palpable 2+ bilaterally    	    ECG:  	nsr with pac's    	  LABS:	 	    CARDIAC MARKERS:  CARDIAC MARKERS ( 2019 01:47 )  <0.015 ng/mL / x     / 66 U/L / x     / <1.0 ng/mL  CARDIAC MARKERS ( 2019 18:17 )  <0.015 ng/mL / x     / 66 U/L / x     / <1.0 ng/mL                              11.2   9.20  )-----------( 331      ( 2019 18:17 )             35.7     04-    141  |  107  |  25<H>  ----------------------------<  136<H>  4.3   |  28  |  1.03    Ca    8.5      2019 18:17          EXAM:  XR CHEST PA LAT 2V                            PROCEDURE DATE:  2019          INTERPRETATION:  INDICATION: chest pain    PRIORS: 2011    VIEWS: Frontal and lateral radiographs of the chest performed.    FINDINGS: Heart size appears within normal limits. No hilar or superior   mediastinal abnormalities are identified.    There is no evidence for focal infiltrate, lobar consolidation or   pulmonary edema. No pleural effusion or pneumothorax is demonstrated. No   mediastinal shift is noted. The visualized osseous structures appear   unremarkable.    IMPRESSION: No evidence for focal infiltrate or lobar consolidation.

## 2023-11-15 ENCOUNTER — APPOINTMENT (OUTPATIENT)
Dept: UROLOGY | Facility: CLINIC | Age: 53
End: 2023-11-15
Payer: COMMERCIAL

## 2023-11-15 VITALS
SYSTOLIC BLOOD PRESSURE: 150 MMHG | DIASTOLIC BLOOD PRESSURE: 87 MMHG | HEART RATE: 84 BPM | TEMPERATURE: 97.2 F | WEIGHT: 158 LBS | RESPIRATION RATE: 16 BRPM | OXYGEN SATURATION: 99 % | HEIGHT: 64 IN | BODY MASS INDEX: 26.98 KG/M2

## 2023-11-15 DIAGNOSIS — N20.9 URINARY CALCULUS, UNSPECIFIED: ICD-10-CM

## 2023-11-15 PROCEDURE — 99214 OFFICE O/P EST MOD 30 MIN: CPT

## 2023-11-15 PROCEDURE — 76775 US EXAM ABDO BACK WALL LIM: CPT

## 2023-11-15 RX ORDER — IBUPROFEN 800 MG/1
800 TABLET, FILM COATED ORAL 3 TIMES DAILY
Qty: 21 | Refills: 0 | Status: ACTIVE | COMMUNITY
Start: 2023-11-15 | End: 1900-01-01

## 2023-11-15 RX ORDER — TAMSULOSIN HYDROCHLORIDE 0.4 MG/1
0.4 CAPSULE ORAL
Qty: 30 | Refills: 0 | Status: ACTIVE | COMMUNITY
Start: 2023-11-15 | End: 1900-01-01

## 2023-11-15 RX ORDER — OXYCODONE AND ACETAMINOPHEN 5; 325 MG/1; MG/1
5-325 TABLET ORAL
Qty: 12 | Refills: 0 | Status: ACTIVE | COMMUNITY
Start: 2023-11-15 | End: 1900-01-01

## 2023-11-16 LAB
APPEARANCE: CLEAR
BACTERIA: ABNORMAL /HPF
BILIRUBIN URINE: NEGATIVE
BLOOD URINE: ABNORMAL
CAST: 0 /LPF
COLOR: YELLOW
EPITHELIAL CELLS: 6 /HPF
GLUCOSE QUALITATIVE U: 500 MG/DL
KETONES URINE: NEGATIVE MG/DL
LEUKOCYTE ESTERASE URINE: NEGATIVE
MICROSCOPIC-UA: NORMAL
NITRITE URINE: NEGATIVE
PH URINE: 6.5
PROTEIN URINE: 30 MG/DL
RED BLOOD CELLS URINE: 4 /HPF
SPECIFIC GRAVITY URINE: 1.02
UROBILINOGEN URINE: 0.2 MG/DL
WHITE BLOOD CELLS URINE: 4 /HPF

## 2023-11-22 LAB — BACTERIA UR CULT: NORMAL

## 2024-02-29 NOTE — ED ADULT TRIAGE NOTE - MEANS OF ARRIVAL
Per CNP, all these vitamin doses are fine she would keep them the same for now. Pt aware she can start her Anastrozole on Monday or Tuesday depending on how she is feeling after finishing radiation. I gave her the IT help number for MyChart questions. No further issues.   ambulatory

## 2024-10-18 ENCOUNTER — APPOINTMENT (OUTPATIENT)
Dept: ULTRASOUND IMAGING | Facility: IMAGING CENTER | Age: 54
End: 2024-10-18
Payer: COMMERCIAL

## 2024-10-18 ENCOUNTER — APPOINTMENT (OUTPATIENT)
Dept: RADIOLOGY | Facility: IMAGING CENTER | Age: 54
End: 2024-10-18

## 2024-10-18 ENCOUNTER — OUTPATIENT (OUTPATIENT)
Dept: OUTPATIENT SERVICES | Facility: HOSPITAL | Age: 54
LOS: 1 days | End: 2024-10-18
Payer: COMMERCIAL

## 2024-10-18 ENCOUNTER — APPOINTMENT (OUTPATIENT)
Dept: MAMMOGRAPHY | Facility: IMAGING CENTER | Age: 54
End: 2024-10-18
Payer: COMMERCIAL

## 2024-10-18 DIAGNOSIS — Z00.8 ENCOUNTER FOR OTHER GENERAL EXAMINATION: ICD-10-CM

## 2024-10-18 PROCEDURE — 76641 ULTRASOUND BREAST COMPLETE: CPT

## 2024-10-18 PROCEDURE — G0279: CPT | Mod: 26

## 2024-10-18 PROCEDURE — 77080 DXA BONE DENSITY AXIAL: CPT

## 2024-10-18 PROCEDURE — 77066 DX MAMMO INCL CAD BI: CPT | Mod: 26

## 2024-10-18 PROCEDURE — 77066 DX MAMMO INCL CAD BI: CPT

## 2024-10-18 PROCEDURE — G0279: CPT

## 2024-10-18 PROCEDURE — 77080 DXA BONE DENSITY AXIAL: CPT | Mod: 26

## 2024-10-18 PROCEDURE — 76641 ULTRASOUND BREAST COMPLETE: CPT | Mod: 26,50

## 2024-10-31 ENCOUNTER — APPOINTMENT (OUTPATIENT)
Dept: ULTRASOUND IMAGING | Facility: IMAGING CENTER | Age: 54
End: 2024-10-31
Payer: COMMERCIAL

## 2024-10-31 ENCOUNTER — OUTPATIENT (OUTPATIENT)
Dept: OUTPATIENT SERVICES | Facility: HOSPITAL | Age: 54
LOS: 1 days | End: 2024-10-31
Payer: COMMERCIAL

## 2024-10-31 DIAGNOSIS — Z00.8 ENCOUNTER FOR OTHER GENERAL EXAMINATION: ICD-10-CM

## 2024-10-31 PROCEDURE — 77065 DX MAMMO INCL CAD UNI: CPT | Mod: 26,RT

## 2024-10-31 PROCEDURE — 19083 BX BREAST 1ST LESION US IMAG: CPT

## 2024-10-31 PROCEDURE — 19083 BX BREAST 1ST LESION US IMAG: CPT | Mod: RT

## 2024-10-31 PROCEDURE — 77065 DX MAMMO INCL CAD UNI: CPT

## 2024-10-31 PROCEDURE — 88360 TUMOR IMMUNOHISTOCHEM/MANUAL: CPT | Mod: 26

## 2024-10-31 PROCEDURE — 88305 TISSUE EXAM BY PATHOLOGIST: CPT

## 2024-10-31 PROCEDURE — 88360 TUMOR IMMUNOHISTOCHEM/MANUAL: CPT

## 2024-10-31 PROCEDURE — 88377 M/PHMTRC ALYS ISHQUANT/SEMIQ: CPT | Mod: 26

## 2024-10-31 PROCEDURE — A4648: CPT

## 2024-10-31 PROCEDURE — 88377 M/PHMTRC ALYS ISHQUANT/SEMIQ: CPT

## 2024-10-31 PROCEDURE — 88305 TISSUE EXAM BY PATHOLOGIST: CPT | Mod: 26

## 2024-11-07 ENCOUNTER — NON-APPOINTMENT (OUTPATIENT)
Age: 54
End: 2024-11-07

## 2024-11-11 ENCOUNTER — APPOINTMENT (OUTPATIENT)
Dept: SURGICAL ONCOLOGY | Facility: CLINIC | Age: 54
End: 2024-11-11

## 2024-11-11 VITALS
HEART RATE: 96 BPM | SYSTOLIC BLOOD PRESSURE: 141 MMHG | HEIGHT: 64 IN | DIASTOLIC BLOOD PRESSURE: 84 MMHG | BODY MASS INDEX: 24.24 KG/M2 | WEIGHT: 142 LBS | OXYGEN SATURATION: 96 %

## 2024-11-11 DIAGNOSIS — C50.211 MALIGNANT NEOPLASM OF UPPER-INNER QUADRANT OF RIGHT FEMALE BREAST: ICD-10-CM

## 2024-11-11 DIAGNOSIS — Z17.0 MALIGNANT NEOPLASM OF UPPER-INNER QUADRANT OF RIGHT FEMALE BREAST: ICD-10-CM

## 2024-11-11 PROCEDURE — 99205 OFFICE O/P NEW HI 60 MIN: CPT

## 2024-11-13 ENCOUNTER — OUTPATIENT (OUTPATIENT)
Dept: OUTPATIENT SERVICES | Facility: HOSPITAL | Age: 54
LOS: 1 days | End: 2024-11-13
Payer: COMMERCIAL

## 2024-11-13 ENCOUNTER — APPOINTMENT (OUTPATIENT)
Dept: ULTRASOUND IMAGING | Facility: IMAGING CENTER | Age: 54
End: 2024-11-13
Payer: COMMERCIAL

## 2024-11-13 ENCOUNTER — NON-APPOINTMENT (OUTPATIENT)
Age: 54
End: 2024-11-13

## 2024-11-13 ENCOUNTER — RESULT REVIEW (OUTPATIENT)
Age: 54
End: 2024-11-13

## 2024-11-13 DIAGNOSIS — Z00.8 ENCOUNTER FOR OTHER GENERAL EXAMINATION: ICD-10-CM

## 2024-11-13 DIAGNOSIS — C50.211 MALIGNANT NEOPLASM OF UPPER-INNER QUADRANT OF RIGHT FEMALE BREAST: ICD-10-CM

## 2024-11-13 PROCEDURE — 76856 US EXAM PELVIC COMPLETE: CPT | Mod: 26

## 2024-11-13 PROCEDURE — 76856 US EXAM PELVIC COMPLETE: CPT

## 2024-11-13 PROCEDURE — 76830 TRANSVAGINAL US NON-OB: CPT

## 2024-11-13 PROCEDURE — 76830 TRANSVAGINAL US NON-OB: CPT | Mod: 26

## 2024-11-13 PROCEDURE — 76700 US EXAM ABDOM COMPLETE: CPT | Mod: 26

## 2024-11-13 PROCEDURE — 76700 US EXAM ABDOM COMPLETE: CPT

## 2024-11-14 ENCOUNTER — LABORATORY RESULT (OUTPATIENT)
Age: 54
End: 2024-11-14

## 2024-11-14 ENCOUNTER — APPOINTMENT (OUTPATIENT)
Dept: OBGYN | Facility: CLINIC | Age: 54
End: 2024-11-14
Payer: COMMERCIAL

## 2024-11-14 ENCOUNTER — NON-APPOINTMENT (OUTPATIENT)
Age: 54
End: 2024-11-14

## 2024-11-14 VITALS — DIASTOLIC BLOOD PRESSURE: 74 MMHG | WEIGHT: 142 LBS | SYSTOLIC BLOOD PRESSURE: 108 MMHG | BODY MASS INDEX: 24.37 KG/M2

## 2024-11-14 DIAGNOSIS — C50.919 MALIGNANT NEOPLASM OF UNSPECIFIED SITE OF UNSPECIFIED FEMALE BREAST: ICD-10-CM

## 2024-11-14 DIAGNOSIS — Z01.419 ENCOUNTER FOR GYNECOLOGICAL EXAMINATION (GENERAL) (ROUTINE) W/OUT ABNORMAL FINDINGS: ICD-10-CM

## 2024-11-14 PROCEDURE — 99396 PREV VISIT EST AGE 40-64: CPT

## 2024-11-15 ENCOUNTER — APPOINTMENT (OUTPATIENT)
Dept: RADIOLOGY | Facility: CLINIC | Age: 54
End: 2024-11-15
Payer: COMMERCIAL

## 2024-11-15 ENCOUNTER — APPOINTMENT (OUTPATIENT)
Dept: MRI IMAGING | Facility: CLINIC | Age: 54
End: 2024-11-15
Payer: COMMERCIAL

## 2024-11-15 PROCEDURE — 77049 MRI BREAST C-+ W/CAD BI: CPT

## 2024-11-15 PROCEDURE — A9585: CPT

## 2024-11-15 PROCEDURE — 71046 X-RAY EXAM CHEST 2 VIEWS: CPT

## 2024-11-16 PROBLEM — C50.919 BREAST CANCER: Status: ACTIVE | Noted: 2024-11-16

## 2024-11-18 ENCOUNTER — APPOINTMENT (OUTPATIENT)
Dept: NUCLEAR MEDICINE | Facility: IMAGING CENTER | Age: 54
End: 2024-11-18
Payer: COMMERCIAL

## 2024-11-18 ENCOUNTER — OUTPATIENT (OUTPATIENT)
Dept: OUTPATIENT SERVICES | Facility: HOSPITAL | Age: 54
LOS: 1 days | End: 2024-11-18
Payer: COMMERCIAL

## 2024-11-18 DIAGNOSIS — C50.211 MALIGNANT NEOPLASM OF UPPER-INNER QUADRANT OF RIGHT FEMALE BREAST: ICD-10-CM

## 2024-11-18 PROCEDURE — A9561: CPT

## 2024-11-18 PROCEDURE — 78306 BONE IMAGING WHOLE BODY: CPT | Mod: 26

## 2024-11-18 PROCEDURE — 78306 BONE IMAGING WHOLE BODY: CPT

## 2024-11-20 ENCOUNTER — EMERGENCY (EMERGENCY)
Facility: HOSPITAL | Age: 54
LOS: 1 days | Discharge: HOME | End: 2024-11-20
Attending: STUDENT IN AN ORGANIZED HEALTH CARE EDUCATION/TRAINING PROGRAM
Payer: COMMERCIAL

## 2024-11-20 VITALS
OXYGEN SATURATION: 99 % | TEMPERATURE: 98 F | RESPIRATION RATE: 18 BRPM | HEART RATE: 89 BPM | SYSTOLIC BLOOD PRESSURE: 123 MMHG | DIASTOLIC BLOOD PRESSURE: 67 MMHG | WEIGHT: 143.96 LBS | HEIGHT: 63 IN

## 2024-11-20 LAB
ALBUMIN SERPL ELPH-MCNC: 3.4 G/DL — LOW (ref 3.5–5)
ALP SERPL-CCNC: 106 U/L — SIGNIFICANT CHANGE UP (ref 40–120)
ALT FLD-CCNC: 18 U/L DA — SIGNIFICANT CHANGE UP (ref 10–60)
ANION GAP SERPL CALC-SCNC: 6 MMOL/L — SIGNIFICANT CHANGE UP (ref 5–17)
APPEARANCE UR: CLEAR — SIGNIFICANT CHANGE UP
AST SERPL-CCNC: 47 U/L — HIGH (ref 10–40)
BACTERIA # UR AUTO: ABNORMAL /HPF
BASOPHILS # BLD AUTO: 0.1 K/UL — SIGNIFICANT CHANGE UP (ref 0–0.2)
BASOPHILS NFR BLD AUTO: 0.6 % — SIGNIFICANT CHANGE UP (ref 0–2)
BILIRUB SERPL-MCNC: 1 MG/DL — SIGNIFICANT CHANGE UP (ref 0.2–1.2)
BILIRUB UR-MCNC: NEGATIVE — SIGNIFICANT CHANGE UP
BUN SERPL-MCNC: 36 MG/DL — HIGH (ref 7–18)
CALCIUM SERPL-MCNC: 8.9 MG/DL — SIGNIFICANT CHANGE UP (ref 8.4–10.5)
CHLORIDE SERPL-SCNC: 105 MMOL/L — SIGNIFICANT CHANGE UP (ref 96–108)
CO2 SERPL-SCNC: 22 MMOL/L — SIGNIFICANT CHANGE UP (ref 22–31)
COLOR SPEC: YELLOW — SIGNIFICANT CHANGE UP
CREAT SERPL-MCNC: 1.52 MG/DL — HIGH (ref 0.5–1.3)
DIFF PNL FLD: ABNORMAL
EGFR: 40 ML/MIN/1.73M2 — LOW
EOSINOPHIL # BLD AUTO: 0.01 K/UL — SIGNIFICANT CHANGE UP (ref 0–0.5)
EOSINOPHIL NFR BLD AUTO: 0.1 % — SIGNIFICANT CHANGE UP (ref 0–6)
GLUCOSE SERPL-MCNC: 280 MG/DL — HIGH (ref 70–99)
GLUCOSE UR QL: >=1000 MG/DL
HCG SERPL-ACNC: 3 MIU/ML — SIGNIFICANT CHANGE UP
HCT VFR BLD CALC: 39.6 % — SIGNIFICANT CHANGE UP (ref 34.5–45)
HGB BLD-MCNC: 13.2 G/DL — SIGNIFICANT CHANGE UP (ref 11.5–15.5)
IMM GRANULOCYTES NFR BLD AUTO: 0.7 % — SIGNIFICANT CHANGE UP (ref 0–0.9)
KETONES UR-MCNC: NEGATIVE MG/DL — SIGNIFICANT CHANGE UP
LEUKOCYTE ESTERASE UR-ACNC: NEGATIVE — SIGNIFICANT CHANGE UP
LIDOCAIN IGE QN: 50 U/L — SIGNIFICANT CHANGE UP (ref 13–75)
LYMPHOCYTES # BLD AUTO: 0.72 K/UL — LOW (ref 1–3.3)
LYMPHOCYTES # BLD AUTO: 4.4 % — LOW (ref 13–44)
MCHC RBC-ENTMCNC: 28.8 PG — SIGNIFICANT CHANGE UP (ref 27–34)
MCHC RBC-ENTMCNC: 33.3 G/DL — SIGNIFICANT CHANGE UP (ref 32–36)
MCV RBC AUTO: 86.3 FL — SIGNIFICANT CHANGE UP (ref 80–100)
MONOCYTES # BLD AUTO: 0.15 K/UL — SIGNIFICANT CHANGE UP (ref 0–0.9)
MONOCYTES NFR BLD AUTO: 0.9 % — LOW (ref 2–14)
NEUTROPHILS # BLD AUTO: 15.12 K/UL — HIGH (ref 1.8–7.4)
NEUTROPHILS NFR BLD AUTO: 93.3 % — HIGH (ref 43–77)
NITRITE UR-MCNC: NEGATIVE — SIGNIFICANT CHANGE UP
NRBC # BLD: 0 /100 WBCS — SIGNIFICANT CHANGE UP (ref 0–0)
PH UR: 5.5 — SIGNIFICANT CHANGE UP (ref 5–8)
PLATELET # BLD AUTO: 249 K/UL — SIGNIFICANT CHANGE UP (ref 150–400)
POTASSIUM SERPL-MCNC: 5.5 MMOL/L — HIGH (ref 3.5–5.3)
POTASSIUM SERPL-SCNC: 5.5 MMOL/L — HIGH (ref 3.5–5.3)
PROT SERPL-MCNC: 8.5 G/DL — HIGH (ref 6–8.3)
PROT UR-MCNC: NEGATIVE MG/DL — SIGNIFICANT CHANGE UP
RBC # BLD: 4.59 M/UL — SIGNIFICANT CHANGE UP (ref 3.8–5.2)
RBC # FLD: 11.9 % — SIGNIFICANT CHANGE UP (ref 10.3–14.5)
RBC CASTS # UR COMP ASSIST: 0 /HPF — SIGNIFICANT CHANGE UP (ref 0–4)
SODIUM SERPL-SCNC: 133 MMOL/L — LOW (ref 135–145)
SP GR SPEC: 1.03 — SIGNIFICANT CHANGE UP (ref 1–1.03)
UROBILINOGEN FLD QL: 0.2 MG/DL — SIGNIFICANT CHANGE UP (ref 0.2–1)
WBC # BLD: 16.21 K/UL — HIGH (ref 3.8–10.5)
WBC # FLD AUTO: 16.21 K/UL — HIGH (ref 3.8–10.5)
WBC UR QL: 0 /HPF — SIGNIFICANT CHANGE UP (ref 0–5)

## 2024-11-20 PROCEDURE — 80053 COMPREHEN METABOLIC PANEL: CPT

## 2024-11-20 PROCEDURE — 74177 CT ABD & PELVIS W/CONTRAST: CPT | Mod: 26,MC

## 2024-11-20 PROCEDURE — 36415 COLL VENOUS BLD VENIPUNCTURE: CPT

## 2024-11-20 PROCEDURE — 74177 CT ABD & PELVIS W/CONTRAST: CPT | Mod: MC

## 2024-11-20 PROCEDURE — 84702 CHORIONIC GONADOTROPIN TEST: CPT

## 2024-11-20 PROCEDURE — 83690 ASSAY OF LIPASE: CPT

## 2024-11-20 PROCEDURE — 85025 COMPLETE CBC W/AUTO DIFF WBC: CPT

## 2024-11-20 PROCEDURE — 99284 EMERGENCY DEPT VISIT MOD MDM: CPT | Mod: 25

## 2024-11-20 PROCEDURE — 81001 URINALYSIS AUTO W/SCOPE: CPT

## 2024-11-20 PROCEDURE — 96374 THER/PROPH/DIAG INJ IV PUSH: CPT | Mod: XU

## 2024-11-20 PROCEDURE — 99285 EMERGENCY DEPT VISIT HI MDM: CPT

## 2024-11-20 RX ORDER — SODIUM CHLORIDE 9 MG/ML
1000 INJECTION, SOLUTION INTRAMUSCULAR; INTRAVENOUS; SUBCUTANEOUS ONCE
Refills: 0 | Status: COMPLETED | OUTPATIENT
Start: 2024-11-20 | End: 2024-11-20

## 2024-11-20 RX ORDER — OXYCODONE HYDROCHLORIDE 30 MG/1
1 TABLET ORAL
Qty: 5 | Refills: 0
Start: 2024-11-20 | End: 2024-11-24

## 2024-11-20 RX ORDER — KETOROLAC TROMETHAMINE 30 MG/ML
15 INJECTION INTRAMUSCULAR; INTRAVENOUS ONCE
Refills: 0 | Status: DISCONTINUED | OUTPATIENT
Start: 2024-11-20 | End: 2024-11-20

## 2024-11-20 RX ADMIN — SODIUM CHLORIDE 1000 MILLILITER(S): 9 INJECTION, SOLUTION INTRAMUSCULAR; INTRAVENOUS; SUBCUTANEOUS at 01:49

## 2024-11-20 RX ADMIN — KETOROLAC TROMETHAMINE 15 MILLIGRAM(S): 30 INJECTION INTRAMUSCULAR; INTRAVENOUS at 01:49

## 2024-11-20 NOTE — ED PROVIDER NOTE - CLINICAL SUMMARY MEDICAL DECISION MAKING FREE TEXT BOX
54-year-old female, history of diabetes, recently diagnosed breast cancer not on chemotherapy, presenting with chief complaint of right-sided flank pain.  Patient states that she does have a history of nephrolithiasis, states that current presentation is similar to prior episodes of nephrolithiasis.  She is endorsing right-sided flank pain with radiation to the groin that started 6 PM earlier tonight.   She  denies any chest pain, fever or chills.  Does endorse some nausea and vomiting. VSS afebrile.   Exam significant for right lower quadrant tenderness to palpation.  Nephrolithiasis versus appendicitis versus UTI versus gastritis.   Urinalysis, CT, and reassess.

## 2024-11-20 NOTE — ED ADULT NURSE NOTE - CHIEF COMPLAINT QUOTE
Pt reports that  pt has right Flank pain radiating to right groin  H/O  right kidney stone . Took Tylenol  20.00pm and codeine

## 2024-11-20 NOTE — ED PROVIDER NOTE - PATIENT PORTAL LINK FT
You can access the FollowMyHealth Patient Portal offered by [facility] by registering at the following website: [email or web address]. By joining [employer]’s FollowMyHealth portal, you will also be able to view your health information using other applications (apps) compatible with our system.

## 2024-11-20 NOTE — ED ADULT TRIAGE NOTE - ESI TRIAGE ACUITY LEVEL, MLM
3 Slit Excision Additional Text (Leave Blank If You Do Not Want): A linear line was drawn on the skin overlying the lesion. An incision was made slowly until the lesion was visualized.  Once visualized, the lesion was removed with blunt dissection.

## 2024-11-20 NOTE — ED PROVIDER NOTE - OBJECTIVE STATEMENT
54-year-old female, history of diabetes, recently diagnosed breast cancer not on chemotherapy, presenting with chief complaint of right-sided flank pain.  Patient states that she does have a history of nephrolithiasis, states that current presentation is similar to prior episodes of nephrolithiasis.  She is endorsing right-sided flank pain with radiation to the groin that started 6 PM earlier tonight.   She  denies any chest pain, fever or chills.  Does endorse some nausea and vomiting.

## 2024-11-20 NOTE — ED ADULT NURSE NOTE - OBJECTIVE STATEMENT
pt c/o RLQ pain radiating to the right flank area. pt states she has a hx of kidney stones. pt states she took tylenol at 2000 yesterday. pt denies fever. pt denies painful urination/blood in the urine. pt states it is difficult to start urinating. pt also c/o N/V and denies diarrhea.

## 2024-11-20 NOTE — ED PROVIDER NOTE - NSFOLLOWUPINSTRUCTIONS_ED_ALL_ED_FT
– You were found to have kidney stones.   –  To control the pain, you should take Ibuprofen 400 mg along with Tylenol 650mg every 6 hours. These are both over the counter medications that you can  at your local pharmacy without a prescription.   – We also sent a stronger pain medication (oxycodone) to your pharmacy that you should only take when you are still having pain despite trying Tylenol and Ibuprofen. DO NOT DRINK OR DRIVE while taking this medication. You can take 1 pill at night for severe pain to help you sleep.    – If you are still having severe pain in 48 hours you should return to the emergency room or a urologist if able. If you began having fever, uncontrollable nausea and vomiting then you also need to come back to the ED.   – Return for any worsening or concerning symptoms such as worsening back pain, burning with urination, nausea/vomiting, fever or chills.   – Follow up with primary care doctor in the next 5 to 7 days.

## 2024-11-20 NOTE — ED PROVIDER NOTE - PHYSICAL EXAMINATION
Gen: NAD; well appearing  Resp: CTAB, no W/R/R  CV: RRR, +S1/S2, no M/R/G  GI: Abdomen soft non-distended, +RLQ ttp  Ext: no edema, no deformity, warm and well-perfused  Skin: no rash or bruising

## 2024-11-20 NOTE — ED PROVIDER NOTE - PROGRESS NOTE DETAILS
Robbin KAUFMAN: CT showing mild to moderate right hydroureteronephrosis with a 5 mm calculus within the distal ureter and 7 mm calculus within the proximal ureter.  Pt reassessed at bedside, feeling improved and wants to go home. Discussed results with patient all questions answered. will DC

## 2024-11-21 ENCOUNTER — INPATIENT (INPATIENT)
Facility: HOSPITAL | Age: 54
LOS: 4 days | Discharge: HOME | DRG: 690 | End: 2024-11-26
Attending: UROLOGY | Admitting: UROLOGY
Payer: COMMERCIAL

## 2024-11-21 VITALS
HEIGHT: 63 IN | OXYGEN SATURATION: 96 % | RESPIRATION RATE: 20 BRPM | WEIGHT: 141.98 LBS | DIASTOLIC BLOOD PRESSURE: 56 MMHG | HEART RATE: 107 BPM | SYSTOLIC BLOOD PRESSURE: 86 MMHG | TEMPERATURE: 98 F

## 2024-11-21 DIAGNOSIS — N39.0 URINARY TRACT INFECTION, SITE NOT SPECIFIED: ICD-10-CM

## 2024-11-21 LAB
ALBUMIN SERPL ELPH-MCNC: 2.8 G/DL — LOW (ref 3.5–5)
ALP SERPL-CCNC: 137 U/L — HIGH (ref 40–120)
ALT FLD-CCNC: 26 U/L DA — SIGNIFICANT CHANGE UP (ref 10–60)
ANION GAP SERPL CALC-SCNC: 10 MMOL/L — SIGNIFICANT CHANGE UP (ref 5–17)
ANION GAP SERPL CALC-SCNC: 8 MMOL/L — SIGNIFICANT CHANGE UP (ref 5–17)
APTT BLD: 35.9 SEC — HIGH (ref 24.5–35.6)
AST SERPL-CCNC: 17 U/L — SIGNIFICANT CHANGE UP (ref 10–40)
BASOPHILS # BLD AUTO: 0.07 K/UL — SIGNIFICANT CHANGE UP (ref 0–0.2)
BASOPHILS # BLD AUTO: 0.18 K/UL — SIGNIFICANT CHANGE UP (ref 0–0.2)
BASOPHILS NFR BLD AUTO: 0.5 % — SIGNIFICANT CHANGE UP (ref 0–2)
BASOPHILS NFR BLD AUTO: 1 % — SIGNIFICANT CHANGE UP (ref 0–2)
BILIRUB SERPL-MCNC: 1.6 MG/DL — HIGH (ref 0.2–1.2)
BUN SERPL-MCNC: 34 MG/DL — HIGH (ref 7–18)
BUN SERPL-MCNC: 37 MG/DL — HIGH (ref 7–18)
CALCIUM SERPL-MCNC: 7.4 MG/DL — LOW (ref 8.4–10.5)
CALCIUM SERPL-MCNC: 9 MG/DL — SIGNIFICANT CHANGE UP (ref 8.4–10.5)
CHLORIDE SERPL-SCNC: 103 MMOL/L — SIGNIFICANT CHANGE UP (ref 96–108)
CHLORIDE SERPL-SCNC: 109 MMOL/L — HIGH (ref 96–108)
CO2 SERPL-SCNC: 20 MMOL/L — LOW (ref 22–31)
CO2 SERPL-SCNC: 20 MMOL/L — LOW (ref 22–31)
CREAT SERPL-MCNC: 1.89 MG/DL — HIGH (ref 0.5–1.3)
CREAT SERPL-MCNC: 2.31 MG/DL — HIGH (ref 0.5–1.3)
EGFR: 24 ML/MIN/1.73M2 — LOW
EGFR: 31 ML/MIN/1.73M2 — LOW
EOSINOPHIL # BLD AUTO: 0 K/UL — SIGNIFICANT CHANGE UP (ref 0–0.5)
EOSINOPHIL # BLD AUTO: 0.02 K/UL — SIGNIFICANT CHANGE UP (ref 0–0.5)
EOSINOPHIL NFR BLD AUTO: 0 % — SIGNIFICANT CHANGE UP (ref 0–6)
EOSINOPHIL NFR BLD AUTO: 0.1 % — SIGNIFICANT CHANGE UP (ref 0–6)
FLUAV AG NPH QL: SIGNIFICANT CHANGE UP
FLUBV AG NPH QL: SIGNIFICANT CHANGE UP
GLUCOSE BLDC GLUCOMTR-MCNC: 180 MG/DL — HIGH (ref 70–99)
GLUCOSE BLDC GLUCOMTR-MCNC: 232 MG/DL — HIGH (ref 70–99)
GLUCOSE SERPL-MCNC: 212 MG/DL — HIGH (ref 70–99)
GLUCOSE SERPL-MCNC: 255 MG/DL — HIGH (ref 70–99)
HCT VFR BLD CALC: 27.9 % — LOW (ref 34.5–45)
HCT VFR BLD CALC: 34.3 % — LOW (ref 34.5–45)
HGB BLD-MCNC: 11.6 G/DL — SIGNIFICANT CHANGE UP (ref 11.5–15.5)
HGB BLD-MCNC: 9.3 G/DL — LOW (ref 11.5–15.5)
IMM GRANULOCYTES NFR BLD AUTO: 6.1 % — HIGH (ref 0–0.9)
INR BLD: 1.48 RATIO — HIGH (ref 0.85–1.16)
LACTATE SERPL-SCNC: 2.3 MMOL/L — HIGH (ref 0.7–2)
LACTATE SERPL-SCNC: 2.9 MMOL/L — HIGH (ref 0.7–2)
LYMPHOCYTES # BLD AUTO: 0.99 K/UL — LOW (ref 1–3.3)
LYMPHOCYTES # BLD AUTO: 1.28 K/UL — SIGNIFICANT CHANGE UP (ref 1–3.3)
LYMPHOCYTES # BLD AUTO: 6.7 % — LOW (ref 13–44)
LYMPHOCYTES # BLD AUTO: 7 % — LOW (ref 13–44)
MCHC RBC-ENTMCNC: 28.4 PG — SIGNIFICANT CHANGE UP (ref 27–34)
MCHC RBC-ENTMCNC: 28.4 PG — SIGNIFICANT CHANGE UP (ref 27–34)
MCHC RBC-ENTMCNC: 33.3 G/DL — SIGNIFICANT CHANGE UP (ref 32–36)
MCHC RBC-ENTMCNC: 33.8 G/DL — SIGNIFICANT CHANGE UP (ref 32–36)
MCV RBC AUTO: 83.9 FL — SIGNIFICANT CHANGE UP (ref 80–100)
MCV RBC AUTO: 85.1 FL — SIGNIFICANT CHANGE UP (ref 80–100)
MONOCYTES # BLD AUTO: 0.36 K/UL — SIGNIFICANT CHANGE UP (ref 0–0.9)
MONOCYTES # BLD AUTO: 0.59 K/UL — SIGNIFICANT CHANGE UP (ref 0–0.9)
MONOCYTES NFR BLD AUTO: 2 % — SIGNIFICANT CHANGE UP (ref 2–14)
MONOCYTES NFR BLD AUTO: 4 % — SIGNIFICANT CHANGE UP (ref 2–14)
NEUTROPHILS # BLD AUTO: 12.2 K/UL — HIGH (ref 1.8–7.4)
NEUTROPHILS # BLD AUTO: 16.22 K/UL — HIGH (ref 1.8–7.4)
NEUTROPHILS NFR BLD AUTO: 82.6 % — HIGH (ref 43–77)
NEUTROPHILS NFR BLD AUTO: 83 % — HIGH (ref 43–77)
NRBC # BLD: 0 /100 WBCS — SIGNIFICANT CHANGE UP (ref 0–0)
PLATELET # BLD AUTO: 148 K/UL — LOW (ref 150–400)
PLATELET # BLD AUTO: 202 K/UL — SIGNIFICANT CHANGE UP (ref 150–400)
POTASSIUM SERPL-MCNC: 4.5 MMOL/L — SIGNIFICANT CHANGE UP (ref 3.5–5.3)
POTASSIUM SERPL-MCNC: 4.6 MMOL/L — SIGNIFICANT CHANGE UP (ref 3.5–5.3)
POTASSIUM SERPL-SCNC: 4.5 MMOL/L — SIGNIFICANT CHANGE UP (ref 3.5–5.3)
POTASSIUM SERPL-SCNC: 4.6 MMOL/L — SIGNIFICANT CHANGE UP (ref 3.5–5.3)
PROT SERPL-MCNC: 8.2 G/DL — SIGNIFICANT CHANGE UP (ref 6–8.3)
PROTHROM AB SERPL-ACNC: 17.1 SEC — HIGH (ref 9.9–13.4)
RBC # BLD: 3.28 M/UL — LOW (ref 3.8–5.2)
RBC # BLD: 4.09 M/UL — SIGNIFICANT CHANGE UP (ref 3.8–5.2)
RBC # FLD: 12.7 % — SIGNIFICANT CHANGE UP (ref 10.3–14.5)
RBC # FLD: 12.7 % — SIGNIFICANT CHANGE UP (ref 10.3–14.5)
RSV RNA NPH QL NAA+NON-PROBE: SIGNIFICANT CHANGE UP
SARS-COV-2 RNA SPEC QL NAA+PROBE: SIGNIFICANT CHANGE UP
SODIUM SERPL-SCNC: 133 MMOL/L — LOW (ref 135–145)
SODIUM SERPL-SCNC: 137 MMOL/L — SIGNIFICANT CHANGE UP (ref 135–145)
WBC # BLD: 14.77 K/UL — HIGH (ref 3.8–10.5)
WBC # BLD: 18.23 K/UL — HIGH (ref 3.8–10.5)
WBC # FLD AUTO: 14.77 K/UL — HIGH (ref 3.8–10.5)
WBC # FLD AUTO: 18.23 K/UL — HIGH (ref 3.8–10.5)

## 2024-11-21 PROCEDURE — 93010 ELECTROCARDIOGRAM REPORT: CPT

## 2024-11-21 PROCEDURE — 99222 1ST HOSP IP/OBS MODERATE 55: CPT | Mod: 25

## 2024-11-21 PROCEDURE — 52332 CYSTOSCOPY AND TREATMENT: CPT | Mod: RT

## 2024-11-21 PROCEDURE — 99291 CRITICAL CARE FIRST HOUR: CPT

## 2024-11-21 PROCEDURE — 74420 UROGRAPHY RTRGR +-KUB: CPT | Mod: 26,RT

## 2024-11-21 DEVICE — URETERAL STENT PERCUFLEX PLUS 6FR 24CM: Type: IMPLANTABLE DEVICE | Site: RIGHT | Status: FUNCTIONAL

## 2024-11-21 DEVICE — URETERAL CATH OPEN END FLEXI-TIP 5FR .038" X 70CM: Type: IMPLANTABLE DEVICE | Site: RIGHT | Status: FUNCTIONAL

## 2024-11-21 DEVICE — GUIDEWIRE SENSOR DUAL-FLEX NITINOL STRAIGHT .035" X 150CM: Type: IMPLANTABLE DEVICE | Site: RIGHT | Status: FUNCTIONAL

## 2024-11-21 RX ORDER — KETOROLAC TROMETHAMINE 30 MG/ML
30 INJECTION INTRAMUSCULAR; INTRAVENOUS EVERY 6 HOURS
Refills: 0 | Status: DISCONTINUED | OUTPATIENT
Start: 2024-11-21 | End: 2024-11-26

## 2024-11-21 RX ORDER — ACETAMINOPHEN 500MG 500 MG/1
650 TABLET, COATED ORAL EVERY 6 HOURS
Refills: 0 | Status: DISCONTINUED | OUTPATIENT
Start: 2024-11-21 | End: 2024-11-26

## 2024-11-21 RX ORDER — FENTANYL 12 UG/H
25 PATCH, EXTENDED RELEASE TRANSDERMAL
Refills: 0 | Status: DISCONTINUED | OUTPATIENT
Start: 2024-11-21 | End: 2024-11-22

## 2024-11-21 RX ORDER — CEFTRIAXONE SODIUM 1 G
1000 VIAL (EA) INJECTION EVERY 24 HOURS
Refills: 0 | Status: DISCONTINUED | OUTPATIENT
Start: 2024-11-21 | End: 2024-11-22

## 2024-11-21 RX ORDER — 0.9 % SODIUM CHLORIDE 0.9 %
1000 INTRAVENOUS SOLUTION INTRAVENOUS
Refills: 0 | Status: DISCONTINUED | OUTPATIENT
Start: 2024-11-21 | End: 2024-11-26

## 2024-11-21 RX ORDER — 0.9 % SODIUM CHLORIDE 0.9 %
1000 INTRAVENOUS SOLUTION INTRAVENOUS ONCE
Refills: 0 | Status: COMPLETED | OUTPATIENT
Start: 2024-11-21 | End: 2024-11-21

## 2024-11-21 RX ORDER — CEFTRIAXONE SODIUM 1 G
1000 VIAL (EA) INJECTION ONCE
Refills: 0 | Status: COMPLETED | OUTPATIENT
Start: 2024-11-21 | End: 2024-11-21

## 2024-11-21 RX ORDER — HYDROMORPHONE HYDROCHLORIDE 2 MG/1
0.5 TABLET ORAL
Refills: 0 | Status: DISCONTINUED | OUTPATIENT
Start: 2024-11-21 | End: 2024-11-26

## 2024-11-21 RX ORDER — SODIUM CHLORIDE 9 MG/ML
2000 INJECTION, SOLUTION INTRAMUSCULAR; INTRAVENOUS; SUBCUTANEOUS ONCE
Refills: 0 | Status: COMPLETED | OUTPATIENT
Start: 2024-11-21 | End: 2024-11-21

## 2024-11-21 RX ORDER — KETOROLAC TROMETHAMINE 30 MG/ML
15 INJECTION INTRAMUSCULAR; INTRAVENOUS ONCE
Refills: 0 | Status: DISCONTINUED | OUTPATIENT
Start: 2024-11-21 | End: 2024-11-21

## 2024-11-21 RX ORDER — ONDANSETRON HYDROCHLORIDE 4 MG/1
4 TABLET, FILM COATED ORAL EVERY 6 HOURS
Refills: 0 | Status: DISCONTINUED | OUTPATIENT
Start: 2024-11-21 | End: 2024-11-26

## 2024-11-21 RX ORDER — GLUCAGON INJECTION, SOLUTION 0.5 MG/.1ML
1 INJECTION, SOLUTION SUBCUTANEOUS ONCE
Refills: 0 | Status: DISCONTINUED | OUTPATIENT
Start: 2024-11-21 | End: 2024-11-26

## 2024-11-21 RX ORDER — ONDANSETRON HYDROCHLORIDE 4 MG/1
4 TABLET, FILM COATED ORAL ONCE
Refills: 0 | Status: DISCONTINUED | OUTPATIENT
Start: 2024-11-21 | End: 2024-11-22

## 2024-11-21 RX ORDER — 0.9 % SODIUM CHLORIDE 0.9 %
1000 INTRAVENOUS SOLUTION INTRAVENOUS
Refills: 0 | Status: DISCONTINUED | OUTPATIENT
Start: 2024-11-21 | End: 2024-11-22

## 2024-11-21 RX ADMIN — Medication 4: at 21:55

## 2024-11-21 RX ADMIN — Medication 100 MILLIGRAM(S): at 14:37

## 2024-11-21 RX ADMIN — HYDROMORPHONE HYDROCHLORIDE 0.5 MILLIGRAM(S): 2 TABLET ORAL at 23:38

## 2024-11-21 RX ADMIN — KETOROLAC TROMETHAMINE 15 MILLIGRAM(S): 30 INJECTION INTRAMUSCULAR; INTRAVENOUS at 14:37

## 2024-11-21 RX ADMIN — Medication 1000 MILLILITER(S): at 16:20

## 2024-11-21 RX ADMIN — SODIUM CHLORIDE 2000 MILLILITER(S): 9 INJECTION, SOLUTION INTRAMUSCULAR; INTRAVENOUS; SUBCUTANEOUS at 14:33

## 2024-11-21 NOTE — PATIENT PROFILE ADULT - FALL HARM RISK - HARM RISK INTERVENTIONS

## 2024-11-21 NOTE — ED ADULT NURSE NOTE - NSFALLRISKINTERV_ED_ALL_ED
Assistance OOB with selected safe patient handling equipment if applicable/Assistance with ambulation/Communicate fall risk and risk factors to all staff, patient, and family/Encourage patient to sit up slowly, dangle for a short time, stand at bedside before walking/Monitor gait and stability/Orthostatic vital signs/Provide visual cue: yellow wristband, yellow gown, etc/Reinforce activity limits and safety measures with patient and family/Use of alarms - bed, stretcher, chair and/or video monitoring/Call bell, personal items and telephone in reach/Instruct patient to call for assistance before getting out of bed/chair/stretcher/Non-slip footwear applied when patient is off stretcher/[place] to call system/Physically safe environment - no spills, clutter or unnecessary equipment/Purposeful Proactive Rounding/Room/bathroom lighting operational, light cord in reach

## 2024-11-21 NOTE — H&P ADULT - ASSESSMENT
54 y.o. F with sepsis, UTI secondary to R ureteral calculus    -Admit to surgery under Dr. Redd  -OR today for cysto, R stent  -Abx  -IVF  -Keep NPO except meds  -IVF  -Pain control prn  -DVT ppx  -f/u Ucx, Bcx

## 2024-11-21 NOTE — H&P ADULT - HISTORY OF PRESENT ILLNESS
54 y.o. F with PMH DM, nephrolithiasis, recent R breast Ca presents to [facility] ER c/o persistent R flank pain. Pt was in [facility] ER early Wednesday morning, c/o R flank pain associated with N/V, chills. Pt dx with R ureteral calculus with hydroureteronephrosis secondary the R ureteral calculi. Pt felt better after pain management, and preferred to go home. However, pt's symptoms persisted. BP on presentation 86/56 mmHg, . Code sepsis called. Currently resting comfortably. Denies fever, dysuria, hematuria.     Pt notes that she was recently dx with R breast Ca, currently not on therapy.

## 2024-11-21 NOTE — ED PROVIDER NOTE - CLINICAL SUMMARY MEDICAL DECISION MAKING FREE TEXT BOX
No evidence of other specific etiology of her fever. Abdomen benign/nonperitoneal and does not require repeat CT from CT she had yesterday that I reviewed. Noted worsening RAPHAEL. No chest pain/shortness of breath. High suspicion for infected ureteral stone with hydronephrosis. Seen by JANNETTE Parikh of urology and will take for stenting. Patient voided spontaneously prior to arrival and will attempt to void following fluid bolus, requests no catheterization at this time. After fluid BP significantly improved. NAD, well-appearing. Admitted to surgery for further management.

## 2024-11-21 NOTE — ED PROVIDER NOTE - PHYSICAL EXAMINATION
Febrile rectally, hypotensive in triage, saturating well on room air  NAD, nontoxic appearing, sitting comfortably in bed, no WOB/tachypnea, speaking full sentences  Head NCAT  EOMI grossly, anicteric  MMM  RRR, nml S1/S2, no m/r/g  Lungs CTAB, no w/r/r  Abd soft, NT, ND, nml BS, no rebound or guarding, right CVAT  AAO, CN's 3-12 grossly intact  AVILA spontaneously, no leg cyanosis or edema  Skin warm, well perfused, no rashes or hives

## 2024-11-21 NOTE — ED PROVIDER NOTE - OBJECTIVE STATEMENT
54-year-old female with history of kidney stones, DM, sent in by her urologist Dr. Redd for admission for ureteral stent per patient. States she has had 2 days of nonradiating right flank pain described as a sharpness, intermittently, not significantly improved by Tylenol which she last took at 11 AM today. Associated mild lightheadedness and chills. Denies known fever, vomiting, chills, shortness of breath, urinary changes including to her flow.

## 2024-11-21 NOTE — H&P ADULT - ATTENDING COMMENTS
54 y.o. F with sepsis, UTI secondary to R ureteral calculus    -Admit to urology  -OR today for cysto, R stent  -Abx  -IVF  -Keep NPO except meds  -IVF  -Pain control prn  -DVT ppx  -f/u Ucx, Bcx

## 2024-11-21 NOTE — ED PROVIDER NOTE - CARE PLAN
1 Principal Discharge DX:	Acute UTI (urinary tract infection)  Secondary Diagnosis:	Urinary tract obstruction due to kidney stone  Secondary Diagnosis:	RAPHAEL (acute kidney injury)

## 2024-11-21 NOTE — ED ADULT NURSE NOTE - OBJECTIVE STATEMENT
Patient is c/o Rt flank pain. on arrival hypotensive and dizzy. Code sepsis activated. Patient is place on bed (no bed alarm available at this time, manager Azucena informed), bed is at the lowest position, yellow gown, red socks, fall risk wrist band in place. Patient is educated about fall prevention and possible harm related to fall. Patient verbalized understanding.

## 2024-11-22 LAB
-  CTX-M RESISTANCE MARKER: SIGNIFICANT CHANGE UP
-  ESBL: SIGNIFICANT CHANGE UP
A1C WITH ESTIMATED AVERAGE GLUCOSE RESULT: 10.1 % — HIGH (ref 4–5.6)
E COLI DNA BLD POS QL NAA+NON-PROBE: SIGNIFICANT CHANGE UP
ESTIMATED AVERAGE GLUCOSE: 243 MG/DL — HIGH (ref 68–114)
GLUCOSE BLDC GLUCOMTR-MCNC: 235 MG/DL — HIGH (ref 70–99)
GLUCOSE BLDC GLUCOMTR-MCNC: 266 MG/DL — HIGH (ref 70–99)
GLUCOSE BLDC GLUCOMTR-MCNC: 300 MG/DL — HIGH (ref 70–99)
GLUCOSE BLDC GLUCOMTR-MCNC: 311 MG/DL — HIGH (ref 70–99)
GLUCOSE BLDC GLUCOMTR-MCNC: 340 MG/DL — HIGH (ref 70–99)
GLUCOSE BLDC GLUCOMTR-MCNC: 359 MG/DL — HIGH (ref 70–99)
GRAM STN FLD: ABNORMAL
METHOD TYPE: SIGNIFICANT CHANGE UP
SPECIMEN SOURCE: SIGNIFICANT CHANGE UP
SPECIMEN SOURCE: SIGNIFICANT CHANGE UP

## 2024-11-22 PROCEDURE — 99232 SBSQ HOSP IP/OBS MODERATE 35: CPT

## 2024-11-22 RX ORDER — ERTAPENEM 1 G/1
1000 INJECTION, POWDER, LYOPHILIZED, FOR SOLUTION INTRAMUSCULAR; INTRAVENOUS EVERY 24 HOURS
Refills: 0 | Status: DISCONTINUED | OUTPATIENT
Start: 2024-11-22 | End: 2024-11-26

## 2024-11-22 RX ORDER — SIMETHICONE 125 MG
80 CAPSULE ORAL THREE TIMES A DAY
Refills: 0 | Status: DISCONTINUED | OUTPATIENT
Start: 2024-11-22 | End: 2024-11-26

## 2024-11-22 RX ADMIN — HYDROMORPHONE HYDROCHLORIDE 0.5 MILLIGRAM(S): 2 TABLET ORAL at 02:00

## 2024-11-22 RX ADMIN — ERTAPENEM 120 MILLIGRAM(S): 1 INJECTION, POWDER, LYOPHILIZED, FOR SOLUTION INTRAMUSCULAR; INTRAVENOUS at 12:33

## 2024-11-22 RX ADMIN — Medication 4: at 08:26

## 2024-11-22 RX ADMIN — Medication 10: at 12:33

## 2024-11-22 RX ADMIN — KETOROLAC TROMETHAMINE 30 MILLIGRAM(S): 30 INJECTION INTRAMUSCULAR; INTRAVENOUS at 06:50

## 2024-11-22 RX ADMIN — Medication 105 MILLILITER(S): at 12:36

## 2024-11-22 RX ADMIN — Medication 8: at 18:08

## 2024-11-22 RX ADMIN — Medication 80 MILLIGRAM(S): at 18:07

## 2024-11-22 RX ADMIN — Medication 2: at 22:04

## 2024-11-22 RX ADMIN — Medication 80 MILLIGRAM(S): at 22:03

## 2024-11-22 NOTE — CONSULT NOTE ADULT - SUBJECTIVE AND OBJECTIVE BOX
HPI:  54 y.o. F with PMH DM, nephrolithiasis, recent R breast Ca presents to Counts include 234 beds at the [facility] ER c/o persistent R flank pain. Pt was in Counts include 234 beds at the [facility] ER early Wednesday morning, c/o R flank pain associated with N/V, chills. Pt dx with R ureteral calculus with hydroureteronephrosis secondary the R ureteral calculi. Pt felt better after pain management, and preferred to go home. However, pt's symptoms persisted. BP on presentation 86/56 mmHg, . Code sepsis called. Currently resting comfortably. Denies fever, dysuria, hematuria.     Pt notes that she was recently dx with R breast Ca, currently not on therapy. (2024 15:14)      PAST MEDICAL & SURGICAL HISTORY:  Diabetes      Kidney stone      Gout      HLD (hyperlipidemia)       Section          No Known Allergies      Meds:  acetaminophen     Tablet .. 650 milliGRAM(s) Oral every 6 hours PRN  dextrose 5%. 1000 milliLiter(s) IV Continuous <Continuous>  dextrose 5%. 1000 milliLiter(s) IV Continuous <Continuous>  dextrose 50% Injectable 25 Gram(s) IV Push once  dextrose 50% Injectable 12.5 Gram(s) IV Push once  dextrose 50% Injectable 25 Gram(s) IV Push once  dextrose Oral Gel 15 Gram(s) Oral once PRN  ertapenem  IVPB 1000 milliGRAM(s) IV Intermittent every 24 hours  glucagon  Injectable 1 milliGRAM(s) IntraMuscular once  HYDROmorphone  Injectable 0.5 milliGRAM(s) IV Push every 3 hours PRN  insulin lispro (ADMELOG) corrective regimen sliding scale   SubCutaneous three times a day before meals  insulin lispro (ADMELOG) corrective regimen sliding scale   SubCutaneous at bedtime  ketorolac   Injectable 30 milliGRAM(s) IV Push every 6 hours PRN  lactated ringers. 1000 milliLiter(s) IV Continuous <Continuous>  ondansetron Injectable 4 milliGRAM(s) IV Push every 6 hours PRN      SOCIAL HISTORY:  Smoker:  YES / NO        PACK YEARS:                         WHEN QUIT?  ETOH use:  YES / NO               FREQUENCY / QUANTITY:  Ilicit Drug use:  YES / NO  Occupation:  Assisted device use (Cane / Walker):  Live with:    FAMILY HISTORY:  Family history of gout    FH: type 2 diabetes (Father)        VITALS:  Vital Signs Last 24 Hrs  T(C): 36.4 (2024 12:25), Max: 36.9 (2024 17:45)  T(F): 97.6 (2024 12:25), Max: 98.4 (2024 17:45)  HR: 69 (2024 12:25) (69 - 97)  BP: 133/78 (2024 12:25) (68/53 - 133/78)  BP(mean): 96 (2024 12:25) (59 - 100)  RR: 18 (2024 12:25) (17 - 25)  SpO2: 98% (2024 12:25) (85% - 100%)    Parameters below as of 2024 12:25  Patient On (Oxygen Delivery Method): room air        LABS/DIAGNOSTIC TESTS:                          9.3    14.77 )-----------( 148      ( 2024 16:43 )             27.9     WBC Count: 14.77 K/uL ( @ 16:43)  WBC Count: 18.23 K/uL ( @ 14:30)  WBC Count: 16.21 K/uL ( @ 02:00)          137  |  109[H]  |  34[H]  ----------------------------<  212[H]  4.5   |  20[L]  |  1.89[H]    Ca    7.4[L]      2024 16:43    TPro  8.2  /  Alb  2.8[L]  /  TBili  1.6[H]  /  DBili  x   /  AST  17  /  ALT  26  /  AlkPhos  137[H]        Urinalysis Basic - ( 2024 16:43 )    Color: x / Appearance: x / SG: x / pH: x  Gluc: 212 mg/dL / Ketone: x  / Bili: x / Urobili: x   Blood: x / Protein: x / Nitrite: x   Leuk Esterase: x / RBC: x / WBC x   Sq Epi: x / Non Sq Epi: x / Bacteria: x        LIVER FUNCTIONS - ( 2024 14:30 )  Alb: 2.8 g/dL / Pro: 8.2 g/dL / ALK PHOS: 137 U/L / ALT: 26 U/L DA / AST: 17 U/L / GGT: x             PT/INR - ( 2024 14:30 )   PT: 17.1 sec;   INR: 1.48 ratio         PTT - ( 2024 14:30 )  PTT:35.9 sec    LACTATE:Lactate, Blood: 2.3 mmol/L ( @ 15:55)      ABG -     CULTURES:   .Blood BLOOD   @ 14:30   Growth in anaerobic bottle: Gram Negative Rods  Growth in aerobic bottle: Gram Negative Rods  --    Growth in anaerobic bottle: Gram Negative Rods  Growth in aerobic bottle: Gram Negative Rods      .Blood BLOOD   @ 14:15   Growth in aerobic bottle: Gram Negative Rods  Growth in anaerobic bottle: Gram Negative Rods  Direct identification is available within approximately 3-5  hours either by Blood Panel Multiplexed PCR or Direct  MALDI-TOF. Details: [email or web address]  --  Blood Culture PCR          RADIOLOGY:      ROS  [  ] UNABLE TO ELICIT               HPI:  54 y.o. F with PMH DM, nephrolithiasis, recent R breast Ca presents to [facility] ER c/o persistent R flank pain. Pt was in [facility] ER early Wednesday morning, c/o R flank pain associated with N/V, chills. Pt dx with R ureteral calculus with hydroureteronephrosis secondary the R ureteral calculi. Pt felt better after pain management, and preferred to go home. However, pt's symptoms persisted. BP on presentation 86/56 mmHg, . Code sepsis called. Currently resting comfortably. Denies fever, dysuria, hematuria.     Pt notes that she was recently dx with R breast Ca, currently not on therapy. (2024 15:14)      PAST MEDICAL & SURGICAL HISTORY:  Diabetes      Kidney stone      Gout      HLD (hyperlipidemia)       Section          No Known Allergies      Meds:  acetaminophen     Tablet .. 650 milliGRAM(s) Oral every 6 hours PRN  dextrose 5%. 1000 milliLiter(s) IV Continuous <Continuous>  dextrose 5%. 1000 milliLiter(s) IV Continuous <Continuous>  dextrose 50% Injectable 25 Gram(s) IV Push once  dextrose 50% Injectable 12.5 Gram(s) IV Push once  dextrose 50% Injectable 25 Gram(s) IV Push once  dextrose Oral Gel 15 Gram(s) Oral once PRN  ertapenem  IVPB 1000 milliGRAM(s) IV Intermittent every 24 hours  glucagon  Injectable 1 milliGRAM(s) IntraMuscular once  HYDROmorphone  Injectable 0.5 milliGRAM(s) IV Push every 3 hours PRN  insulin lispro (ADMELOG) corrective regimen sliding scale   SubCutaneous three times a day before meals  insulin lispro (ADMELOG) corrective regimen sliding scale   SubCutaneous at bedtime  ketorolac   Injectable 30 milliGRAM(s) IV Push every 6 hours PRN  lactated ringers. 1000 milliLiter(s) IV Continuous <Continuous>  ondansetron Injectable 4 milliGRAM(s) IV Push every 6 hours PRN      SOCIAL HISTORY:  Smoker:  YES / NO        PACK YEARS:                         WHEN QUIT?  ETOH use:  YES / NO               FREQUENCY / QUANTITY:  Ilicit Drug use:  YES / NO  Occupation:  Assisted device use (Cane / Walker):  Live with:    FAMILY HISTORY:  Family history of gout    FH: type 2 diabetes (Father)        VITALS:  Vital Signs Last 24 Hrs  T(C): 36.4 (2024 12:25), Max: 36.9 (2024 17:45)  T(F): 97.6 (2024 12:25), Max: 98.4 (2024 17:45)  HR: 69 (2024 12:25) (69 - 97)  BP: 133/78 (2024 12:25) (68/53 - 133/78)  BP(mean): 96 (:25) (59 - 100)  RR: 18 (:25) (17 - 25)  SpO2: 98% (:25) (85% - 100%)    Parameters below as of :25  Patient On (Oxygen Delivery Method): room air        LABS/DIAGNOSTIC TESTS:                          9.3    14.77 )-----------( 148      ( 2024 16:43 )             27.9     WBC Count: 14.77 K/uL ( @ 16:43)  WBC Count: 18.23 K/uL ( @ 14:30)  WBC Count: 16.21 K/uL ( @ 02:00)          137  |  109[H]  |  34[H]  ----------------------------<  212[H]  4.5   |  20[L]  |  1.89[H]    Ca    7.4[L]      2024 16:43    TPro  8.2  /  Alb  2.8[L]  /  TBili  1.6[H]  /  DBili  x   /  AST  17  /  ALT  26  /  AlkPhos  137[H]        Urine Microscopic-Add On (NC) (24 @ 02:00)   Bacteria: Few /HPF  Red Blood Cell - Urine: 0 /HPF  White Blood Cell - Urine: 0 /HPFUrinalysis with Rflx Culture (24 @ 02:00)   Urine Appearance: Clear  Color: Yellow  Specific Gravity: 1.026  pH Urine: 5.5  Protein, Urine: Negative mg/dL  Glucose Qualitative, Urine: >=1000 mg/dL  Ketone - Urine: Negative mg/dL  Blood, Urine: Trace  Bilirubin: Negative  Urobilinogen: 0.2 mg/dL  Leukocyte Esterase Concentration: Negative  Nitrite: Negative      LIVER FUNCTIONS - ( 2024 14:30 )  Alb: 2.8 g/dL / Pro: 8.2 g/dL / ALK PHOS: 137 U/L / ALT: 26 U/L DA / AST: 17 U/L / GGT: x             PT/INR - ( 2024 14:30 )   PT: 17.1 sec;   INR: 1.48 ratio         PTT - ( 2024 14:30 )  PTT:35.9 sec    LACTATE:Lactate, Blood: 2.3 mmol/L ( @ 15:55)      ABG -     CULTURES:   .Blood BLOOD   @ 14:30   Growth in anaerobic bottle: Gram Negative Rods  Growth in aerobic bottle: Gram Negative Rods  --    Growth in anaerobic bottle: Gram Negative Rods  Growth in aerobic bottle: Gram Negative Rods      .Blood BLOOD   @ 14:15   Growth in aerobic bottle: Gram Negative Rods  Growth in anaerobic bottle: Gram Negative Rods  Direct identification is available within approximately 3-5  hours either by Blood Panel Multiplexed PCR or Direct  MALDI-TOF. Details: [email or web address]  --  Blood Culture PCR          RADIOLOGY:< from: CT Abdomen and Pelvis w/ IV Cont (24 @ 05:00) >  ACC: [id number] EXAM:  CT ABDOMEN AND PELVIS IC   ORDERED BY: EDER DEL VALLE     PROCEDURE DATE:  2024          INTERPRETATION:  CLINICAL INFORMATION: Right flank pain.    COMPARISON: CT abdomen/pelvis of 3/31/2022.    CONTRAST/COMPLICATIONS:  IVContrast: Omnipaque 350  90 cc administered   10 cc discarded  Oral Contrast: NONE      PROCEDURE:  CT of the Abdomen and Pelvis was performed.  Sagittal and coronal reformats were performed.    FINDINGS:  LOWER CHEST: Bibasilar subsegmental atelectasis.    LIVER: Steatosis.  BILE DUCTS: Normal caliber.  GALLBLADDER: Within normal limits.  SPLEEN: Within normal limits.  PANCREAS: Within normal limits.  ADRENALS: Within normal limits.  KIDNEYS/URETERS: Mild to moderate right hydroureteronephrosis with a 5 mm   calculus within the distal ureter and 7 mm calculus within the proximal   ureter. Small perinephric inflammation/fluid. Additional nonobstructing   bilateral renal calculi. No left-sided hydronephrosis. Right renal cyst.   Low-attenuation lesion within the left kidney too small to accurately   characterize.    BLADDER: Within normal limits.  REPRODUCTIVE ORGANS: Fibroid uterus.    BOWEL: No bowel obstruction. Appendix is normal.  PERITONEUM/RETROPERITONEUM: Within normal limits.  VESSELS: Within normal limits.  LYMPH NODES: No lymphadenopathy.  ABDOMINAL WALL: Within normal limits.  BONES: Within normal limits.    IMPRESSION:  Mild to moderate right hydroureteronephrosis with distal and proximal   right ureteral calculi.        --- End of Report ---            SURESH BRUNSON MD; Attending Radiologist  This document has been electronically signed. 2024  5:21AM    < end of copied text >  --------------------------------------------------------------------------------------------------------------------------------------  EXAM: [id number] - NM BONE IMG WHOLE BODY  - ORDERED BY: MADDY SWEENEY      PROCEDURE DATE:  2024           INTERPRETATION:  CLINICAL INFORMATION: Right breast cancer, bone pain.   Evaluate for osseous metastases.    RADIOPHARMACEUTICAL: 18.8 mCi Tc-99m MDP, I.V.    TECHNIQUE: Anterior and posterior whole body images were obtained 2-3   hours following administration of radiopharmaceutical.    COMPARISON: None    OTHER STUDIES USED FOR CORRELATION: CT abdomen pelvis dated 3/31/2022.    FINDINGS: Heterogeneous uptake along the cervical and thoracolumbar spine   consistent with degenerative changes. Degenerative changes are also noted   in the bilateral shoulders, AC joints, sternum, elbows, hips, SI joints,   and knees.    No definite suspicious bony lesion.    Both kidneys are visualized.    IMPRESSION: No definite scintigraphic evidence of osseous metastasis.        --- End of Report ---             MAU DUMONT DO; Resident Radiologist  This document has been electronically signed.   BRODY RUFFIN MD; Attending Nuclear Medicine   This document has been electronically signed. 2024  7:22AM    < end of copied text >        ROS  [  ] UNABLE TO ELICIT               HPI:  54 y.o. F with PMH DM, nephrolithiasis, recent R breast Ca presents to [facility] ER c/o persistent R flank pain. Pt was in [facility] ER early Wednesday morning, c/o R flank pain associated with N/V, chills. Pt dx with R ureteral calculus with hydroureteronephrosis secondary the R ureteral calculi. Pt felt better after pain management, and preferred to go home. However, pt's symptoms persisted. BP on presentation 86/56 mmHg, . Code sepsis called. Currently resting comfortably. Denies fever, dysuria, hematuria.     Pt notes that she was recently dx with R breast Ca, currently not on therapy. (2024 15:14)          History as above, asked to see this patient who presented with right sided back pain along with nausea and vomiting and came to the ER and was found to have a ureteral stone but did not want to be admitted and so was sent home with pain meds. She returned 1-2 days later as her pain was worse and she had chills and rigors as well but states she did not have a fever and denies any urinary symptoms also. She was found to have a fever on her second visit as well as an obstructing stone and now has Bacteremia with ESBL E. Coli. She has a right Ureteral stent placed yesterday and today her antibiotics were switched to Invanz from Rocephin. She states she is feeling better overall with less pain but c/o "gas".        PAST MEDICAL & SURGICAL HISTORY:  Diabetes      Kidney stone      Gout      HLD (hyperlipidemia)       Section          No Known Allergies      Meds:  acetaminophen     Tablet .. 650 milliGRAM(s) Oral every 6 hours PRN  dextrose 5%. 1000 milliLiter(s) IV Continuous <Continuous>  dextrose 5%. 1000 milliLiter(s) IV Continuous <Continuous>  dextrose 50% Injectable 25 Gram(s) IV Push once  dextrose 50% Injectable 12.5 Gram(s) IV Push once  dextrose 50% Injectable 25 Gram(s) IV Push once  dextrose Oral Gel 15 Gram(s) Oral once PRN  ertapenem  IVPB 1000 milliGRAM(s) IV Intermittent every 24 hours  glucagon  Injectable 1 milliGRAM(s) IntraMuscular once  HYDROmorphone  Injectable 0.5 milliGRAM(s) IV Push every 3 hours PRN  insulin lispro (ADMELOG) corrective regimen sliding scale   SubCutaneous three times a day before meals  insulin lispro (ADMELOG) corrective regimen sliding scale   SubCutaneous at bedtime  ketorolac   Injectable 30 milliGRAM(s) IV Push every 6 hours PRN  lactated ringers. 1000 milliLiter(s) IV Continuous <Continuous>  ondansetron Injectable 4 milliGRAM(s) IV Push every 6 hours PRN      SOCIAL HISTORY:  Smoker: no  ETOH use:  no    FAMILY HISTORY:  Family history of gout    FH: type 2 diabetes (Father)        VITALS:  Vital Signs Last 24 Hrs  T(C): 36.4 (2024 12:25), Max: 36.9 (2024 17:45)  T(F): 97.6 (2024 12:25), Max: 98.4 (2024 17:45)  HR: 69 (2024 12:25) (69 - 97)  BP: 133/78 (2024 12:25) (68/53 - 133/78)  BP(mean): 96 (2024 12:25) (59 - 100)  RR: 18 (2024 12:25) (17 - 25)  SpO2: 98% (2024 12:25) (85% - 100%)    Parameters below as of 2024 12:25  Patient On (Oxygen Delivery Method): room air        LABS/DIAGNOSTIC TESTS:                          9.3    14.77 )-----------( 148      ( 2024 16:43 )             27.9     WBC Count: 14.77 K/uL ( @ 16:43)  WBC Count: 18.23 K/uL ( @ 14:30)  WBC Count: 16.21 K/uL ( @ 02:00)          137  |  109[H]  |  34[H]  ----------------------------<  212[H]  4.5   |  20[L]  |  1.89[H]    Ca    7.4[L]      2024 16:43    TPro  8.2  /  Alb  2.8[L]  /  TBili  1.6[H]  /  DBili  x   /  AST  17  /  ALT  26  /  AlkPhos  137[H]        Urine Microscopic-Add On (NC) (24 @ 02:00)   Bacteria: Few /HPF  Red Blood Cell - Urine: 0 /HPF  White Blood Cell - Urine: 0 /HPFUrinalysis with Rflx Culture (24 @ 02:00)   Urine Appearance: Clear  Color: Yellow  Specific Gravity: 1.026  pH Urine: 5.5  Protein, Urine: Negative mg/dL  Glucose Qualitative, Urine: >=1000 mg/dL  Ketone - Urine: Negative mg/dL  Blood, Urine: Trace  Bilirubin: Negative  Urobilinogen: 0.2 mg/dL  Leukocyte Esterase Concentration: Negative  Nitrite: Negative      LIVER FUNCTIONS - ( 2024 14:30 )  Alb: 2.8 g/dL / Pro: 8.2 g/dL / ALK PHOS: 137 U/L / ALT: 26 U/L DA / AST: 17 U/L / GGT: x             PT/INR - ( 2024 14:30 )   PT: 17.1 sec;   INR: 1.48 ratio         PTT - ( 2024 14:30 )  PTT:35.9 sec    LACTATE:Lactate, Blood: 2.3 mmol/L ( @ 15:55)      ABG -     CULTURES:   .Blood BLOOD   @ 14:30   Growth in anaerobic bottle: Gram Negative Rods  Growth in aerobic bottle: Gram Negative Rods  --    Growth in anaerobic bottle: Gram Negative Rods  Growth in aerobic bottle: Gram Negative Rods      .Blood BLOOD   @ 14:15   Growth in aerobic bottle: Gram Negative Rods  Growth in anaerobic bottle: Gram Negative Rods  Direct identification is available within approximately 3-5  hours either by Blood Panel Multiplexed PCR or Direct  MALDI-TOF. Details: [email or web address]  --  Blood Culture PCR          RADIOLOGY:< from: CT Abdomen and Pelvis w/ IV Cont (24 @ 05:00) >  ACC: [id number] EXAM:  CT ABDOMEN AND PELVIS IC   ORDERED BY: EDER DEL VALLE     PROCEDURE DATE:  2024          INTERPRETATION:  CLINICAL INFORMATION: Right flank pain.    COMPARISON: CT abdomen/pelvis of 3/31/2022.    CONTRAST/COMPLICATIONS:  IVContrast: Omnipaque 350  90 cc administered   10 cc discarded  Oral Contrast: NONE      PROCEDURE:  CT of the Abdomen and Pelvis was performed.  Sagittal and coronal reformats were performed.    FINDINGS:  LOWER CHEST: Bibasilar subsegmental atelectasis.    LIVER: Steatosis.  BILE DUCTS: Normal caliber.  GALLBLADDER: Within normal limits.  SPLEEN: Within normal limits.  PANCREAS: Within normal limits.  ADRENALS: Within normal limits.  KIDNEYS/URETERS: Mild to moderate right hydroureteronephrosis with a 5 mm   calculus within the distal ureter and 7 mm calculus within the proximal   ureter. Small perinephric inflammation/fluid. Additional nonobstructing   bilateral renal calculi. No left-sided hydronephrosis. Right renal cyst.   Low-attenuation lesion within the left kidney too small to accurately   characterize.    BLADDER: Within normal limits.  REPRODUCTIVE ORGANS: Fibroid uterus.    BOWEL: No bowel obstruction. Appendix is normal.  PERITONEUM/RETROPERITONEUM: Within normal limits.  VESSELS: Within normal limits.  LYMPH NODES: No lymphadenopathy.  ABDOMINAL WALL: Within normal limits.  BONES: Within normal limits.    IMPRESSION:  Mild to moderate right hydroureteronephrosis with distal and proximal   right ureteral calculi.        --- End of Report ---            SURESH BRUNSON MD; Attending Radiologist  This document has been electronically signed. 2024  5:21AM    < end of copied text >  --------------------------------------------------------------------------------------------------------------------------------------  EXAM: [id number] - NM BONE IMG WHOLE BODY  - ORDERED BY: MADDY SWEENEY      PROCEDURE DATE:  2024           INTERPRETATION:  CLINICAL INFORMATION: Right breast cancer, bone pain.   Evaluate for osseous metastases.    RADIOPHARMACEUTICAL: 18.8 mCi Tc-99m MDP, I.V.    TECHNIQUE: Anterior and posterior whole body images were obtained 2-3   hours following administration of radiopharmaceutical.    COMPARISON: None    OTHER STUDIES USED FOR CORRELATION: CT abdomen pelvis dated 3/31/2022.    FINDINGS: Heterogeneous uptake along the cervical and thoracolumbar spine   consistent with degenerative changes. Degenerative changes are also noted   in the bilateral shoulders, AC joints, sternum, elbows, hips, SI joints,   and knees.    No definite suspicious bony lesion.    Both kidneys are visualized.    IMPRESSION: No definite scintigraphic evidence of osseous metastasis.        --- End of Report ---             MAU DUMONT DO; Resident Radiologist  This document has been electronically signed.   BRODY URFFIN MD; Attending Nuclear Medicine   This document has been electronically signed. 2024  7:22AM    < end of copied text >        ROS  [  ] UNABLE TO ELICIT               HPI:  54 y.o. F with PMH DM, nephrolithiasis, recent R breast Ca presents to [facility] ER c/o persistent R flank pain. Pt was in [facility] ER early Wednesday morning, c/o R flank pain associated with N/V, chills. Pt dx with R ureteral calculus with hydroureteronephrosis secondary the R ureteral calculi. Pt felt better after pain management, and preferred to go home. However, pt's symptoms persisted. BP on presentation 86/56 mmHg, . Code sepsis called. Currently resting comfortably. Denies fever, dysuria, hematuria.     Pt notes that she was recently dx with R breast Ca, currently not on therapy. (2024 15:14)          History as above, asked to see this patient who presented with right sided back pain along with nausea and vomiting and came to the ER and was found to have a ureteral stone but did not want to be admitted and so was sent home with pain meds. She returned 1-2 days later as her pain was worse and she had chills and rigors as well but states she did not have a fever and denies any urinary symptoms also. She was found to have a fever of 101.7  on her second visit as well as an obstructing stone and now has Bacteremia with ESBL E. Coli. She has a right Ureteral stent placed yesterday and today her antibiotics were switched to Invanz from Rocephin. She states she is feeling better overall with less pain but c/o "gas".        PAST MEDICAL & SURGICAL HISTORY:  Diabetes      Kidney stone      Gout      HLD (hyperlipidemia)       Section          No Known Allergies      Meds:  acetaminophen     Tablet .. 650 milliGRAM(s) Oral every 6 hours PRN  dextrose 5%. 1000 milliLiter(s) IV Continuous <Continuous>  dextrose 5%. 1000 milliLiter(s) IV Continuous <Continuous>  dextrose 50% Injectable 25 Gram(s) IV Push once  dextrose 50% Injectable 12.5 Gram(s) IV Push once  dextrose 50% Injectable 25 Gram(s) IV Push once  dextrose Oral Gel 15 Gram(s) Oral once PRN  ertapenem  IVPB 1000 milliGRAM(s) IV Intermittent every 24 hours  glucagon  Injectable 1 milliGRAM(s) IntraMuscular once  HYDROmorphone  Injectable 0.5 milliGRAM(s) IV Push every 3 hours PRN  insulin lispro (ADMELOG) corrective regimen sliding scale   SubCutaneous three times a day before meals  insulin lispro (ADMELOG) corrective regimen sliding scale   SubCutaneous at bedtime  ketorolac   Injectable 30 milliGRAM(s) IV Push every 6 hours PRN  lactated ringers. 1000 milliLiter(s) IV Continuous <Continuous>  ondansetron Injectable 4 milliGRAM(s) IV Push every 6 hours PRN      SOCIAL HISTORY:  Smoker: no  ETOH use:  no    FAMILY HISTORY:  Family history of gout    FH: type 2 diabetes (Father)        VITALS:  Vital Signs Last 24 Hrs  T(C): 36.4 (2024 12:25), Max: 36.9 (2024 17:45)  T(F): 97.6 (2024 12:25), Max: 98.4 (2024 17:45)  HR: 69 (2024 12:25) (69 - 97)  BP: 133/78 (2024 12:25) (68/53 - 133/78)  BP(mean): 96 (2024 12:25) (59 - 100)  RR: 18 (2024 12:25) (17 - 25)  SpO2: 98% (2024 12:25) (85% - 100%)    Parameters below as of 2024 12:25  Patient On (Oxygen Delivery Method): room air        LABS/DIAGNOSTIC TESTS:                          9.3    14.77 )-----------( 148      ( 2024 16:43 )             27.9     WBC Count: 14.77 K/uL ( @ 16:43)  WBC Count: 18.23 K/uL ( @ 14:30)  WBC Count: 16.21 K/uL ( @ 02:00)          137  |  109[H]  |  34[H]  ----------------------------<  212[H]  4.5   |  20[L]  |  1.89[H]    Ca    7.4[L]      2024 16:43    TPro  8.2  /  Alb  2.8[L]  /  TBili  1.6[H]  /  DBili  x   /  AST  17  /  ALT  26  /  AlkPhos  137[H]        Urine Microscopic-Add On (NC) (24 @ 02:00)   Bacteria: Few /HPF  Red Blood Cell - Urine: 0 /HPF  White Blood Cell - Urine: 0 /HPFUrinalysis with Rflx Culture (24 @ 02:00)   Urine Appearance: Clear  Color: Yellow  Specific Gravity: 1.026  pH Urine: 5.5  Protein, Urine: Negative mg/dL  Glucose Qualitative, Urine: >=1000 mg/dL  Ketone - Urine: Negative mg/dL  Blood, Urine: Trace  Bilirubin: Negative  Urobilinogen: 0.2 mg/dL  Leukocyte Esterase Concentration: Negative  Nitrite: Negative      LIVER FUNCTIONS - ( 2024 14:30 )  Alb: 2.8 g/dL / Pro: 8.2 g/dL / ALK PHOS: 137 U/L / ALT: 26 U/L DA / AST: 17 U/L / GGT: x             PT/INR - ( 2024 14:30 )   PT: 17.1 sec;   INR: 1.48 ratio         PTT - ( 2024 14:30 )  PTT:35.9 sec    LACTATE:Lactate, Blood: 2.3 mmol/L ( @ 15:55)      ABG -     CULTURES:   .Blood BLOOD   @ 14:30   Growth in anaerobic bottle: Gram Negative Rods  Growth in aerobic bottle: Gram Negative Rods  --    Growth in anaerobic bottle: Gram Negative Rods  Growth in aerobic bottle: Gram Negative Rods      .Blood BLOOD   @ 14:15   Growth in aerobic bottle: Gram Negative Rods  Growth in anaerobic bottle: Gram Negative Rods  Direct identification is available within approximately 3-5  hours either by Blood Panel Multiplexed PCR or Direct  MALDI-TOF. Details: [email or web address]  --  Blood Culture PCR          RADIOLOGY:< from: CT Abdomen and Pelvis w/ IV Cont (24 @ 05:00) >  ACC: [id number] EXAM:  CT ABDOMEN AND PELVIS IC   ORDERED BY: EDER DEL VALLE     PROCEDURE DATE:  2024          INTERPRETATION:  CLINICAL INFORMATION: Right flank pain.    COMPARISON: CT abdomen/pelvis of 3/31/2022.    CONTRAST/COMPLICATIONS:  IVContrast: Omnipaque 350  90 cc administered   10 cc discarded  Oral Contrast: NONE      PROCEDURE:  CT of the Abdomen and Pelvis was performed.  Sagittal and coronal reformats were performed.    FINDINGS:  LOWER CHEST: Bibasilar subsegmental atelectasis.    LIVER: Steatosis.  BILE DUCTS: Normal caliber.  GALLBLADDER: Within normal limits.  SPLEEN: Within normal limits.  PANCREAS: Within normal limits.  ADRENALS: Within normal limits.  KIDNEYS/URETERS: Mild to moderate right hydroureteronephrosis with a 5 mm   calculus within the distal ureter and 7 mm calculus within the proximal   ureter. Small perinephric inflammation/fluid. Additional nonobstructing   bilateral renal calculi. No left-sided hydronephrosis. Right renal cyst.   Low-attenuation lesion within the left kidney too small to accurately   characterize.    BLADDER: Within normal limits.  REPRODUCTIVE ORGANS: Fibroid uterus.    BOWEL: No bowel obstruction. Appendix is normal.  PERITONEUM/RETROPERITONEUM: Within normal limits.  VESSELS: Within normal limits.  LYMPH NODES: No lymphadenopathy.  ABDOMINAL WALL: Within normal limits.  BONES: Within normal limits.    IMPRESSION:  Mild to moderate right hydroureteronephrosis with distal and proximal   right ureteral calculi.        --- End of Report ---            SURESH BRUNSON MD; Attending Radiologist  This document has been electronically signed. 2024  5:21AM    < end of copied text >  --------------------------------------------------------------------------------------------------------------------------------------  EXAM: [id number] - NM BONE IMG WHOLE BODY  - ORDERED BY: MADDY SWEENEY      PROCEDURE DATE:  2024           INTERPRETATION:  CLINICAL INFORMATION: Right breast cancer, bone pain.   Evaluate for osseous metastases.    RADIOPHARMACEUTICAL: 18.8 mCi Tc-99m MDP, I.V.    TECHNIQUE: Anterior and posterior whole body images were obtained 2-3   hours following administration of radiopharmaceutical.    COMPARISON: None    OTHER STUDIES USED FOR CORRELATION: CT abdomen pelvis dated 3/31/2022.    FINDINGS: Heterogeneous uptake along the cervical and thoracolumbar spine   consistent with degenerative changes. Degenerative changes are also noted   in the bilateral shoulders, AC joints, sternum, elbows, hips, SI joints,   and knees.    No definite suspicious bony lesion.    Both kidneys are visualized.    IMPRESSION: No definite scintigraphic evidence of osseous metastasis.        --- End of Report ---             MAU DUMONT DO; Resident Radiologist  This document has been electronically signed.   BRODY RUFFIN MD; Attending Nuclear Medicine   This document has been electronically signed. 2024  7:22AM    < end of copied text >        ROS  [  ] UNABLE TO ELICIT

## 2024-11-22 NOTE — PROGRESS NOTE ADULT - ASSESSMENT
Assessment:  54y Female who is s/p. cysto with right ureteral stent placement on 11/22/24, recovering appropriately.     Plan:  - Pain control prn  - Reg diet and DC IV fluids  - IV Ertapenem based on prelim ESBL E coli bcx  - Repeat blood cultures  - ID consult Dr. Ibarra for abx duration  - Incentive Spirometry  - OOB and ambulating as tolerated  - F/u AM labs  - DVT ppx

## 2024-11-22 NOTE — CONSULT NOTE ADULT - ASSESSMENT
UTI/ Pyelonephritis ( right sided)  Bacteremia - with ESBL E. Coli  Fever - low grade  Leukocytosis      Plan - Cont Invanz 1gm iv q24hrs  repeat blood cultures on Sunday.  Mylicon 80mgs po TID for Gas pains. UTI/ Pyelonephritis ( right sided)  Bacteremia - with ESBL E. Coli  Fever   Leukocytosis      Plan - Cont Invanz 1gm iv q24hrs  repeat blood cultures on Sunday.  Mylicon 80mgs po TID for Gas pains.

## 2024-11-22 NOTE — PROGRESS NOTE ADULT - SUBJECTIVE AND OBJECTIVE BOX
POST-OPERATIVE NOTE    Subjective:   54y Female s/p cysto with right ureteral tent placement POD #0 . Seen and examined at bed side . Denies nausea, vomiting, chest pain, sob, fevers chills. Pain is well controlled. . Voiding .    Vital Signs Last 24 Hrs  T(C): 36.3 (21 Nov 2024 23:42), Max: 38.7 (21 Nov 2024 14:20)  T(F): 97.3 (21 Nov 2024 23:42), Max: 101.7 (21 Nov 2024 14:20)  HR: 79 (21 Nov 2024 23:42) (79 - 107)  BP: 101/63 (21 Nov 2024 23:42) (68/53 - 126/72)  BP(mean): 75 (21 Nov 2024 23:42) (59 - 100)  RR: 17 (21 Nov 2024 23:42) (17 - 30)  SpO2: 95% (21 Nov 2024 23:42) (85% - 100%)    Parameters below as of 21 Nov 2024 23:42  Patient On (Oxygen Delivery Method): room air      I&O's Detail    21 Nov 2024 07:01  -  22 Nov 2024 04:21  --------------------------------------------------------  IN:    Lactated Ringers: 1500 mL  Total IN: 1500 mL    OUT:    Voided (mL): 200 mL  Total OUT: 200 mL    Total NET: 1300 mL          Physical Exam:  General: NAD, resting comfortably in bed  Pulmonary: Nonlabored breathing, no respiratory distress  Cardiovascular: NSR, S1, S2  Abdominal: soft, NT/ND,  Extremities: no edema, no calf tenderness, distal pulses are palpable     LABS:                        9.3    14.77 )-----------( 148      ( 21 Nov 2024 16:43 )             27.9     11-21    137  |  109[H]  |  34[H]  ----------------------------<  212[H]  4.5   |  20[L]  |  1.89[H]    Ca    7.4[L]      21 Nov 2024 16:43    TPro  8.2  /  Alb  2.8[L]  /  TBili  1.6[H]  /  DBili  x   /  AST  17  /  ALT  26  /  AlkPhos  137[H]  11-21    LIVER FUNCTIONS - ( 21 Nov 2024 14:30 )  Alb: 2.8 g/dL / Pro: 8.2 g/dL / ALK PHOS: 137 U/L / ALT: 26 U/L DA / AST: 17 U/L / GGT: x             MEDICATIONS  (STANDING):  cefTRIAXone   IVPB 1000 milliGRAM(s) IV Intermittent every 24 hours  dextrose 5%. 1000 milliLiter(s) (50 mL/Hr) IV Continuous <Continuous>  dextrose 5%. 1000 milliLiter(s) (100 mL/Hr) IV Continuous <Continuous>  dextrose 50% Injectable 25 Gram(s) IV Push once  dextrose 50% Injectable 12.5 Gram(s) IV Push once  dextrose 50% Injectable 25 Gram(s) IV Push once  glucagon  Injectable 1 milliGRAM(s) IntraMuscular once  insulin lispro (ADMELOG) corrective regimen sliding scale   SubCutaneous three times a day before meals  insulin lispro (ADMELOG) corrective regimen sliding scale   SubCutaneous at bedtime  lactated ringers. 1000 milliLiter(s) (105 mL/Hr) IV Continuous <Continuous>  lactated ringers. 1000 milliLiter(s) (75 mL/Hr) IV Continuous <Continuous>    MEDICATIONS  (PRN):  acetaminophen     Tablet .. 650 milliGRAM(s) Oral every 6 hours PRN Temp greater or equal to 38C (100.4F), Mild Pain (1 - 3)  dextrose Oral Gel 15 Gram(s) Oral once PRN Blood Glucose LESS THAN 70 milliGRAM(s)/deciliter  fentaNYL    Injectable 25 MICROGram(s) IV Push every 5 minutes PRN Moderate Pain (4 - 6)  HYDROmorphone  Injectable 0.5 milliGRAM(s) IV Push every 3 hours PRN Severe Pain (7 - 10)  ketorolac   Injectable 30 milliGRAM(s) IV Push every 6 hours PRN Moderate Pain (4 - 6)  ondansetron Injectable 4 milliGRAM(s) IV Push once PRN Nausea and/or Vomiting  ondansetron Injectable 4 milliGRAM(s) IV Push every 6 hours PRN Nausea      Assessment:   54y Female who is s/p. cysto with right ureteral tent placement POD #0 Stable    Plan:  - Pain control prn  -Dressing change prn   - Reg diet and DC IV fluids  - Incentive Spirometry  - OOB and ambulating as tolerated  - F/u AM labs  - DVT ppx POST-OPERATIVE NOTE    Subjective:   54y Female s/p cysto with right ureteral tent placement POD #0 . Seen and examined at bed side . Denies nausea, vomiting, chest pain, sob, fevers chills. Pain is well controlled. . Voiding .    In AM: feels well, pain much improved, but still with some R flank tenderness, patient hungry    Vital Signs Last 24 Hrs  T(C): 36.3 (21 Nov 2024 23:42), Max: 38.7 (21 Nov 2024 14:20)  T(F): 97.3 (21 Nov 2024 23:42), Max: 101.7 (21 Nov 2024 14:20)  HR: 79 (21 Nov 2024 23:42) (79 - 107)  BP: 101/63 (21 Nov 2024 23:42) (68/53 - 126/72)  BP(mean): 75 (21 Nov 2024 23:42) (59 - 100)  RR: 17 (21 Nov 2024 23:42) (17 - 30)  SpO2: 95% (21 Nov 2024 23:42) (85% - 100%)    Parameters below as of 21 Nov 2024 23:42  Patient On (Oxygen Delivery Method): room air      I&O's Detail    21 Nov 2024 07:01  -  22 Nov 2024 04:21  --------------------------------------------------------  IN:    Lactated Ringers: 1500 mL  Total IN: 1500 mL    OUT:    Voided (mL): 200 mL  Total OUT: 200 mL    Total NET: 1300 mL          Physical Exam:  General: NAD, resting comfortably in bed  Pulmonary: Nonlabored breathing, no respiratory distress  Cardiovascular: NSR, S1, S2  Abdominal: soft, NT/ND,  Extremities: no edema, no calf tenderness, distal pulses are palpable     LABS:                        9.3    14.77 )-----------( 148      ( 21 Nov 2024 16:43 )             27.9     11-21    137  |  109[H]  |  34[H]  ----------------------------<  212[H]  4.5   |  20[L]  |  1.89[H]    Ca    7.4[L]      21 Nov 2024 16:43    TPro  8.2  /  Alb  2.8[L]  /  TBili  1.6[H]  /  DBili  x   /  AST  17  /  ALT  26  /  AlkPhos  137[H]  11-21    LIVER FUNCTIONS - ( 21 Nov 2024 14:30 )  Alb: 2.8 g/dL / Pro: 8.2 g/dL / ALK PHOS: 137 U/L / ALT: 26 U/L DA / AST: 17 U/L / GGT: x             MEDICATIONS  (STANDING):  cefTRIAXone   IVPB 1000 milliGRAM(s) IV Intermittent every 24 hours  dextrose 5%. 1000 milliLiter(s) (50 mL/Hr) IV Continuous <Continuous>  dextrose 5%. 1000 milliLiter(s) (100 mL/Hr) IV Continuous <Continuous>  dextrose 50% Injectable 25 Gram(s) IV Push once  dextrose 50% Injectable 12.5 Gram(s) IV Push once  dextrose 50% Injectable 25 Gram(s) IV Push once  glucagon  Injectable 1 milliGRAM(s) IntraMuscular once  insulin lispro (ADMELOG) corrective regimen sliding scale   SubCutaneous three times a day before meals  insulin lispro (ADMELOG) corrective regimen sliding scale   SubCutaneous at bedtime  lactated ringers. 1000 milliLiter(s) (105 mL/Hr) IV Continuous <Continuous>  lactated ringers. 1000 milliLiter(s) (75 mL/Hr) IV Continuous <Continuous>    MEDICATIONS  (PRN):  acetaminophen     Tablet .. 650 milliGRAM(s) Oral every 6 hours PRN Temp greater or equal to 38C (100.4F), Mild Pain (1 - 3)  dextrose Oral Gel 15 Gram(s) Oral once PRN Blood Glucose LESS THAN 70 milliGRAM(s)/deciliter  fentaNYL    Injectable 25 MICROGram(s) IV Push every 5 minutes PRN Moderate Pain (4 - 6)  HYDROmorphone  Injectable 0.5 milliGRAM(s) IV Push every 3 hours PRN Severe Pain (7 - 10)  ketorolac   Injectable 30 milliGRAM(s) IV Push every 6 hours PRN Moderate Pain (4 - 6)  ondansetron Injectable 4 milliGRAM(s) IV Push once PRN Nausea and/or Vomiting  ondansetron Injectable 4 milliGRAM(s) IV Push every 6 hours PRN Nausea

## 2024-11-23 LAB
-  AMPICILLIN/SULBACTAM: SIGNIFICANT CHANGE UP
-  AMPICILLIN: SIGNIFICANT CHANGE UP
-  AZTREONAM: SIGNIFICANT CHANGE UP
-  CEFAZOLIN: SIGNIFICANT CHANGE UP
-  CEFEPIME: SIGNIFICANT CHANGE UP
-  CEFTRIAXONE: SIGNIFICANT CHANGE UP
-  CIPROFLOXACIN: SIGNIFICANT CHANGE UP
-  ERTAPENEM: SIGNIFICANT CHANGE UP
-  GENTAMICIN: SIGNIFICANT CHANGE UP
-  IMIPENEM: SIGNIFICANT CHANGE UP
-  LEVOFLOXACIN: SIGNIFICANT CHANGE UP
-  MEROPENEM: SIGNIFICANT CHANGE UP
-  PIPERACILLIN/TAZOBACTAM: SIGNIFICANT CHANGE UP
-  TOBRAMYCIN: SIGNIFICANT CHANGE UP
-  TRIMETHOPRIM/SULFAMETHOXAZOLE: SIGNIFICANT CHANGE UP
ANION GAP SERPL CALC-SCNC: 8 MMOL/L — SIGNIFICANT CHANGE UP (ref 5–17)
BUN SERPL-MCNC: 53 MG/DL — HIGH (ref 7–18)
CALCIUM SERPL-MCNC: 8 MG/DL — LOW (ref 8.4–10.5)
CHLORIDE SERPL-SCNC: 109 MMOL/L — HIGH (ref 96–108)
CO2 SERPL-SCNC: 18 MMOL/L — LOW (ref 22–31)
CREAT SERPL-MCNC: 1.86 MG/DL — HIGH (ref 0.5–1.3)
CULTURE RESULTS: ABNORMAL
CULTURE RESULTS: ABNORMAL
EGFR: 31 ML/MIN/1.73M2 — LOW
GLUCOSE BLDC GLUCOMTR-MCNC: 181 MG/DL — HIGH (ref 70–99)
GLUCOSE BLDC GLUCOMTR-MCNC: 213 MG/DL — HIGH (ref 70–99)
GLUCOSE BLDC GLUCOMTR-MCNC: 213 MG/DL — HIGH (ref 70–99)
GLUCOSE BLDC GLUCOMTR-MCNC: 214 MG/DL — HIGH (ref 70–99)
GLUCOSE BLDC GLUCOMTR-MCNC: 238 MG/DL — HIGH (ref 70–99)
GLUCOSE SERPL-MCNC: 223 MG/DL — HIGH (ref 70–99)
HCT VFR BLD CALC: 28 % — LOW (ref 34.5–45)
HGB BLD-MCNC: 9.5 G/DL — LOW (ref 11.5–15.5)
MCHC RBC-ENTMCNC: 28 PG — SIGNIFICANT CHANGE UP (ref 27–34)
MCHC RBC-ENTMCNC: 33.9 G/DL — SIGNIFICANT CHANGE UP (ref 32–36)
MCV RBC AUTO: 82.6 FL — SIGNIFICANT CHANGE UP (ref 80–100)
METHOD TYPE: SIGNIFICANT CHANGE UP
NRBC # BLD: 0 /100 WBCS — SIGNIFICANT CHANGE UP (ref 0–0)
ORGANISM # SPEC MICROSCOPIC CNT: ABNORMAL
PLATELET # BLD AUTO: 146 K/UL — LOW (ref 150–400)
POTASSIUM SERPL-MCNC: 4.5 MMOL/L — SIGNIFICANT CHANGE UP (ref 3.5–5.3)
POTASSIUM SERPL-SCNC: 4.5 MMOL/L — SIGNIFICANT CHANGE UP (ref 3.5–5.3)
RBC # BLD: 3.39 M/UL — LOW (ref 3.8–5.2)
RBC # FLD: 12.6 % — SIGNIFICANT CHANGE UP (ref 10.3–14.5)
SODIUM SERPL-SCNC: 135 MMOL/L — SIGNIFICANT CHANGE UP (ref 135–145)
SPECIMEN SOURCE: SIGNIFICANT CHANGE UP
SPECIMEN SOURCE: SIGNIFICANT CHANGE UP
WBC # BLD: 13.71 K/UL — HIGH (ref 3.8–10.5)
WBC # FLD AUTO: 13.71 K/UL — HIGH (ref 3.8–10.5)

## 2024-11-23 PROCEDURE — 99232 SBSQ HOSP IP/OBS MODERATE 35: CPT

## 2024-11-23 RX ADMIN — KETOROLAC TROMETHAMINE 30 MILLIGRAM(S): 30 INJECTION INTRAMUSCULAR; INTRAVENOUS at 05:15

## 2024-11-23 RX ADMIN — HYDROMORPHONE HYDROCHLORIDE 0.5 MILLIGRAM(S): 2 TABLET ORAL at 10:14

## 2024-11-23 RX ADMIN — Medication 4: at 11:34

## 2024-11-23 RX ADMIN — KETOROLAC TROMETHAMINE 30 MILLIGRAM(S): 30 INJECTION INTRAMUSCULAR; INTRAVENOUS at 04:53

## 2024-11-23 RX ADMIN — Medication 4: at 09:00

## 2024-11-23 RX ADMIN — Medication 80 MILLIGRAM(S): at 21:17

## 2024-11-23 RX ADMIN — KETOROLAC TROMETHAMINE 30 MILLIGRAM(S): 30 INJECTION INTRAMUSCULAR; INTRAVENOUS at 16:19

## 2024-11-23 RX ADMIN — KETOROLAC TROMETHAMINE 30 MILLIGRAM(S): 30 INJECTION INTRAMUSCULAR; INTRAVENOUS at 22:56

## 2024-11-23 RX ADMIN — HYDROMORPHONE HYDROCHLORIDE 0.5 MILLIGRAM(S): 2 TABLET ORAL at 09:07

## 2024-11-23 RX ADMIN — Medication 2: at 17:16

## 2024-11-23 RX ADMIN — Medication 80 MILLIGRAM(S): at 05:01

## 2024-11-23 RX ADMIN — Medication 80 MILLIGRAM(S): at 14:54

## 2024-11-23 RX ADMIN — ERTAPENEM 120 MILLIGRAM(S): 1 INJECTION, POWDER, LYOPHILIZED, FOR SOLUTION INTRAMUSCULAR; INTRAVENOUS at 09:05

## 2024-11-23 RX ADMIN — KETOROLAC TROMETHAMINE 30 MILLIGRAM(S): 30 INJECTION INTRAMUSCULAR; INTRAVENOUS at 18:14

## 2024-11-23 RX ADMIN — KETOROLAC TROMETHAMINE 30 MILLIGRAM(S): 30 INJECTION INTRAMUSCULAR; INTRAVENOUS at 23:30

## 2024-11-23 NOTE — PROGRESS NOTE ADULT - SUBJECTIVE AND OBJECTIVE BOX
54y Female    Meds:  ertapenem  IVPB 1000 milliGRAM(s) IV Intermittent every 24 hours    Allergies    No Known Allergies    Intolerances        VITALS:  Vital Signs Last 24 Hrs  T(C): 36.4 (23 Nov 2024 12:40), Max: 36.6 (22 Nov 2024 20:35)  T(F): 97.6 (23 Nov 2024 12:40), Max: 97.8 (22 Nov 2024 20:35)  HR: 64 (23 Nov 2024 12:40) (64 - 71)  BP: 116/75 (23 Nov 2024 12:40) (116/75 - 149/84)  BP(mean): 89 (23 Nov 2024 12:40) (89 - 95)  RR: 18 (23 Nov 2024 12:40) (18 - 18)  SpO2: 99% (23 Nov 2024 12:40) (97% - 99%)    Parameters below as of 23 Nov 2024 12:40  Patient On (Oxygen Delivery Method): room air        LABS/DIAGNOSTIC TESTS:                          9.5    13.71 )-----------( 146      ( 23 Nov 2024 06:20 )             28.0         11-23    135  |  109[H]  |  53[H]  ----------------------------<  223[H]  4.5   |  18[L]  |  1.86[H]    Ca    8.0[L]      23 Nov 2024 06:20            CULTURES: .Blood BLOOD  11-21 @ 14:30   Growth in aerobic and anaerobic bottles: Escherichia coli  See previous culture [id number]  --    Growth in anaerobic bottle: Gram Negative Rods  Growth in aerobic bottle: Gram Negative Rods      .Blood BLOOD  11-21 @ 14:15   Growth in aerobic and anaerobic bottles: Escherichia coli  Direct identification is available within approximately 3-5  hours either by Blood Panel Multiplexed PCR or Direct  MALDI-TOF. Details: [email or web address]  --  Blood Culture PCR            RADIOLOGY:      ROS:  [  ] UNABLE TO ELICIT 54y Female who is feeling better, she still has abdominal pain (gas pain but less), she states she still has body aches and some right sided lower back pain, she has no fevers or chills, no nausea , vomiting or other complaints.    Meds:  ertapenem  IVPB 1000 milliGRAM(s) IV Intermittent every 24 hours    Allergies    No Known Allergies    Intolerances        VITALS:  Vital Signs Last 24 Hrs  T(C): 36.4 (23 Nov 2024 12:40), Max: 36.6 (22 Nov 2024 20:35)  T(F): 97.6 (23 Nov 2024 12:40), Max: 97.8 (22 Nov 2024 20:35)  HR: 64 (23 Nov 2024 12:40) (64 - 71)  BP: 116/75 (23 Nov 2024 12:40) (116/75 - 149/84)  BP(mean): 89 (23 Nov 2024 12:40) (89 - 95)  RR: 18 (23 Nov 2024 12:40) (18 - 18)  SpO2: 99% (23 Nov 2024 12:40) (97% - 99%)    Parameters below as of 23 Nov 2024 12:40  Patient On (Oxygen Delivery Method): room air        LABS/DIAGNOSTIC TESTS:                          9.5    13.71 )-----------( 146      ( 23 Nov 2024 06:20 )             28.0         11-23    135  |  109[H]  |  53[H]  ----------------------------<  223[H]  4.5   |  18[L]  |  1.86[H]    Ca    8.0[L]      23 Nov 2024 06:20            CULTURES: .Blood BLOOD  11-21 @ 14:30   Growth in aerobic and anaerobic bottles: Escherichia coli  See previous culture [id number]  --    Growth in anaerobic bottle: Gram Negative Rods  Growth in aerobic bottle: Gram Negative Rods      .Blood BLOOD  11-21 @ 14:15   Growth in aerobic and anaerobic bottles: Escherichia coli  Direct identification is available within approximately 3-5  hours either by Blood Panel Multiplexed PCR or Direct  MALDI-TOF. Details: [email or web address]  --  Blood Culture PCR            RADIOLOGY:      ROS:  [  ] UNABLE TO ELICIT

## 2024-11-23 NOTE — DIETITIAN INITIAL EVALUATION ADULT - PERTINENT LABORATORY DATA
11-23    135  |  109[H]  |  53[H]  ----------------------------<  223[H]  4.5   |  18[L]  |  1.86[H]    Ca    8.0[L]      23 Nov 2024 06:20    TPro  8.2  /  Alb  2.8[L]  /  TBili  1.6[H]  /  DBili  x   /  AST  17  /  ALT  26  /  AlkPhos  137[H]  11-21  POCT Blood Glucose.: 238 mg/dL (11-23-24 @ 08:58)  A1C with Estimated Average Glucose Result: 10.1 % (11-21-24 @ 16:43)

## 2024-11-23 NOTE — PROGRESS NOTE ADULT - ASSESSMENT
54y Female who is s/p cysto with right ureteral stent placement on 11/22/24, recovering appropriately     Plan:  - Pain control prn  - Reg diet   - IV Ertapenem based on prelim ESBL E coli bcx  - Repeat blood cultures Sunday per ID  - Appreciate ID recommendations - IV vs PO abx for discharge  - Incentive Spirometry  - OOB and ambulating as tolerated  - F/u AM labs  - DVT ppx

## 2024-11-23 NOTE — DIETITIAN INITIAL EVALUATION ADULT - ETIOLOGY
Alteration in nutrition related lab values evidenced by elevated Glu, POCT Glu, K, BUN, Cr, low Ca, Alb , Na, Cl 2/2to DM, RAPHAEL, sepsis and not taking diabetic meds.

## 2024-11-23 NOTE — DIETITIAN INITIAL EVALUATION ADULT - ORAL INTAKE PTA/DIET HISTORY
Patient is alert and oriented. She was able to provide assessment information. She indicated that she stopped taking diabetic meds for a few months but was trying to Follow-up note in document section. a diabetic diet. She has no preferences to discuss at this time and her main complaint is gassiness post surgery.  She weighed 155 lbs 1 year ago and has tried to manage her weight so lost a few pounds but also has not eaten well since the kidney stones have appeared recently.

## 2024-11-23 NOTE — DIETITIAN INITIAL EVALUATION ADULT - PERTINENT MEDS FT
MEDICATIONS  (STANDING):  dextrose 5%. 1000 milliLiter(s) (50 mL/Hr) IV Continuous <Continuous>  dextrose 5%. 1000 milliLiter(s) (100 mL/Hr) IV Continuous <Continuous>  dextrose 50% Injectable 25 Gram(s) IV Push once  dextrose 50% Injectable 12.5 Gram(s) IV Push once  dextrose 50% Injectable 25 Gram(s) IV Push once  ertapenem  IVPB 1000 milliGRAM(s) IV Intermittent every 24 hours  glucagon  Injectable 1 milliGRAM(s) IntraMuscular once  insulin lispro (ADMELOG) corrective regimen sliding scale   SubCutaneous three times a day before meals  insulin lispro (ADMELOG) corrective regimen sliding scale   SubCutaneous at bedtime  lactated ringers. 1000 milliLiter(s) (105 mL/Hr) IV Continuous <Continuous>  simethicone 80 milliGRAM(s) Chew three times a day    MEDICATIONS  (PRN):  acetaminophen     Tablet .. 650 milliGRAM(s) Oral every 6 hours PRN Temp greater or equal to 38C (100.4F), Mild Pain (1 - 3)  dextrose Oral Gel 15 Gram(s) Oral once PRN Blood Glucose LESS THAN 70 milliGRAM(s)/deciliter  HYDROmorphone  Injectable 0.5 milliGRAM(s) IV Push every 3 hours PRN Severe Pain (7 - 10)  ketorolac   Injectable 30 milliGRAM(s) IV Push every 6 hours PRN Moderate Pain (4 - 6)  ondansetron Injectable 4 milliGRAM(s) IV Push every 6 hours PRN Nausea

## 2024-11-23 NOTE — PROGRESS NOTE ADULT - ASSESSMENT
UTI/ Pyelonephritis ( right sided)  Bacteremia - with ESBL E. Coli  Fever - resolved  Leukocytosis - decreasing.      Plan - Cont Invanz 1gm iv q24hrs  repeat blood cultures on Sunday.  Mylicon 80mgs po TID for Gas pains.  if sensitive to a PO agent such as Bactrim will plan to DC home on Tuesday.

## 2024-11-23 NOTE — PROGRESS NOTE ADULT - SUBJECTIVE AND OBJECTIVE BOX
HPI:  54 y.o. F with PMH DM, nephrolithiasis, recent R breast Ca presents to [facility] ER c/o persistent R flank pain. Pt was in [facility] ER early Wednesday morning, c/o R flank pain associated with N/V, chills. Pt dx with R ureteral calculus with hydroureteronephrosis secondary the R ureteral calculi. Pt felt better after pain management, and preferred to go home. However, pt's symptoms persisted. BP on presentation 86/56 mmHg, . Code sepsis called. Currently resting comfortably. Denies fever, dysuria, hematuria.     Pt notes that she was recently dx with R breast Ca, currently not on therapy. (21 Nov 2024 15:14)      INTERVAL HPI/OVERNIGHT EVENTS: NAEO. AVSS. Patient seen and examined at bedside. C/o back pain  Voiding    Vital Signs Last 24 Hrs  T(C): 36.3 (23 Nov 2024 09:24), Max: 36.6 (22 Nov 2024 20:35)  T(F): 97.3 (23 Nov 2024 09:24), Max: 97.8 (22 Nov 2024 20:35)  HR: 66 (23 Nov 2024 09:24) (66 - 71)  BP: 125/80 (23 Nov 2024 09:24) (125/80 - 149/84)  BP(mean): 95 (23 Nov 2024 09:24) (95 - 96)  RR: 18 (23 Nov 2024 09:24) (18 - 18)  SpO2: 97% (23 Nov 2024 09:24) (97% - 99%)    Parameters below as of 23 Nov 2024 09:24  Patient On (Oxygen Delivery Method): room air      I&O's Detail    ertapenem  IVPB 1000 milliGRAM(s) IV Intermittent every 24 hours  simethicone 80 milliGRAM(s) Chew three times a day      Physical Exam:  General: AAOx3, No acute distress  HEENT: NC/AT, trachea midline  Respiratory: Nonlabored breathing, equal chest rise b/l   Skin: No jaundice, no icterus  Abdomen: soft,  nondistended, nontender, no rebound tenderness, no guarding    Lines/Drains/Tubes:     Drains/Tubes:       Labs:                        9.5    13.71 )-----------( 146      ( 23 Nov 2024 06:20 )             28.0     11-23    135  |  109[H]  |  53[H]  ----------------------------<  223[H]  4.5   |  18[L]  |  1.86[H]    Ca    8.0[L]      23 Nov 2024 06:20    TPro  8.2  /  Alb  2.8[L]  /  TBili  1.6[H]  /  DBili  x   /  AST  17  /  ALT  26  /  AlkPhos  137[H]  11-21    PT/INR - ( 21 Nov 2024 14:30 )   PT: 17.1 sec;   INR: 1.48 ratio         PTT - ( 21 Nov 2024 14:30 )  PTT:35.9 sec    RADIOLOGY & ADDITIONAL STUDIES:

## 2024-11-24 LAB
ANION GAP SERPL CALC-SCNC: 7 MMOL/L — SIGNIFICANT CHANGE UP (ref 5–17)
APPEARANCE UR: ABNORMAL
BACTERIA # UR AUTO: ABNORMAL /HPF
BILIRUB UR-MCNC: NEGATIVE — SIGNIFICANT CHANGE UP
BUN SERPL-MCNC: 43 MG/DL — HIGH (ref 7–18)
CALCIUM SERPL-MCNC: 8.3 MG/DL — LOW (ref 8.4–10.5)
CHLORIDE SERPL-SCNC: 104 MMOL/L — SIGNIFICANT CHANGE UP (ref 96–108)
CO2 SERPL-SCNC: 21 MMOL/L — LOW (ref 22–31)
COLOR SPEC: YELLOW — SIGNIFICANT CHANGE UP
CREAT SERPL-MCNC: 1.58 MG/DL — HIGH (ref 0.5–1.3)
DIFF PNL FLD: ABNORMAL
EGFR: 38 ML/MIN/1.73M2 — LOW
GLUCOSE BLDC GLUCOMTR-MCNC: 212 MG/DL — HIGH (ref 70–99)
GLUCOSE BLDC GLUCOMTR-MCNC: 231 MG/DL — HIGH (ref 70–99)
GLUCOSE BLDC GLUCOMTR-MCNC: 236 MG/DL — HIGH (ref 70–99)
GLUCOSE BLDC GLUCOMTR-MCNC: 239 MG/DL — HIGH (ref 70–99)
GLUCOSE BLDC GLUCOMTR-MCNC: 274 MG/DL — HIGH (ref 70–99)
GLUCOSE SERPL-MCNC: 252 MG/DL — HIGH (ref 70–99)
GLUCOSE UR QL: 100 MG/DL
HCT VFR BLD CALC: 29.7 % — LOW (ref 34.5–45)
HGB BLD-MCNC: 9.9 G/DL — LOW (ref 11.5–15.5)
KETONES UR-MCNC: NEGATIVE MG/DL — SIGNIFICANT CHANGE UP
LEUKOCYTE ESTERASE UR-ACNC: ABNORMAL
MCHC RBC-ENTMCNC: 28.4 PG — SIGNIFICANT CHANGE UP (ref 27–34)
MCHC RBC-ENTMCNC: 33.3 G/DL — SIGNIFICANT CHANGE UP (ref 32–36)
MCV RBC AUTO: 85.1 FL — SIGNIFICANT CHANGE UP (ref 80–100)
NITRITE UR-MCNC: NEGATIVE — SIGNIFICANT CHANGE UP
NRBC # BLD: 0 /100 WBCS — SIGNIFICANT CHANGE UP (ref 0–0)
PH UR: 5.5 — SIGNIFICANT CHANGE UP (ref 5–8)
PLATELET # BLD AUTO: 163 K/UL — SIGNIFICANT CHANGE UP (ref 150–400)
POTASSIUM SERPL-MCNC: 4.6 MMOL/L — SIGNIFICANT CHANGE UP (ref 3.5–5.3)
POTASSIUM SERPL-SCNC: 4.6 MMOL/L — SIGNIFICANT CHANGE UP (ref 3.5–5.3)
PROT UR-MCNC: 30 MG/DL
RBC # BLD: 3.49 M/UL — LOW (ref 3.8–5.2)
RBC # FLD: 12.7 % — SIGNIFICANT CHANGE UP (ref 10.3–14.5)
RBC CASTS # UR COMP ASSIST: 25 /HPF — HIGH (ref 0–4)
SODIUM SERPL-SCNC: 132 MMOL/L — LOW (ref 135–145)
SP GR SPEC: 1.01 — SIGNIFICANT CHANGE UP (ref 1–1.03)
UROBILINOGEN FLD QL: 0.2 MG/DL — SIGNIFICANT CHANGE UP (ref 0.2–1)
WBC # BLD: 12.41 K/UL — HIGH (ref 3.8–10.5)
WBC # FLD AUTO: 12.41 K/UL — HIGH (ref 3.8–10.5)
WBC UR QL: 25 /HPF — HIGH (ref 0–5)

## 2024-11-24 PROCEDURE — 99232 SBSQ HOSP IP/OBS MODERATE 35: CPT

## 2024-11-24 RX ADMIN — KETOROLAC TROMETHAMINE 30 MILLIGRAM(S): 30 INJECTION INTRAMUSCULAR; INTRAVENOUS at 22:12

## 2024-11-24 RX ADMIN — KETOROLAC TROMETHAMINE 30 MILLIGRAM(S): 30 INJECTION INTRAMUSCULAR; INTRAVENOUS at 23:00

## 2024-11-24 RX ADMIN — Medication 4: at 08:24

## 2024-11-24 RX ADMIN — Medication 4: at 11:43

## 2024-11-24 RX ADMIN — Medication 6: at 18:57

## 2024-11-24 RX ADMIN — KETOROLAC TROMETHAMINE 30 MILLIGRAM(S): 30 INJECTION INTRAMUSCULAR; INTRAVENOUS at 15:00

## 2024-11-24 RX ADMIN — HYDROMORPHONE HYDROCHLORIDE 0.5 MILLIGRAM(S): 2 TABLET ORAL at 09:05

## 2024-11-24 RX ADMIN — KETOROLAC TROMETHAMINE 30 MILLIGRAM(S): 30 INJECTION INTRAMUSCULAR; INTRAVENOUS at 06:30

## 2024-11-24 RX ADMIN — KETOROLAC TROMETHAMINE 30 MILLIGRAM(S): 30 INJECTION INTRAMUSCULAR; INTRAVENOUS at 05:50

## 2024-11-24 RX ADMIN — ERTAPENEM 120 MILLIGRAM(S): 1 INJECTION, POWDER, LYOPHILIZED, FOR SOLUTION INTRAMUSCULAR; INTRAVENOUS at 08:58

## 2024-11-24 RX ADMIN — Medication 105 MILLILITER(S): at 05:21

## 2024-11-24 RX ADMIN — KETOROLAC TROMETHAMINE 30 MILLIGRAM(S): 30 INJECTION INTRAMUSCULAR; INTRAVENOUS at 14:36

## 2024-11-24 RX ADMIN — ACETAMINOPHEN 500MG 650 MILLIGRAM(S): 500 TABLET, COATED ORAL at 03:52

## 2024-11-24 RX ADMIN — HYDROMORPHONE HYDROCHLORIDE 0.5 MILLIGRAM(S): 2 TABLET ORAL at 08:29

## 2024-11-24 RX ADMIN — ACETAMINOPHEN 500MG 650 MILLIGRAM(S): 500 TABLET, COATED ORAL at 04:30

## 2024-11-24 RX ADMIN — Medication 80 MILLIGRAM(S): at 05:21

## 2024-11-24 RX ADMIN — Medication 80 MILLIGRAM(S): at 14:36

## 2024-11-24 RX ADMIN — Medication 80 MILLIGRAM(S): at 22:10

## 2024-11-24 NOTE — PROGRESS NOTE ADULT - SUBJECTIVE AND OBJECTIVE BOX
54y Female is under our care for     MEDS:  ertapenem  IVPB 1000 milliGRAM(s) IV Intermittent every 24 hours    ALLERGIES: Allergies    No Known Allergies    Intolerances    REVIEW OF SYSTEMS:  [  ] Not able to elicit  General:	  Chest:	  GI:	  :  Skin:	  Musculoskeletal:	  Neuro:	    VITALS:  Vital Signs Last 24 Hrs  T(C): 36.8 (24 Nov 2024 05:05), Max: 36.8 (23 Nov 2024 20:56)  T(F): 98.2 (24 Nov 2024 05:05), Max: 98.2 (23 Nov 2024 20:56)  HR: 78 (24 Nov 2024 05:05) (64 - 78)  BP: 115/66 (24 Nov 2024 05:05) (115/66 - 138/89)  BP(mean): 83 (24 Nov 2024 05:05) (83 - 89)  RR: 18 (24 Nov 2024 05:05) (18 - 18)  SpO2: 99% (24 Nov 2024 05:05) (99% - 99%)    Parameters below as of 23 Nov 2024 20:56  Patient On (Oxygen Delivery Method): room air    PHYSICAL EXAM:  HEENT:  Neck:  Respiratory:  Cardiovascular:  Gastrointestinal:  :  Extremities:  Skin:  Ortho:  Neuro:    LABS/DIAGNOSTIC TESTS:                        9.9    12.41 )-----------( 163      ( 24 Nov 2024 06:55 )             29.7     WBC Count: 12.41 K/uL (11-24 @ 06:55)  WBC Count: 13.71 K/uL (11-23 @ 06:20)  WBC Count: 14.77 K/uL (11-21 @ 16:43)  WBC Count: 18.23 K/uL (11-21 @ 14:30)  WBC Count: 16.21 K/uL (11-20 @ 02:00)    11-24    132[L]  |  104  |  43[H]  ----------------------------<  252[H]  4.6   |  21[L]  |  1.58[H]    Ca    8.3[L]      24 Nov 2024 06:55    CULTURES:   .Blood BLOOD  11-21 @ 14:30   Growth in aerobic and anaerobic bottles: Escherichia coli ESBL  See previous culture [id number]  --    Growth in anaerobic bottle: Gram Negative Rods  Growth in aerobic bottle: Gram Negative Rods    .Blood BLOOD  11-21 @ 14:15   Growth in aerobic and anaerobic bottles: Escherichia coli ESBL  Direct identification is available within approximately 3-5  hours either by Blood Panel Multiplexed PCR or Direct  MALDI-TOF. Details: [email or web address]  --  Blood Culture PCR  Escherichia coli ESBL    RADIOLOGY:  no new studies 54y Female sitting up in the chair and in no acute distress. c.o mild right sided lower back pain and abdominal bloating. Passing flatus and had a normal BM today. Denies nausea or vomiting. Has not had any fevers and wbc count is decreasing.     MEDS:  ertapenem  IVPB 1000 milliGRAM(s) IV Intermittent every 24 hours    ALLERGIES: Allergies    No Known Allergies    Intolerances    REVIEW OF SYSTEMS:  [  ] Not able to elicit  General: no fevers no malaise  Chest: no cough no sob  GI: no nvd; +abdominal bloating   : no urinary sxs   Skin: no rashes  Musculoskeletal: +right side lower back pain   Neuro: no ha's no dizziness     VITALS:  Vital Signs Last 24 Hrs  T(C): 36.8 (24 Nov 2024 05:05), Max: 36.8 (23 Nov 2024 20:56)  T(F): 98.2 (24 Nov 2024 05:05), Max: 98.2 (23 Nov 2024 20:56)  HR: 78 (24 Nov 2024 05:05) (64 - 78)  BP: 115/66 (24 Nov 2024 05:05) (115/66 - 138/89)  BP(mean): 83 (24 Nov 2024 05:05) (83 - 89)  RR: 18 (24 Nov 2024 05:05) (18 - 18)  SpO2: 99% (24 Nov 2024 05:05) (99% - 99%)    Parameters below as of 23 Nov 2024 20:56  Patient On (Oxygen Delivery Method): room air    PHYSICAL EXAM:  HEENT: normocephalic, conjunctivae and sclerae clear; moist mucous membranes  Neck: supple no LN's   Respiratory: lungs clear no rales  Cardiovascular: S1 S2 reg no murmurs  Gastrointestinal: +BS with soft, nondistended abdomen; nontender  Extremities: +1 pedal edema R>L  Skin: no rashes  Ortho: no erythema or joint swelling  Neuro: AAO x 3    LABS/DIAGNOSTIC TESTS:                        9.9    12.41 )-----------( 163      ( 24 Nov 2024 06:55 )             29.7     WBC Count: 12.41 K/uL (11-24 @ 06:55)  WBC Count: 13.71 K/uL (11-23 @ 06:20)  WBC Count: 14.77 K/uL (11-21 @ 16:43)  WBC Count: 18.23 K/uL (11-21 @ 14:30)  WBC Count: 16.21 K/uL (11-20 @ 02:00)    11-24    132[L]  |  104  |  43[H]  ----------------------------<  252[H]  4.6   |  21[L]  |  1.58[H]    Ca    8.3[L]      24 Nov 2024 06:55    CULTURES:   .Blood BLOOD  11-21 @ 14:30   Growth in aerobic and anaerobic bottles: Escherichia coli ESBL  See previous culture [id number]  --    Growth in anaerobic bottle: Gram Negative Rods  Growth in aerobic bottle: Gram Negative Rods    .Blood BLOOD  11-21 @ 14:15   Growth in aerobic and anaerobic bottles: Escherichia coli ESBL  Direct identification is available within approximately 3-5  hours either by Blood Panel Multiplexed PCR or Direct  MALDI-TOF. Details: [email or web address]  --  Blood Culture PCR  Escherichia coli ESBL    RADIOLOGY:  no new studies

## 2024-11-24 NOTE — PROGRESS NOTE ADULT - SUBJECTIVE AND OBJECTIVE BOX
INTERVAL HPI/OVERNIGHT EVENTS:  Pt resting comfortably. No acute complaints.   Tolerating diet.   Diet, Regular:   Consistent Carbohydrate No Snacks (11-22-24 @ 15:57) [Active]      MEDICATIONS  (STANDING):  dextrose 5%. 1000 milliLiter(s) (50 mL/Hr) IV Continuous <Continuous>  dextrose 5%. 1000 milliLiter(s) (100 mL/Hr) IV Continuous <Continuous>  dextrose 50% Injectable 25 Gram(s) IV Push once  dextrose 50% Injectable 12.5 Gram(s) IV Push once  dextrose 50% Injectable 25 Gram(s) IV Push once  ertapenem  IVPB 1000 milliGRAM(s) IV Intermittent every 24 hours  glucagon  Injectable 1 milliGRAM(s) IntraMuscular once  insulin lispro (ADMELOG) corrective regimen sliding scale   SubCutaneous three times a day before meals  insulin lispro (ADMELOG) corrective regimen sliding scale   SubCutaneous at bedtime  lactated ringers. 1000 milliLiter(s) (105 mL/Hr) IV Continuous <Continuous>  simethicone 80 milliGRAM(s) Chew three times a day    MEDICATIONS  (PRN):  acetaminophen     Tablet .. 650 milliGRAM(s) Oral every 6 hours PRN Temp greater or equal to 38C (100.4F), Mild Pain (1 - 3)  dextrose Oral Gel 15 Gram(s) Oral once PRN Blood Glucose LESS THAN 70 milliGRAM(s)/deciliter  HYDROmorphone  Injectable 0.5 milliGRAM(s) IV Push every 3 hours PRN Severe Pain (7 - 10)  ketorolac   Injectable 30 milliGRAM(s) IV Push every 6 hours PRN Moderate Pain (4 - 6)  ondansetron Injectable 4 milliGRAM(s) IV Push every 6 hours PRN Nausea    Vital Signs Last 24 Hrs  T(C): 36.6 (24 Nov 2024 12:45), Max: 36.8 (23 Nov 2024 20:56)  T(F): 97.9 (24 Nov 2024 12:45), Max: 98.2 (23 Nov 2024 20:56)  HR: 81 (24 Nov 2024 12:45) (71 - 81)  BP: 114/72 (24 Nov 2024 12:45) (114/72 - 138/89)  BP(mean): 86 (24 Nov 2024 12:45) (83 - 86)  RR: 18 (24 Nov 2024 12:45) (18 - 18)  SpO2: 97% (24 Nov 2024 12:45) (97% - 99%)    Parameters below as of 24 Nov 2024 12:45  Patient On (Oxygen Delivery Method): room air      Physical:  General: A&Ox3. NAD.  Abdomen: Soft nondistended, nontender.    I&O's Detail  23 Nov 2024 07:01  -  24 Nov 2024 07:00  --------------------------------------------------------  IN:    Oral Fluid: 400 mL  Total IN: 400 mL    OUT:    Voided (mL): 500 mL  Total OUT: 500 mL  Total NET: -100 mL    24 Nov 2024 07:01  -  24 Nov 2024 13:02  --------------------------------------------------------  IN:  Total IN: 0 mL    OUT:    Voided (mL): 200 mL  Total OUT: 200 mL  Total NET: -200 mL      LABS:                     9.9    12.41 )-----------( 163      ( 24 Nov 2024 06:55 )             29.7             11-24    132[L]  |  104  |  43[H]  ----------------------------<  252[H]  4.6   |  21[L]  |  1.58[H]    Ca    8.3[L]      24 Nov 2024 06:55

## 2024-11-24 NOTE — PROGRESS NOTE ADULT - ASSESSMENT
UTI/ Pyelonephritis ( right sided)  Bacteremia - with ESBL E. Coli  Fever - resolved  Leukocytosis - decreasing.    Plan -   ·	Cont Invanz 1gm iv q24hrs  ·	repeat blood cultures today  ·	Mylicon 80mgs po TID for Gas pains.  ·	as she is sensitive to PO Bactrim will plan to DC home on Tuesday.

## 2024-11-24 NOTE — PROGRESS NOTE ADULT - ASSESSMENT
54F s/p cysto with right ureteral stent placement on 11/22/24     - Pain control prn  - Reg diet   - IV Ertapenem based on prelim ESBL E coli bcx  - Repeat blood cultures Sunday per ID  - Appreciate ID recommendations - IV vs PO abx for discharge  - Incentive Spirometry  - OOB and ambulating as tolerated  - F/u AM labs  - DVT ppx    - Discussed with Dr. Redd

## 2024-11-25 ENCOUNTER — NON-APPOINTMENT (OUTPATIENT)
Age: 54
End: 2024-11-25

## 2024-11-25 ENCOUNTER — TRANSCRIPTION ENCOUNTER (OUTPATIENT)
Age: 54
End: 2024-11-25

## 2024-11-25 LAB
ANION GAP SERPL CALC-SCNC: 3 MMOL/L — LOW (ref 5–17)
ANISOCYTOSIS BLD QL: SLIGHT — SIGNIFICANT CHANGE UP
BASOPHILS # BLD AUTO: 0 K/UL — SIGNIFICANT CHANGE UP (ref 0–0.2)
BASOPHILS NFR BLD AUTO: 0 % — SIGNIFICANT CHANGE UP (ref 0–2)
BUN SERPL-MCNC: 31 MG/DL — HIGH (ref 7–18)
CALCIUM SERPL-MCNC: 8.4 MG/DL — SIGNIFICANT CHANGE UP (ref 8.4–10.5)
CHLORIDE SERPL-SCNC: 108 MMOL/L — SIGNIFICANT CHANGE UP (ref 96–108)
CO2 SERPL-SCNC: 23 MMOL/L — SIGNIFICANT CHANGE UP (ref 22–31)
CREAT SERPL-MCNC: 1.35 MG/DL — HIGH (ref 0.5–1.3)
EGFR: 46 ML/MIN/1.73M2 — LOW
EOSINOPHIL # BLD AUTO: 0.36 K/UL — SIGNIFICANT CHANGE UP (ref 0–0.5)
EOSINOPHIL NFR BLD AUTO: 3 % — SIGNIFICANT CHANGE UP (ref 0–6)
GLUCOSE BLDC GLUCOMTR-MCNC: 189 MG/DL — HIGH (ref 70–99)
GLUCOSE BLDC GLUCOMTR-MCNC: 194 MG/DL — HIGH (ref 70–99)
GLUCOSE BLDC GLUCOMTR-MCNC: 276 MG/DL — HIGH (ref 70–99)
GLUCOSE BLDC GLUCOMTR-MCNC: 323 MG/DL — HIGH (ref 70–99)
GLUCOSE SERPL-MCNC: 206 MG/DL — HIGH (ref 70–99)
HCT VFR BLD CALC: 30.1 % — LOW (ref 34.5–45)
HGB BLD-MCNC: 10 G/DL — LOW (ref 11.5–15.5)
LG PLATELETS BLD QL AUTO: SLIGHT — SIGNIFICANT CHANGE UP
LYMPHOCYTES # BLD AUTO: 1.78 K/UL — SIGNIFICANT CHANGE UP (ref 1–3.3)
LYMPHOCYTES # BLD AUTO: 15 % — SIGNIFICANT CHANGE UP (ref 13–44)
MAGNESIUM SERPL-MCNC: 2.3 MG/DL — SIGNIFICANT CHANGE UP (ref 1.6–2.6)
MANUAL SMEAR VERIFICATION: SIGNIFICANT CHANGE UP
MCHC RBC-ENTMCNC: 28.5 PG — SIGNIFICANT CHANGE UP (ref 27–34)
MCHC RBC-ENTMCNC: 33.2 G/DL — SIGNIFICANT CHANGE UP (ref 32–36)
MCV RBC AUTO: 85.8 FL — SIGNIFICANT CHANGE UP (ref 80–100)
MONOCYTES # BLD AUTO: 0.83 K/UL — SIGNIFICANT CHANGE UP (ref 0–0.9)
MONOCYTES NFR BLD AUTO: 7 % — SIGNIFICANT CHANGE UP (ref 2–14)
NEUTROPHILS # BLD AUTO: 8.79 K/UL — HIGH (ref 1.8–7.4)
NEUTROPHILS NFR BLD AUTO: 72 % — SIGNIFICANT CHANGE UP (ref 43–77)
NEUTS BAND # BLD: 2 % — SIGNIFICANT CHANGE UP (ref 0–8)
NRBC # BLD: 0 /100 WBCS — SIGNIFICANT CHANGE UP (ref 0–0)
PHOSPHATE SERPL-MCNC: 3.3 MG/DL — SIGNIFICANT CHANGE UP (ref 2.5–4.5)
PLAT MORPH BLD: NORMAL — SIGNIFICANT CHANGE UP
PLATELET # BLD AUTO: 156 K/UL — SIGNIFICANT CHANGE UP (ref 150–400)
PLATELET COUNT - ESTIMATE: NORMAL — SIGNIFICANT CHANGE UP
POLYCHROMASIA BLD QL SMEAR: SLIGHT — SIGNIFICANT CHANGE UP
POTASSIUM SERPL-MCNC: 4.8 MMOL/L — SIGNIFICANT CHANGE UP (ref 3.5–5.3)
POTASSIUM SERPL-SCNC: 4.8 MMOL/L — SIGNIFICANT CHANGE UP (ref 3.5–5.3)
RBC # BLD: 3.51 M/UL — LOW (ref 3.8–5.2)
RBC # FLD: 12.9 % — SIGNIFICANT CHANGE UP (ref 10.3–14.5)
RBC BLD AUTO: ABNORMAL
SODIUM SERPL-SCNC: 134 MMOL/L — LOW (ref 135–145)
STOMATOCYTES BLD QL SMEAR: SLIGHT — SIGNIFICANT CHANGE UP
VARIANT LYMPHS # BLD: 1 % — SIGNIFICANT CHANGE UP (ref 0–6)
WBC # BLD: 11.88 K/UL — HIGH (ref 3.8–10.5)
WBC # FLD AUTO: 11.88 K/UL — HIGH (ref 3.8–10.5)

## 2024-11-25 PROCEDURE — 99232 SBSQ HOSP IP/OBS MODERATE 35: CPT

## 2024-11-25 RX ADMIN — Medication 80 MILLIGRAM(S): at 13:57

## 2024-11-25 RX ADMIN — ERTAPENEM 120 MILLIGRAM(S): 1 INJECTION, POWDER, LYOPHILIZED, FOR SOLUTION INTRAMUSCULAR; INTRAVENOUS at 09:10

## 2024-11-25 RX ADMIN — Medication 6: at 16:39

## 2024-11-25 RX ADMIN — HYDROMORPHONE HYDROCHLORIDE 0.5 MILLIGRAM(S): 2 TABLET ORAL at 11:51

## 2024-11-25 RX ADMIN — KETOROLAC TROMETHAMINE 30 MILLIGRAM(S): 30 INJECTION INTRAMUSCULAR; INTRAVENOUS at 22:01

## 2024-11-25 RX ADMIN — ACETAMINOPHEN 500MG 650 MILLIGRAM(S): 500 TABLET, COATED ORAL at 16:43

## 2024-11-25 RX ADMIN — Medication 2: at 08:01

## 2024-11-25 RX ADMIN — HYDROMORPHONE HYDROCHLORIDE 0.5 MILLIGRAM(S): 2 TABLET ORAL at 01:22

## 2024-11-25 RX ADMIN — Medication 4: at 21:52

## 2024-11-25 RX ADMIN — KETOROLAC TROMETHAMINE 30 MILLIGRAM(S): 30 INJECTION INTRAMUSCULAR; INTRAVENOUS at 09:02

## 2024-11-25 RX ADMIN — KETOROLAC TROMETHAMINE 30 MILLIGRAM(S): 30 INJECTION INTRAMUSCULAR; INTRAVENOUS at 21:53

## 2024-11-25 RX ADMIN — HYDROMORPHONE HYDROCHLORIDE 0.5 MILLIGRAM(S): 2 TABLET ORAL at 02:20

## 2024-11-25 RX ADMIN — Medication 80 MILLIGRAM(S): at 21:23

## 2024-11-25 RX ADMIN — Medication 80 MILLIGRAM(S): at 05:33

## 2024-11-25 RX ADMIN — ACETAMINOPHEN 500MG 650 MILLIGRAM(S): 500 TABLET, COATED ORAL at 17:43

## 2024-11-25 RX ADMIN — KETOROLAC TROMETHAMINE 30 MILLIGRAM(S): 30 INJECTION INTRAMUSCULAR; INTRAVENOUS at 08:02

## 2024-11-25 RX ADMIN — HYDROMORPHONE HYDROCHLORIDE 0.5 MILLIGRAM(S): 2 TABLET ORAL at 10:51

## 2024-11-25 RX ADMIN — Medication 2: at 11:47

## 2024-11-25 NOTE — DISCHARGE NOTE PROVIDER - NSDCFUADDINST_GEN_ALL_CORE_FT
It is common to have blood in the urine after your procedure.  It may be pink or even red; inform your doctor if you have a significant amount of clots in the urine or if you are unable to void at all.  -It is not uncommon to have some burning when you urinate, this will improve over the next few days.  -You have an internal stent (a hollow tube that runs from the kidney to your bladder) after your procedure, helping your kidney drain down to your bladder after your surgery.  Some patients do not notice that they have a stent, while others complain of the sensation of needing to urinate frequently, burning on urination, or even some back pain (especially when they go to urinate). These sensations usually improve gradually, some faster than others. This is not uncommon, but may initially warrant the use of the pain medication which you were prescribed.  While the stent is in place, your urine may continue to be bloody. This stent is temporary and must be removed/exchanged by your urologist.  -Provided that you are not restricted with fluids by your physician, you should drink 6-8 (8 oz.) glasses of fluid per day.  -You may resume your regular diet and regular medication regimen.    -You may shower or bathe.    -You may take over the counter pain medications such as Motrin and Tylenol as needed for pain.  Do not exceed 4 grams of Tylenol daily.  Each medication may be taken every 6 hours.  You may alternate these medications such that you take either one every 3 hours.  If you have severe pain that does not improve with the pain medication or you have persistent vomiting, call your doctor.  -You may have a prescription for an antibiotic BACTRIM when you go home.  Take this medication as instructed; if you miss one dose, resume taking it on your previous schedule until you have completed the entire course of treatment.  -As you have just underwent general anesthesia, you should refrain from driving, heavy lifting, smoking, alcohol consumption, or important decision making for the next 24 hours.  You may climb stairs and you may resume sexual activity.  -Call your physician if you have a fever over 101F.  -Make a follow up appointment for with your urologist when you arrive home (or the next business day).  -Call your urologist during normal business hours with any other routine questions.    You will follow up with Dr. Redd. His office will reach out regarding follow up but please reach out if you do not hear back

## 2024-11-25 NOTE — DISCHARGE NOTE PROVIDER - HOSPITAL COURSE
54 y.o. F with PMH DM, nephrolithiasis, recent R breast Ca presents to [facility] ER c/o persistent R flank pain. Pt was in [facility] ER 11/20, c/o R flank pain associated with N/V, chills. Pt dx with R ureteral calculus with hydroureteronephrosis secondary the R ureteral calculi. Pt felt better after pain management, and preferred to go home. However, pt's symptoms persisted. BP on presentation 86/56 mmHg, . Code sepsis called.     11/20: CTAP shows 5mm distal R stone and 7mm proximal R stone, sent home w pain management  11/21: Re-presents to ED hypotensive and tachycardic with sepsis 2/2 UTI s/p R stent placement  11/22: Blood cultures positive for ESBL E coli > started Ertapenem per ID  11/23-11/26: s/p 5d ertapenem, repeat blood cultures NGTD, ESBL E coli S>bactrim    At this time, patient meets milestones for safe discharge home.

## 2024-11-25 NOTE — DISCHARGE NOTE PROVIDER - CARE PROVIDER_API CALL
Gorge Redd  Urology  [address] [place] [id number]  Phone: [phone]  Fax: [phone]  Follow Up Time:

## 2024-11-25 NOTE — PROGRESS NOTE ADULT - ASSESSMENT
54F s/p cysto with right ureteral stent placement on 11/22/24     - Pain control prn  - Reg diet   - IV Ertapenem based on prelim ESBL E coli bcx  - Repeat blood cultures Sunday per ID  - Appreciate ID recommendations - Discharge Tuesday w PO bactrim  - Incentive Spirometry  - OOB and ambulating as tolerated  - F/u AM labs  - DVT ppx    - Discussed with Dr. Redd   54F s/p cysto with right ureteral stent placement on 11/22/24     - Pain control prn  - Reg diet   - IV Ertapenem based on prelim ESBL E coli bcx  - F/u repeat cultures  - Appreciate ID recommendations - Discharge Tuesday w PO bactrim  - Incentive Spirometry  - OOB and ambulating as tolerated  - F/u AM labs  - DVT ppx    - Discussed with Dr. Redd

## 2024-11-25 NOTE — PROGRESS NOTE ADULT - ASSESSMENT
UTI/ Pyelonephritis ( right sided)  Bacteremia - with ESBL E. Coli  Fever - resolved  Leukocytosis - decreasing.    Plan -   ·	Cont Invanz 1gm iv q24hrs  ·	repeat blood cultures and urine cultures pending results  ·	Mylicon 80mgs po TID for Gas pains.  ·	as she is sensitive to PO Bactrim will plan to DC home on Tuesday. UTI/ Pyelonephritis ( right sided)  Bacteremia - with ESBL E. Coli  Fever - resolved  Leukocytosis - decreasing.    Plan -   ·	Cont Invanz 1gm iv q24hrs  ·	repeat blood cultures and urine cultures pending results  ·	Mylicon 80mgs po TID for Gas pains.  ·	as she is sensitive to PO Bactrim will plan to DC home tomorrow 11/26/2024. UTI/ Pyelonephritis ( right sided)  Bacteremia - with ESBL E. Coli  Fever - resolved  Leukocytosis - decreasing.    Plan -   ·	Cont Invanz 1gm iv q24hrs  ·	repeat blood cultures results  pending   ·	Mylicon 80mgs po TID for Gas pains.  ·	as she is sensitive to PO Bactrim will plan to DC home tomorrow 11/26/2024 on Bactrim DS 1 tab po BID x 9 days more, if her blood cultures are negative.

## 2024-11-25 NOTE — DISCHARGE NOTE PROVIDER - NSDCCPCAREPLAN_GEN_ALL_CORE_FT
PRINCIPAL DISCHARGE DIAGNOSIS  Diagnosis: Acute UTI (urinary tract infection)  Assessment and Plan of Treatment:       SECONDARY DISCHARGE DIAGNOSES  Diagnosis: Urinary tract obstruction due to kidney stone  Assessment and Plan of Treatment:     Diagnosis: RAPHAEL (acute kidney injury)  Assessment and Plan of Treatment:

## 2024-11-25 NOTE — PROGRESS NOTE ADULT - SUBJECTIVE AND OBJECTIVE BOX
54y Female is under our care for     MEDS:  ertapenem  IVPB 1000 milliGRAM(s) IV Intermittent every 24 hours    ALLERGIES: Allergies    No Known Allergies    Intolerances    REVIEW OF SYSTEMS:  [  ] Not able to elicit  General:	  Chest:	  GI:	  :  Skin:	  Musculoskeletal:	  Neuro:	    VITALS:  Vital Signs Last 24 Hrs  T(C): 36.6 (25 Nov 2024 05:30), Max: 36.9 (24 Nov 2024 21:18)  T(F): 97.9 (25 Nov 2024 05:30), Max: 98.4 (24 Nov 2024 21:18)  HR: 81 (25 Nov 2024 05:30) (81 - 85)  BP: 109/71 (25 Nov 2024 05:30) (109/71 - 116/74)  BP(mean): 86 (24 Nov 2024 12:45) (86 - 86)  RR: 18 (25 Nov 2024 05:30) (18 - 18)  SpO2: 95% (25 Nov 2024 05:30) (95% - 97%)    Parameters below as of 25 Nov 2024 05:30  Patient On (Oxygen Delivery Method): room air    PHYSICAL EXAM:  HEENT:  Neck:  Respiratory:  Cardiovascular:  Gastrointestinal:  :  Extremities:  Skin:  Ortho:  Neuro:    LABS/DIAGNOSTIC TESTS:                        10.0   11.88 )-----------( 156      ( 25 Nov 2024 07:30 )             30.1     WBC Count: 11.88 K/uL (11-25 @ 07:30)  WBC Count: 12.41 K/uL (11-24 @ 06:55)  WBC Count: 13.71 K/uL (11-23 @ 06:20)  WBC Count: 14.77 K/uL (11-21 @ 16:43)  WBC Count: 18.23 K/uL (11-21 @ 14:30)    11-25    134[L]  |  108  |  31[H]  ----------------------------<  206[H]  4.8   |  23  |  1.35[H]    Ca    8.4      25 Nov 2024 07:30  Phos  3.3     11-25  Mg     2.3     11-25    CULTURES:   .Blood BLOOD  11-21 @ 14:30   Growth in aerobic and anaerobic bottles: Escherichia coli ESBL  See previous culture [id number]  --    Growth in anaerobic bottle: Gram Negative Rods  Growth in aerobic bottle: Gram Negative Rods    .Blood BLOOD  11-21 @ 14:15   Growth in aerobic and anaerobic bottles: Escherichia coli ESBL  Direct identification is available within approximately 3-5  hours either by Blood Panel Multiplexed PCR or Direct  MALDI-TOF. Details: [email or web address]  --  Blood Culture PCR  Escherichia coli ESBL    RADIOLOGY:  no new studies 54y Female lying in bed with no overnight events. She is still having mild right sided lower back pain. Denies nausea, vomiting or diarrhea. No fevers or chills.     MEDS:  ertapenem  IVPB 1000 milliGRAM(s) IV Intermittent every 24 hours    ALLERGIES: Allergies    No Known Allergies    Intolerances    REVIEW OF SYSTEMS:  [  ] Not able to elicit  General: no fevers no malaise  Chest: no cough no sob  GI: no nvd  : no urinary sxs   Skin: no rashes  Musculoskeletal: +right side lower back pain   Neuro: no ha's no dizziness     VITALS:  Vital Signs Last 24 Hrs  T(C): 36.6 (25 Nov 2024 05:30), Max: 36.9 (24 Nov 2024 21:18)  T(F): 97.9 (25 Nov 2024 05:30), Max: 98.4 (24 Nov 2024 21:18)  HR: 81 (25 Nov 2024 05:30) (81 - 85)  BP: 109/71 (25 Nov 2024 05:30) (109/71 - 116/74)  BP(mean): 86 (24 Nov 2024 12:45) (86 - 86)  RR: 18 (25 Nov 2024 05:30) (18 - 18)  SpO2: 95% (25 Nov 2024 05:30) (95% - 97%)    Parameters below as of 25 Nov 2024 05:30  Patient On (Oxygen Delivery Method): room air    PHYSICAL EXAM:  HEENT: normocephalic, conjunctivae and sclerae clear; moist mucous membranes  Neck: supple no LN's   Respiratory: lungs clear no rales  Cardiovascular: S1 S2 reg no murmurs  Gastrointestinal: +BS with soft, nondistended abdomen; nontender  Extremities: +1 pedal edema R>L improving   Skin: no rashes  Ortho: no erythema or joint swelling  Neuro: AAO x 3    LABS/DIAGNOSTIC TESTS:                        10.0   11.88 )-----------( 156      ( 25 Nov 2024 07:30 )             30.1     WBC Count: 11.88 K/uL (11-25 @ 07:30)  WBC Count: 12.41 K/uL (11-24 @ 06:55)  WBC Count: 13.71 K/uL (11-23 @ 06:20)  WBC Count: 14.77 K/uL (11-21 @ 16:43)  WBC Count: 18.23 K/uL (11-21 @ 14:30)    11-25    134[L]  |  108  |  31[H]  ----------------------------<  206[H]  4.8   |  23  |  1.35[H]    Ca    8.4      25 Nov 2024 07:30  Phos  3.3     11-25  Mg     2.3     11-25    CULTURES:   .Blood BLOOD  11-21 @ 14:30   Growth in aerobic and anaerobic bottles: Escherichia coli ESBL  See previous culture [id number]  --    Growth in anaerobic bottle: Gram Negative Rods  Growth in aerobic bottle: Gram Negative Rods    .Blood BLOOD  11-21 @ 14:15   Growth in aerobic and anaerobic bottles: Escherichia coli ESBL  Direct identification is available within approximately 3-5  hours either by Blood Panel Multiplexed PCR or Direct  MALDI-TOF. Details: [email or web address]  --  Blood Culture PCR  Escherichia coli ESBL    RADIOLOGY:  no new studies

## 2024-11-25 NOTE — DISCHARGE NOTE PROVIDER - NSDCMRMEDTOKEN_GEN_ALL_CORE_FT
Lantus 100 units/mL subcutaneous solution: 15 unit(s) subcutaneous once a day  Pyridium 100 mg oral tablet: 1 tab(s) orally 3 times a day   tamsulosin 0.4 mg oral capsule: 1 cap(s) orally once a day  Vitamin C 250 mg oral tablet, chewable: 1 tab(s) orally once a day  Vitamin D3 1250 mcg (50,000 intl units) oral capsule: 1 cap(s) orally once a week

## 2024-11-25 NOTE — DISCHARGE NOTE PROVIDER - NSDCQMERRANDS_GEN_ALL_CORE
Heather Joya is a 38 y.o. female evaluated via telephone on 11/2/2023 for COPD (F/u COPD, BRYSON)  . Obstructive sleep apnea syndrome  Morbid obesity  Hypertension  Diabetes  Respiratory failure secondary to obesity hypoventilation syndrome      HPI:   Young lady is known to have morbid obesity with hypoventilation syndrome. She obstructive sleep apnea syndrome which is being treated with CPAP excessive Pap successfully he uses a CPAP for 6 hours every night. He has been very successful in losing weight. He also has with COPD and using Spiriva and Advair. No evidence of acute exacerbation of COPD    He does not require supplemental oxygen. He also has hypertension and diabetes which are well controlled. He been successful in losing weight. She is waiting to undergo bariatric surgery. The CPAP has been very effective in relieving the apnea improving her daytime symptoms. She has chronic pedal edema due to chronic pulmonale. Social History     Tobacco Use    Smoking status: Former     Types: Cigarettes     Passive exposure: Never    Smokeless tobacco: Never   Vaping Use    Vaping Use: Never used   Substance Use Topics    Alcohol use: Yes     Comment: rare    Drug use: Yes     Types: Marijuana Aric Putty)            RECORD REVIEW: Previous medical records were reviewed at today's visit.     Wt Readings from Last 3 Encounters:   11/02/23 (!) 235 kg (518 lb)   10/27/23 (!) 235 kg (518 lb)   10/19/23 (!) 232.2 kg (512 lb)       Results for orders placed or performed during the hospital encounter of 10/27/23   ALT   Result Value Ref Range    ALT 13 5 - 33 U/L   Alkaline Phosphatase   Result Value Ref Range    Alkaline Phosphatase 68 35 - 104 U/L   POTASSIUM (POC)   Result Value Ref Range    POC Potassium 3.6 3.5 - 4.5 mmol/L   SURGICAL PATHOLOGY REPORT   Result Value Ref Range    Surgical Pathology Report       Path Number: [id number]    -- Diagnosis --  STOMACH, BIOPSY:  -GASTRIC ANTRAL MILD TO MODERATE
No

## 2024-11-25 NOTE — PROGRESS NOTE ADULT - SUBJECTIVE AND OBJECTIVE BOX
Subjective:  Resting comfortably    Objective:    Vital signs  T(C): , Max: 36.9 (11-24-24 @ 21:18)  HR: 81 (11-25-24 @ 05:30)  BP: 109/71 (11-25-24 @ 05:30)  SpO2: 95% (11-25-24 @ 05:30)  Wt(kg): --    Output     11-24 @ 07:01  -  11-25 @ 07:00  --------------------------------------------------------  IN: 500 mL / OUT: 600 mL / NET: -100 mL        Gen: NAD  Abd: soft, nontender, nondistended  : ahuja secured in place, draining CYU    Labs                        9.9    12.41 )-----------( 163      ( 24 Nov 2024 06:55 )             29.7     24 Nov 2024 06:55    132    |  104    |  43     ----------------------------<  252    4.6     |  21     |  1.58     Ca    8.3        24 Nov 2024 06:55        Urine Cx: ?  Blood Cx: ?      Imaging Subjective:  Resting comfortably    Objective:    Vital signs  T(C): , Max: 36.9 (11-24-24 @ 21:18)  HR: 81 (11-25-24 @ 05:30)  BP: 109/71 (11-25-24 @ 05:30)  SpO2: 95% (11-25-24 @ 05:30)  Wt(kg): --    Output     11-24 @ 07:01  -  11-25 @ 07:00  --------------------------------------------------------  IN: 500 mL / OUT: 600 mL / NET: -100 mL        Gen: NAD  Abd: soft, nontender, nondistended  : voiding independently    Labs                        9.9    12.41 )-----------( 163      ( 24 Nov 2024 06:55 )             29.7     24 Nov 2024 06:55    132    |  104    |  43     ----------------------------<  252    4.6     |  21     |  1.58     Ca    8.3        24 Nov 2024 06:55        Urine Cx: ?  Blood Cx: ?      Imaging

## 2024-11-26 ENCOUNTER — TRANSCRIPTION ENCOUNTER (OUTPATIENT)
Age: 54
End: 2024-11-26

## 2024-11-26 VITALS
HEART RATE: 75 BPM | TEMPERATURE: 99 F | DIASTOLIC BLOOD PRESSURE: 73 MMHG | RESPIRATION RATE: 18 BRPM | SYSTOLIC BLOOD PRESSURE: 112 MMHG | OXYGEN SATURATION: 98 %

## 2024-11-26 LAB
GLUCOSE BLDC GLUCOMTR-MCNC: 224 MG/DL — HIGH (ref 70–99)
GLUCOSE BLDC GLUCOMTR-MCNC: 234 MG/DL — HIGH (ref 70–99)
GLUCOSE BLDC GLUCOMTR-MCNC: 240 MG/DL — HIGH (ref 70–99)

## 2024-11-26 PROCEDURE — 99232 SBSQ HOSP IP/OBS MODERATE 35: CPT

## 2024-11-26 PROCEDURE — 96374 THER/PROPH/DIAG INJ IV PUSH: CPT

## 2024-11-26 PROCEDURE — 87077 CULTURE AEROBIC IDENTIFY: CPT

## 2024-11-26 PROCEDURE — 86901 BLOOD TYPING SEROLOGIC RH(D): CPT

## 2024-11-26 PROCEDURE — 83036 HEMOGLOBIN GLYCOSYLATED A1C: CPT

## 2024-11-26 PROCEDURE — 36415 COLL VENOUS BLD VENIPUNCTURE: CPT

## 2024-11-26 PROCEDURE — C1769: CPT

## 2024-11-26 PROCEDURE — C1758: CPT

## 2024-11-26 PROCEDURE — 86850 RBC ANTIBODY SCREEN: CPT

## 2024-11-26 PROCEDURE — 99285 EMERGENCY DEPT VISIT HI MDM: CPT | Mod: 25

## 2024-11-26 PROCEDURE — 85730 THROMBOPLASTIN TIME PARTIAL: CPT

## 2024-11-26 PROCEDURE — 87186 SC STD MICRODIL/AGAR DIL: CPT

## 2024-11-26 PROCEDURE — 80048 BASIC METABOLIC PNL TOTAL CA: CPT

## 2024-11-26 PROCEDURE — 83735 ASSAY OF MAGNESIUM: CPT

## 2024-11-26 PROCEDURE — 87637 SARSCOV2&INF A&B&RSV AMP PRB: CPT

## 2024-11-26 PROCEDURE — 76000 FLUOROSCOPY <1 HR PHYS/QHP: CPT

## 2024-11-26 PROCEDURE — 83605 ASSAY OF LACTIC ACID: CPT

## 2024-11-26 PROCEDURE — C2617: CPT

## 2024-11-26 PROCEDURE — 80053 COMPREHEN METABOLIC PANEL: CPT

## 2024-11-26 PROCEDURE — 93005 ELECTROCARDIOGRAM TRACING: CPT

## 2024-11-26 PROCEDURE — 81001 URINALYSIS AUTO W/SCOPE: CPT

## 2024-11-26 PROCEDURE — 86900 BLOOD TYPING SEROLOGIC ABO: CPT

## 2024-11-26 PROCEDURE — 85610 PROTHROMBIN TIME: CPT

## 2024-11-26 PROCEDURE — 87040 BLOOD CULTURE FOR BACTERIA: CPT

## 2024-11-26 PROCEDURE — 85027 COMPLETE CBC AUTOMATED: CPT

## 2024-11-26 PROCEDURE — 96375 TX/PRO/DX INJ NEW DRUG ADDON: CPT

## 2024-11-26 PROCEDURE — 82962 GLUCOSE BLOOD TEST: CPT

## 2024-11-26 PROCEDURE — 87150 DNA/RNA AMPLIFIED PROBE: CPT

## 2024-11-26 PROCEDURE — 85025 COMPLETE CBC W/AUTO DIFF WBC: CPT

## 2024-11-26 PROCEDURE — 84100 ASSAY OF PHOSPHORUS: CPT

## 2024-11-26 RX ADMIN — ACETAMINOPHEN 500MG 650 MILLIGRAM(S): 500 TABLET, COATED ORAL at 08:49

## 2024-11-26 RX ADMIN — ERTAPENEM 120 MILLIGRAM(S): 1 INJECTION, POWDER, LYOPHILIZED, FOR SOLUTION INTRAMUSCULAR; INTRAVENOUS at 10:46

## 2024-11-26 RX ADMIN — Medication 4: at 13:07

## 2024-11-26 RX ADMIN — ACETAMINOPHEN 500MG 650 MILLIGRAM(S): 500 TABLET, COATED ORAL at 09:40

## 2024-11-26 RX ADMIN — Medication 80 MILLIGRAM(S): at 05:38

## 2024-11-26 RX ADMIN — ACETAMINOPHEN 500MG 650 MILLIGRAM(S): 500 TABLET, COATED ORAL at 02:01

## 2024-11-26 RX ADMIN — KETOROLAC TROMETHAMINE 30 MILLIGRAM(S): 30 INJECTION INTRAMUSCULAR; INTRAVENOUS at 14:09

## 2024-11-26 RX ADMIN — Medication 4: at 08:11

## 2024-11-26 RX ADMIN — ACETAMINOPHEN 500MG 650 MILLIGRAM(S): 500 TABLET, COATED ORAL at 02:25

## 2024-11-26 RX ADMIN — Medication 80 MILLIGRAM(S): at 13:56

## 2024-11-26 NOTE — DISCHARGE NOTE NURSING/CASE MANAGEMENT/SOCIAL WORK - FINANCIAL ASSISTANCE
[facility] provides services at a reduced cost to those who are determined to be eligible through [facility]’s financial assistance program. Information regarding [facility]’s financial assistance program can be found by going to [email or web address] or by calling [phone].

## 2024-11-26 NOTE — PROGRESS NOTE ADULT - ASSESSMENT
54F s/p cysto with right ureteral stent placement on 11/22/24     - IV Ertapenem based on ESBL E coli bcx  - F/u repeat cultures > BCx NGTD  - Appreciate ID recommendations - Discharge Tuesday w PO bactrim, fu duration  - Pain control prn  - Reg diet   - Incentive Spirometry  - OOB and ambulating as tolerated  - F/u AM labs  - DVT ppx      Discussed with Dr. Redd

## 2024-11-26 NOTE — DISCHARGE NOTE NURSING/CASE MANAGEMENT/SOCIAL WORK - NSDCPEFALRISK_GEN_ALL_CORE
Subjective:       Patient ID: Amita Trejo is a 70 y.o. female.    Chief Complaint: Follow-up      History of Present Illness  Chief complaint: SLL Lugano II    HPI: 67 y/o F w/ PMHx of ELI on CPAP, mitral valve prolapse, HTN, ADHD, neuropathy, DJD referred to [facility] for newly diagnosed stage II SLL    She had routine screening MMG 2/2021 that showed some mildly enlarged b/l axillary nodes. She had b/l axillary US with Dr Lugo 3/2021 most suggestive of inflammation. Repeat US of right axilla obtained 6/2021 that showed some borderline enlarged nodes with benign sonographic appearance. She had right axillary LN biopsy showing SLL/CLL    Colonoscopy Dr Kwan 2016, negative per pt      Interval history  Since last follow-up with us, patient had a robotic assisted left total hip arthroplasty done on 01/04/2024  Today, 02/15/2024, patient denies any acute concerns today.  She reports recovering from her surgery well without any significant side effects.  She continues to have physical therapy.  She denies any fevers, chills, drenching night sweats, decreased appetite or weight loss.    Past Medical History:   Diagnosis Date    Acid reflux     Arthritis     chronic lymphocytic leukemia     Hypertension     Primary osteoarthritis of left hip 01/04/2024    Renal cyst     Sleep apnea     non compliant with cpap       Past Surgical History:   Procedure Laterality Date    BREAST SURGERY Left 1991?    lumpectomy, NO Restrictions    CARPAL TUNNEL RELEASE      CYST REMOVAL      EPIDURAL STEROID INJECTION      HIP REPLACEMENT ARTHROPLASTY Left 01/04/2024    JOINT REPLACEMENT  07/21    Left knee    KNEE JOINT MANIPULATION Left 08/29/2022    Procedure: MANIPULATION, KNEE;  Surgeon: Sohan Bowens MD;  Location: [facility] OR;  Service: Orthopedics;  Laterality: Left;    REVISION OF KNEE ARTHROPLASTY Left 06/08/2022    Procedure: REVISION, ARTHROPLASTY, KNEE;  Surgeon: Sohan Bowens MD;  Location: [facility] OR;  Service: Orthopedics;   Laterality: Left;  FEMUR / PATHOLOGY / ASHLEY    ROBOTIC ARTHROPLASTY, HIP Left 2024    Procedure: ROBOTIC ARTHROPLASTY,HIP;  Surgeon: Isidro Barone MD;  Location: [facility] OR;  Service: Orthopedics;  Laterality: Left;    ROBOTIC ARTHROPLASTY, KNEE Right 2023    Procedure: ROBOTIC ARTHROPLASTY, KNEE, TOTAL;  Surgeon: Isidro Barone MD;  Location: [facility] OR;  Service: Orthopedics;  Laterality: Right;    SHOULDER SURGERY Right     SINUS SURGERY      x 2    SPINE SURGERY      Bulging disc in lower back    THYROID SURGERY      TONSILLECTOMY         Family History   Problem Relation Age of Onset    Hypertension Mother     COPD Mother     Hearing loss Mother     Hypertension Father     COPD Father     Hearing loss Father     Breast cancer Sister     Cancer Sister     Diabetes Maternal Grandmother         Reina    Diabetes Paternal Grandmother        Social History     Socioeconomic History    Marital status:    Tobacco Use    Smoking status: Former     Current packs/day: 0.00     Average packs/day: 1 pack/day for 10.0 years (10.0 ttl pk-yrs)     Types: Cigarettes     Start date: 3/18/1976     Quit date: 3/18/1986     Years since quittin.9    Smokeless tobacco: Former   Substance and Sexual Activity    Alcohol use: Not Currently    Drug use: Never    Sexual activity: Yes     Partners: Male     Birth control/protection: Post-menopausal       Current Outpatient Medications   Medication Sig Dispense Refill    amLODIPine (NORVASC) 5 MG tablet Take 1 tablet (5 mg total) by mouth every evening.      aspirin (ECOTRIN) 81 MG EC tablet Take 1 tablet (81 mg total) by mouth every 12 (twelve) hours. 84 tablet 0    B-complex with vitamin C (Z-BEC OR EQUIV) tablet Take 1 tablet by mouth once daily.      cholecalciferol, vitamin D3, 125 mcg (5,000 unit) capsule Take 5,000 Units by mouth once daily.      HYDROcodone-acetaminophen (NORCO) 5-325 mg per tablet Take 1 tablet by mouth every 6 (six) hours as needed  For information on Fall & Injury Prevention, visit: [email or web address]-[email or web address]-mobility OR  [email or web address]/[email or web address]-tips-[email or web address]-avoid-injury OR  https://www.cdc.gov/steadi/patient.html for Pain.      lisdexamfetamine (VYVANSE) 30 MG capsule Take 30 mg by mouth every morning.      metoprolol tartrate (LOPRESSOR) 50 MG tablet Take 75 mg by mouth once daily at 6am.      pantoprazole (PROTONIX) 40 MG tablet 40 mg once daily.      polysaccharide iron complex 200 mg iron Cap Take 1 capsule by mouth Daily.      pramipexole (MIRAPEX) 1.5 MG tablet Take 1.5 mg by mouth 2 (two) times a day.      telmisartan-hydrochlorothiazide (MICARDIS HCT) 40-12.5 mg per tablet Take 1 tablet by mouth once daily.      vitamin B complex (B COMPLEX 1 ORAL) Take 1 tablet by mouth once daily.       No current facility-administered medications for this visit.     Facility-Administered Medications Ordered in Other Visits   Medication Dose Route Frequency Provider Last Rate Last Admin    vancomycin (VANCOCIN) 1,000 mg in dextrose 5 % (D5W) 250 mL IVPB (Vial-Mate)  15 mg/kg Intravenous On Call Procedure Isidro Barone .7 mL/hr at 01/04/24 0848 1,000 mg at 01/04/24 1035       Review of patient's allergies indicates:   Allergen Reactions    Duloxetine      Other reaction(s): Bradycardia, Hypotension, Vomit    Lorazepam      Other reaction(s): Confusion  amnesia      Tramadol Other (See Comments)     nightmares         Labs:    02/07/2024 creatinine 0.92, albumin 4.0, LFTs unremarkable, uric acid 6.4, , WBC count 12.5, hemoglobin 10.8, MCV 92.8, platelet count 169, ANC 2.52.    04/25/2023: CMP unremarkable, WBC count 14.9, hemoglobin 12.1, MCV 94.7, ANC 3.86.    03/08/2023:  WBC count 24.2, hemoglobin 13.3, MCV 94, platelet count 259.  7/6/22 Cr 0.88, Alb 3.8, TP 7.1, , Uric acid 5.5, WBC 10.61, Hgb 10.5, , ANC 2.90, ALC 6.90  3/3/22 WBC 13.5 RBC 3.74 Hg 11.2 MCV 94 rdw 13.8  ANC 3.4 ALC 8.9 Cr 1.12 Alb 4.4 Ca 9.4   12/8/21 Cr 0.86, Alb 3.8, TP 7.0, Ca 9.7, AlkPhos 103, , Uric acid 5.9, WBC 13.21, Hgb 11.4, , Retic 1.43, ANC 4.41, ALC 7.73  9/21/21 Cr 1.16 Alb 3.9 TP 7.4 Ca 9.9  AlkPhos 92 AST 21 ALT 12  ferritin 122 WBC 9.82 Hg 11.1  ANC 3.35 ALC 4.91  7/21/21  Ferritin 138 Hep B core ab neg Hep B s ag neg Hep B s ab neg Hep C neg Uric acid 6.7 WBC 8.78 Hg 12.2  ANC 2.78 ALC 5.14 Direct Joao neg B2 micro 4.2 SPEP w/ ANGIE IgG 930 IgA 467 IgM 43 Abs kappa 43 Abs lambda 27.8 SFLC ratio 1.55 no M spike  1/18/20 Cr 0.89 Alb 3.9 TP 7.1 WBC 8.46 Hg 12.9 MCV 90.9  ANC 3.18 ALC 4.25    Imaging:  3/29/22 MMG prominent nodes in each axilla. Some nodes have minimally increased in size.     6/14/21 US axilla unilateral: 6 right axillary nodes, 2 are borderline enlarged measuring 2.4x1.7x1.3cm and 2.3x1.3x1.1cm. Benign sonographic appearance    2/18/21 CT N/C/A/P w/ contrast: no cervical adenopathy. Small thyroid nodules. No acute pulm disease. Small focus of nodular pleural thickening in fissure of left lung stable since 2019. Increased size of several borderline enlarged axillary LNs b/l in comparison to 2019 although this finding is nonspecific. Simple renal cysts on both kidneys increased in size compared to 2018 but not suspicious for malignancy    2/5/21 screening MMG: [id number]. Small LN in upper and outer quadrant of left breast increased slightly in size from prior exams. Multiple LN within each axilla larger and more dense than on prior exam. Lymphoma should be considered    Path:  6/16/21 right axillary LN biopsy: atypical lymphoproliferative process consistent with involvement by CLL/SLL.  Flow: abnormal B cells 82% of lymphs, CD19+, CD20+ dim, CD5+, CD23+ variable, CD11c partial+, CD38-, lambda +dim  FISH: del13q/-13 detected. del17p, t(11:14), trisomy 12, del11q and del6q negative.    Review of Systems  CONSTITUTIONAL: no fevers, no chills,no weight loss, no fatigue, no weakness  HEMATOLOGIC: no abnormal bleeding, no abnormal bruising, no drenching night sweats  ONCOLOGIC: no new masses or lumps  HEENT: no vision loss, no tinnitus or hearing loss, no nose  bleeding, no dysphagia, no odynophagia  CVS: no chest pain, no palpitations, no dyspnea on exertion  RESP: no shortness of breath, no hemoptysis, no cough  BREAST: no nipple discharge, no breast tenderness, no breast masses on self breast examination  GI: no nausea, no vomiting, no diarrhea, no constipation, no melena, no hematochezia, no hematemesis, no abdominal pain, no increase in abdominal girth  : no dysuria, no hematuria, no discharge  GYN: no abnormal vaginal bleeding, no dyspareunia, no vaginal discharge  INTEGUMENT: no rashes, no abnormal bruising, no nail pitting, no hyperpigmentation  NEURO: no falls, no memory loss, no paresthesias or dysesthesias, no urofecal incontinence or retention, no loss of strength on any extremity  MSK: +chronic lower back pain, no new joint pain but chronic b/l knee pain, Right knee replacement on 8/17/2023 with right knee swelling.  PSYCH: no suicidal or homicidal ideation, no depression, no insomnia, no anhedonia  ENDOCRINE: no heat or cold intolerance, no polyuria, no polydipsia        Objective:      Physical Exam  Vitals:    02/15/24 1345   BP: 128/73   Pulse: 74   Resp: 18   Temp: 97.9 °F (36.6 °C)           ECOG PS 1  GA: AAOx3, NAD  HEENT: NCAT, , EOMI, moist oral mucous membrane  LYMPH: no cervical, inguinal or supraclavicular adenopathy. Small b/l axillary LNs, largest about 2cm in right axilla, others around 1cm  CVS: s1s2 RRR, no M/R/G  RESP: CTA b/l, no crackles, no wheezes or rhonchi  ABD: soft, NT, ND, BS+, no hepatosplenomegaly  EXT: no deformities, b/l pitting pedal edema  SKIN: no rashes, no bruises or purpura, warm and dry  NEURO: normal mentation, strength, no sensory deficits    Assessment and Plan       Problem List Items Addressed This Visit          Oncology    Lymphoma, small lymphocytic - Primary         # Lymphoma, small lymphocytic    Findings are most consistent with SLL Lugano stage II vs CLL Juarez I. She does likely have peripheral blood  involvement as well  In any case, no anemia, no thrombocytopenia, no B symptoms, only 2 narciso stations involved (b/l axilla) and 13q del which confers good prognosis. No IGHV available but I do not think this is necessary at this time  Had systemic imaging with CT N/C/A/P w/ IV contrast 2/2021 without any bulky disease  No further workup needed at this time. Will observe every 12-16 weeks. If any indication arises, then she will also need PET CT and bone marrow biopsy      Plan   Reviewed labs, noted largely stable labs, noted mild normocytic anemia, likely postsurgical.  Follow-up in 4 months with above workup to discuss further recommendations.  Patient was advised to call our clinic if she were to have constitutional symptoms    A total of  30 minutes were spent in review of records, interpretation of test, coordination of care, discussion and counseling with the patient.        Portions of the record may have been created with voice recognition software. Occasional wrong-word or sound-a-like substitutions may have occurred due to the inherent limitations of voice recognition software.

## 2024-11-26 NOTE — PROGRESS NOTE ADULT - REASON FOR ADMISSION
Sepsis 2^ UTI, R ureteral calculus

## 2024-11-26 NOTE — PROGRESS NOTE ADULT - SUBJECTIVE AND OBJECTIVE BOX
54y Female    Meds:  ertapenem  IVPB 1000 milliGRAM(s) IV Intermittent every 24 hours  trimethoprim  160 mG/sulfamethoxazole 800 mG 1 Tablet(s) Oral two times a day    Allergies    No Known Allergies    Intolerances        VITALS:  Vital Signs Last 24 Hrs  T(C): 37.1 (26 Nov 2024 14:43), Max: 37.1 (26 Nov 2024 14:43)  T(F): 98.8 (26 Nov 2024 14:43), Max: 98.8 (26 Nov 2024 14:43)  HR: 75 (26 Nov 2024 14:43) (75 - 82)  BP: 112/73 (26 Nov 2024 14:43) (112/73 - 132/80)  BP(mean): --  RR: 18 (26 Nov 2024 14:43) (17 - 18)  SpO2: 98% (26 Nov 2024 14:43) (97% - 98%)    Parameters below as of 26 Nov 2024 14:43  Patient On (Oxygen Delivery Method): room air        LABS/DIAGNOSTIC TESTS:                          10.0   11.88 )-----------( 156      ( 25 Nov 2024 07:30 )             30.1         11-25    134[L]  |  108  |  31[H]  ----------------------------<  206[H]  4.8   |  23  |  1.35[H]    Ca    8.4      25 Nov 2024 07:30  Phos  3.3     11-25  Mg     2.3     11-25            CULTURES: .Blood BLOOD  11-24 @ 07:05   No growth at 48 Hours  --  --      .Blood BLOOD  11-24 @ 06:17   No growth at 48 Hours  --  --      .Blood BLOOD  11-21 @ 14:30   Growth in aerobic and anaerobic bottles: Escherichia coli ESBL  See previous culture [id number]  --    Growth in anaerobic bottle: Gram Negative Rods  Growth in aerobic bottle: Gram Negative Rods      .Blood BLOOD  11-21 @ 14:15   Growth in aerobic and anaerobic bottles: Escherichia coli ESBL  Direct identification is available within approximately 3-5  hours either by Blood Panel Multiplexed PCR or Direct  MALDI-TOF. Details: [email or web address]  --  Blood Culture PCR  Escherichia coli ESBL            RADIOLOGY:      ROS:  [  ] UNABLE TO ELICIT 54y Female who is doing well , she has no abdominal pain per say but still has some gas pains, she has some heaviness at the site of her sent but no fevers, chills, nausea , vomiting or diarrhea, no urinary symptoms. Her repeat blood cultures remain negative also. She got her Invanz dose for today also.    Meds:  ertapenem  IVPB 1000 milliGRAM(s) IV Intermittent every 24 hours  trimethoprim  160 mG/sulfamethoxazole 800 mG 1 Tablet(s) Oral two times a day    Allergies    No Known Allergies    Intolerances        VITALS:  Vital Signs Last 24 Hrs  T(C): 37.1 (26 Nov 2024 14:43), Max: 37.1 (26 Nov 2024 14:43)  T(F): 98.8 (26 Nov 2024 14:43), Max: 98.8 (26 Nov 2024 14:43)  HR: 75 (26 Nov 2024 14:43) (75 - 82)  BP: 112/73 (26 Nov 2024 14:43) (112/73 - 132/80)  BP(mean): --  RR: 18 (26 Nov 2024 14:43) (17 - 18)  SpO2: 98% (26 Nov 2024 14:43) (97% - 98%)    Parameters below as of 26 Nov 2024 14:43  Patient On (Oxygen Delivery Method): room air        LABS/DIAGNOSTIC TESTS:                          10.0   11.88 )-----------( 156      ( 25 Nov 2024 07:30 )             30.1         11-25    134[L]  |  108  |  31[H]  ----------------------------<  206[H]  4.8   |  23  |  1.35[H]    Ca    8.4      25 Nov 2024 07:30  Phos  3.3     11-25  Mg     2.3     11-25            CULTURES: .Blood BLOOD  11-24 @ 07:05   No growth at 48 Hours  --  --      .Blood BLOOD  11-24 @ 06:17   No growth at 48 Hours  --  --      .Blood BLOOD  11-21 @ 14:30   Growth in aerobic and anaerobic bottles: Escherichia coli ESBL  See previous culture [id number]  --    Growth in anaerobic bottle: Gram Negative Rods  Growth in aerobic bottle: Gram Negative Rods      .Blood BLOOD  11-21 @ 14:15   Growth in aerobic and anaerobic bottles: Escherichia coli ESBL  Direct identification is available within approximately 3-5  hours either by Blood Panel Multiplexed PCR or Direct  MALDI-TOF. Details: [email or web address]  --  Blood Culture PCR  Escherichia coli ESBL            RADIOLOGY:      ROS:  [  ] UNABLE TO ELICIT

## 2024-11-26 NOTE — PROGRESS NOTE ADULT - ASSESSMENT
UTI/ Pyelonephritis ( right sided)  Bacteremia - with ESBL E. Coli  Fever - resolved  Leukocytosis - decreasing.    Plan -  Can DC home today on Bactrim DS 1 tab po BID x 9 days more  reconsult prn.

## 2024-11-26 NOTE — PROGRESS NOTE ADULT - ATTENDING COMMENTS
54F s/p cysto with right ureteral stent placement on 11/22/24     - IV Ertapenem based on ESBL E coli bcx  - F/u repeat cultures > BCx NGTD  - Appreciate ID recommendations - Discharge Tuesday w PO bactrim, fu duration  - Pain control prn  - Reg diet   - Incentive Spirometry  - OOB and ambulating as tolerated  - F/u AM labs  - DVT ppx
54F s/p cysto with right ureteral stent placement on 11/22/24     - Pain control prn  - Reg diet   - IV Ertapenem based on prelim ESBL E coli bcx  - F/u repeat cultures  - Appreciate ID recommendations - Discharge Tuesday w PO bactrim  - Incentive Spirometry  - OOB and ambulating as tolerated  - F/u AM labs  - DVT ppx
54F s/p cysto with right ureteral stent placement on 11/22/24     - Pain control prn  - Reg diet   - IV Ertapenem based on prelim ESBL E coli bcx  - Repeat blood cultures Sunday per ID  - Appreciate ID recommendations - IV vs PO abx for discharge  - Incentive Spirometry  - OOB and ambulating as tolerated  - F/u AM labs  - DVT ppx

## 2024-11-27 RX ORDER — SULFAMETHOXAZOLE AND TRIMETHOPRIM 800; 160 MG/1; MG/1
800-160 TABLET ORAL TWICE DAILY
Qty: 14 | Refills: 0 | Status: ACTIVE | COMMUNITY
Start: 2024-11-27 | End: 1900-01-01

## 2024-11-29 LAB
CULTURE RESULTS: SIGNIFICANT CHANGE UP
CULTURE RESULTS: SIGNIFICANT CHANGE UP
SPECIMEN SOURCE: SIGNIFICANT CHANGE UP
SPECIMEN SOURCE: SIGNIFICANT CHANGE UP

## 2024-12-06 ENCOUNTER — APPOINTMENT (OUTPATIENT)
Dept: UROLOGY | Facility: CLINIC | Age: 54
End: 2024-12-06
Payer: COMMERCIAL

## 2024-12-06 VITALS
HEART RATE: 102 BPM | TEMPERATURE: 97.1 F | HEIGHT: 64 IN | SYSTOLIC BLOOD PRESSURE: 109 MMHG | WEIGHT: 139 LBS | OXYGEN SATURATION: 99 % | DIASTOLIC BLOOD PRESSURE: 72 MMHG | BODY MASS INDEX: 23.73 KG/M2

## 2024-12-06 DIAGNOSIS — N20.9 URINARY CALCULUS, UNSPECIFIED: ICD-10-CM

## 2024-12-06 PROCEDURE — 99214 OFFICE O/P EST MOD 30 MIN: CPT

## 2024-12-12 ENCOUNTER — OUTPATIENT (OUTPATIENT)
Dept: OUTPATIENT SERVICES | Facility: HOSPITAL | Age: 54
LOS: 1 days | End: 2024-12-12
Payer: COMMERCIAL

## 2024-12-12 VITALS
SYSTOLIC BLOOD PRESSURE: 135 MMHG | TEMPERATURE: 98 F | WEIGHT: 138.01 LBS | RESPIRATION RATE: 18 BRPM | DIASTOLIC BLOOD PRESSURE: 85 MMHG | HEART RATE: 90 BPM | HEIGHT: 63 IN | OXYGEN SATURATION: 99 %

## 2024-12-12 DIAGNOSIS — Z98.890 OTHER SPECIFIED POSTPROCEDURAL STATES: Chronic | ICD-10-CM

## 2024-12-12 DIAGNOSIS — N20.9 URINARY CALCULUS, UNSPECIFIED: ICD-10-CM

## 2024-12-12 DIAGNOSIS — E11.9 TYPE 2 DIABETES MELLITUS WITHOUT COMPLICATIONS: ICD-10-CM

## 2024-12-12 DIAGNOSIS — Z01.818 ENCOUNTER FOR OTHER PREPROCEDURAL EXAMINATION: ICD-10-CM

## 2024-12-12 NOTE — H&P PST ADULT - HISTORY OF PRESENT ILLNESS
54year old female with pmhx of hyperlipidemia, calculus of kidney, T2DM , Gout and newly diagnosed right breast cancer presents with c/o recurring urinary calculus  exhibited by severe right flank pain and nausea when she presented in ED for evaluation and was admitted x 6days last month . Patient is here today for presurgical testing for scheduled cystoscopy right ureteroscopy with laser lithotripsy and stent exchange 12/23/2024

## 2024-12-12 NOTE — H&P PST ADULT - PROBLEM SELECTOR PLAN 1
Patient is scheduled for cystoscopy, right ureteroscopy with laser lithotripsy and stent exchange on 12/23/2024  Written and oral preoperative instructions given to patient with understanding verbalized.    Instructions given to include using 4% chlorhexidine wash as directed starting 3days before day of surgery (inclusive of day of surgery)   Maintaining NPO status post midnight day before surgery   Stopping aspirin, NSAIDs, herbs, vitamins 7days before surgery    Patient is to expect a phone call day before surgery between 430-630pm, giving arrival time for surgery day.     Patient today with STOP bang score of 1, Low risk for MILES

## 2024-12-12 NOTE — H&P PST ADULT - NSICDXPASTMEDICALHX_GEN_ALL_CORE_FT
PAST MEDICAL HISTORY:  Breast CA     Diabetes     Gout     HLD (hyperlipidemia)     Kidney stone

## 2024-12-12 NOTE — H&P PST ADULT - NSICDXPROCEDURE_GEN_ALL_CORE_FT
PROCEDURES:  Cystoureteroscopy, with holmium laser lithotripsy and stent insertion 12-Dec-2024 09:15:46  Lo Wong

## 2024-12-12 NOTE — H&P PST ADULT - PROBLEM SELECTOR PLAN 2
Current treatment regimen for diabetes - Xigduo 100/1000mg and Glipizide 5mtg daily  Patient instructed with understanding verbalized to HOLD Glipizide the day of surgery   Patient also instructed with understanding verbalized to HOLD Xigduo 3days (12/19/2024) before surgery to prevent postoperative euglycemic Ketoacidosis.   Last Hgb A1C not known, will obtain from PCP   Fingerstick STAT AM day of surgery ordered   Follow up with PCP/Endocrinologist postoperatively

## 2024-12-13 PROCEDURE — G0463: CPT

## 2024-12-19 RX ORDER — SULFAMETHOXAZOLE AND TRIMETHOPRIM 800; 160 MG/1; MG/1
800-160 TABLET ORAL TWICE DAILY
Qty: 10 | Refills: 0 | Status: ACTIVE | COMMUNITY
Start: 2024-12-19 | End: 1900-01-01

## 2024-12-20 ENCOUNTER — TRANSCRIPTION ENCOUNTER (OUTPATIENT)
Age: 54
End: 2024-12-20

## 2024-12-23 ENCOUNTER — TRANSCRIPTION ENCOUNTER (OUTPATIENT)
Age: 54
End: 2024-12-23

## 2024-12-23 ENCOUNTER — APPOINTMENT (OUTPATIENT)
Dept: UROLOGY | Facility: HOSPITAL | Age: 54
End: 2024-12-23

## 2024-12-23 ENCOUNTER — OUTPATIENT (OUTPATIENT)
Dept: OUTPATIENT SERVICES | Facility: HOSPITAL | Age: 54
LOS: 1 days | End: 2024-12-23
Payer: COMMERCIAL

## 2024-12-23 VITALS
WEIGHT: 138.01 LBS | SYSTOLIC BLOOD PRESSURE: 147 MMHG | TEMPERATURE: 98 F | OXYGEN SATURATION: 100 % | HEIGHT: 63 IN | DIASTOLIC BLOOD PRESSURE: 68 MMHG | HEART RATE: 85 BPM | RESPIRATION RATE: 16 BRPM

## 2024-12-23 VITALS
DIASTOLIC BLOOD PRESSURE: 75 MMHG | HEART RATE: 76 BPM | OXYGEN SATURATION: 98 % | TEMPERATURE: 98 F | RESPIRATION RATE: 18 BRPM | SYSTOLIC BLOOD PRESSURE: 114 MMHG

## 2024-12-23 DIAGNOSIS — N20.9 URINARY CALCULUS, UNSPECIFIED: ICD-10-CM

## 2024-12-23 DIAGNOSIS — Z98.890 OTHER SPECIFIED POSTPROCEDURAL STATES: Chronic | ICD-10-CM

## 2024-12-23 LAB
GLUCOSE BLDC GLUCOMTR-MCNC: 185 MG/DL — HIGH (ref 70–99)
GLUCOSE BLDC GLUCOMTR-MCNC: 261 MG/DL — HIGH (ref 70–99)

## 2024-12-23 PROCEDURE — C1758: CPT

## 2024-12-23 PROCEDURE — C1769: CPT

## 2024-12-23 PROCEDURE — C1889: CPT

## 2024-12-23 PROCEDURE — 52332 CYSTOSCOPY AND TREATMENT: CPT | Mod: RT

## 2024-12-23 PROCEDURE — 52351 CYSTOURETERO & OR PYELOSCOPE: CPT | Mod: RT

## 2024-12-23 PROCEDURE — C2617: CPT

## 2024-12-23 PROCEDURE — 82962 GLUCOSE BLOOD TEST: CPT

## 2024-12-23 PROCEDURE — 74420 UROGRAPHY RTRGR +-KUB: CPT | Mod: 26,RT

## 2024-12-23 PROCEDURE — C1747: CPT

## 2024-12-23 PROCEDURE — 76000 FLUOROSCOPY <1 HR PHYS/QHP: CPT

## 2024-12-23 DEVICE — URETERAL SHEATH NAVIGATOR HD 12/14FR X 28CM: Type: IMPLANTABLE DEVICE | Status: FUNCTIONAL

## 2024-12-23 DEVICE — GUIDEWIRE SENSOR DUAL-FLEX NITINOL STRAIGHT .038" X 150CM: Type: IMPLANTABLE DEVICE | Status: FUNCTIONAL

## 2024-12-23 DEVICE — URETERAL CATH OPEN END FLEXI-TIP 5FR .038" X 70CM: Type: IMPLANTABLE DEVICE | Status: FUNCTIONAL

## 2024-12-23 DEVICE — URETEROSCOPE LITHOVUE DISP: Type: IMPLANTABLE DEVICE | Status: FUNCTIONAL

## 2024-12-23 DEVICE — URETERAL STENT PERCUFLEX PLUS 6FR 24CM: Type: IMPLANTABLE DEVICE | Status: FUNCTIONAL

## 2024-12-23 RX ORDER — DAPAGLIFLOZIN AND METFORMIN HYDROCHLORIDE 10; 1000 MG/1; MG/1
1 TABLET, FILM COATED, EXTENDED RELEASE ORAL
Refills: 0 | DISCHARGE

## 2024-12-23 RX ORDER — SODIUM CHLORIDE 9 MG/ML
3 INJECTION, SOLUTION INTRAMUSCULAR; INTRAVENOUS; SUBCUTANEOUS EVERY 8 HOURS
Refills: 0 | Status: DISCONTINUED | OUTPATIENT
Start: 2024-12-23 | End: 2024-12-23

## 2024-12-23 RX ORDER — HYDROMORPHONE HCL 4 MG
0.5 TABLET ORAL
Refills: 0 | Status: DISCONTINUED | OUTPATIENT
Start: 2024-12-23 | End: 2024-12-23

## 2024-12-23 RX ORDER — ONDANSETRON 4 MG/1
4 TABLET ORAL ONCE
Refills: 0 | Status: DISCONTINUED | OUTPATIENT
Start: 2024-12-23 | End: 2024-12-23

## 2024-12-23 RX ORDER — SODIUM CHLORIDE 9 MG/ML
1000 INJECTION, SOLUTION INTRAVENOUS
Refills: 0 | Status: DISCONTINUED | OUTPATIENT
Start: 2024-12-23 | End: 2024-12-23

## 2024-12-23 RX ORDER — HYDROMORPHONE HCL 4 MG
1 TABLET ORAL
Refills: 0 | Status: DISCONTINUED | OUTPATIENT
Start: 2024-12-23 | End: 2024-12-23

## 2024-12-23 RX ORDER — ROSUVASTATIN CALCIUM 5 MG/1
1 TABLET, FILM COATED ORAL
Refills: 0 | DISCHARGE

## 2024-12-23 RX ORDER — GLIPIZIDE 5 MG/1
1 TABLET, FILM COATED, EXTENDED RELEASE ORAL
Refills: 0 | DISCHARGE

## 2024-12-23 RX ORDER — MEROPENEM 1 G/20ML
1000 INJECTION, POWDER, FOR SOLUTION INTRAVENOUS ONCE
Refills: 0 | Status: COMPLETED | OUTPATIENT
Start: 2024-12-23 | End: 2024-12-23

## 2024-12-23 RX ADMIN — MEROPENEM 100 MILLIGRAM(S): 1 INJECTION, POWDER, FOR SOLUTION INTRAVENOUS at 07:53

## 2024-12-23 RX ADMIN — SODIUM CHLORIDE 3 MILLILITER(S): 9 INJECTION, SOLUTION INTRAMUSCULAR; INTRAVENOUS; SUBCUTANEOUS at 07:52

## 2024-12-23 NOTE — ASU DISCHARGE PLAN (ADULT/PEDIATRIC) - FINANCIAL ASSISTANCE
[facility] provides services at a reduced cost to those who are determined to be eligible through [facility]’s financial assistance program. Information regarding [facility]’s financial assistance program can be found by going to [email or web address] or by calling [phone].

## 2024-12-23 NOTE — ASU PATIENT PROFILE, ADULT - FALL HARM RISK - UNIVERSAL INTERVENTIONS
Bed in lowest position, wheels locked, appropriate side rails in place/Call bell, personal items and telephone in reach/Instruct patient to call for assistance before getting out of bed or chair/Non-slip footwear when patient is out of bed/[place] to call system/Physically safe environment - no spills, clutter or unnecessary equipment/Purposeful Proactive Rounding/Room/bathroom lighting operational, light cord in reach

## 2024-12-23 NOTE — ASU DISCHARGE PLAN (ADULT/PEDIATRIC) - NS MD DC FALL RISK RISK
For information on Fall & Injury Prevention, visit: [email or web address]/[email or web address]-[email or web address]-maintains-health-[email or web address]-mobility OR  [email or web address]/[email or web address]-[email or web address]-tips-[email or web address]-injury OR  https://www.cdc.gov/steadi/patient.html

## 2024-12-23 NOTE — ASU DISCHARGE PLAN (ADULT/PEDIATRIC) - ASU DC SPECIAL INSTRUCTIONSFT
STENT: You may have an internal stent (a hollow tube that runs from the kidney to your bladder) after your procedure, which helps urine drain from the kidney to your bladder. Some patients experience urinary frequency, burning, or even back pain (especially with urination). These sensations will gradually get better. Increasing your fluid intake can also improve these symptoms. While the stent is in place, your urine may continue to be bloody. This stent is temporary and must be removed by your urologist as an outpatient with in 3 months unless otherwise specified. If your stent is on a string, it is secured to your leg or genitalia with an adhesive bandage. Do not pull on the string, do not remove the bandage, do not insert anything intravaginally/intraurethrally, and do not engage in sexual intercourse until after the stent is removed at your post-operative appointment.  GENERAL: It is common to have blood in your urine after your procedure. It may be pink or even red; inform your doctor if you have a significant amount of clot in the urine or if you are unable to void at all. The urine may clear and then become bloody again especially as you are more physically active.  BATHING: You may shower or bathe.  DIET: You may resume your regular diet and regular medication regimen.  PAIN: You may take Tylenol (acetaminophen) 650-975mg and/or Motrin (ibuprofen) 400-600mg, both available over the counter, for pain every 6 hours as needed. Do not exceed 4000mg of Tylenol (acetaminophen) daily. You may alternate these medications such that you take one or the other every 3 hours for around the clock pain coverage. If you have a stent, the following medications may have been sent to your pharmacy for stent related discomfort: Flomax (tamsulosin) 0.4mg at bedtime until stent removed  ANTIBIOTICS: Please finish your course of Bactrim as prescribed.  STOOL SOFTENERS: Do not allow yourself to become constipated as straining may cause bleeding. Take stool softeners or a laxative (ex. Miralax, Colace, Senokot, ExLax, etc), available over the counter, if needed.  ACTIVITY: No heavy lifting or strenuous exercise until you are evaluated at your post-operative appointment. Otherwise, you may return to your usual level of physical activity.  ANTICOAGULATION: If you are taking any blood thinning medications, please discuss with your urologist prior to restarting these medications unless otherwise specified.  FOLLOW-UP: If you did not already schedule your post-operative appointment, please call your urologist to schedule a follow-up appointment.  CALL YOUR UROLOGIST IF: You have any bleeding that does not stop, inability to void >8 hours, fever over 100.4 F, chills, persistent nausea/vomiting, changes in your incision concerning for infection, or if your pain is not controlled on your discharge pain medications. STENT: You may have an internal stent (a hollow tube that runs from the kidney to your bladder) after your procedure, which helps urine drain from the kidney to your bladder. Some patients experience urinary frequency, burning, or even back pain (especially with urination). These sensations will gradually get better. Increasing your fluid intake can also improve these symptoms. While the stent is in place, your urine may continue to be bloody. This stent is temporary and must be removed by your urologist as an outpatient with in 3 months unless otherwise specified. If your stent is on a string, it is secured to your leg or genitalia with an adhesive bandage.     ***You may remove the adhesive and pull the string to take the stent out on your own on Tuesday 12/24, or if you are uncomfortable doing this on your own, you may come to Dr. Redd's office on Thursday 12/26 to have it removed in the office. Nothing intravaginally while stent is still in.***    GENERAL: It is common to have blood in your urine after your procedure. It may be pink or even red; inform your doctor if you have a significant amount of clot in the urine or if you are unable to void at all. The urine may clear and then become bloody again especially as you are more physically active.  BATHING: You may shower or bathe.  DIET: You may resume your regular diet and regular medication regimen.  PAIN: You may take Tylenol (acetaminophen) 650-975mg and/or Motrin (ibuprofen) 400-600mg, both available over the counter, for pain every 6 hours as needed. Do not exceed 4000mg of Tylenol (acetaminophen) daily. You may alternate these medications such that you take one or the other every 3 hours for around the clock pain coverage. If you have a stent, the following medications may have been sent to your pharmacy for stent related discomfort: Flomax (tamsulosin) 0.4mg at bedtime until stent removed    ***ANTIBIOTICS: Please finish your course of Bactrim as prescribed.***    STOOL SOFTENERS: Do not allow yourself to become constipated as straining may cause bleeding. Take stool softeners or a laxative (ex. Miralax, Colace, Senokot, ExLax, etc), available over the counter, if needed.  ACTIVITY: No heavy lifting or strenuous exercise until you are evaluated at your post-operative appointment. Otherwise, you may return to your usual level of physical activity.  ANTICOAGULATION: If you are taking any blood thinning medications, please discuss with your urologist prior to restarting these medications unless otherwise specified.  FOLLOW-UP: If you did not already schedule your post-operative appointment, please call your urologist to schedule a follow-up appointment.  CALL YOUR UROLOGIST IF: You have any bleeding that does not stop, inability to void >8 hours, fever over 100.4 F, chills, persistent nausea/vomiting, changes in your incision concerning for infection, or if your pain is not controlled on your discharge pain medications.    Dr. Redd's office will reach out regarding follow up.

## 2024-12-23 NOTE — ASU DISCHARGE PLAN (ADULT/PEDIATRIC) - CARE PROVIDER_API CALL
Gorge Redd  Urology  [address] [place] [id number]  Phone: [phone]  Fax: [phone]  Follow Up Time:

## 2024-12-24 ENCOUNTER — RX RENEWAL (OUTPATIENT)
Age: 54
End: 2024-12-24

## 2025-01-21 NOTE — PROGRESS NOTE ADULT - SUBJECTIVE AND OBJECTIVE BOX
Continue current meds   Subjective:  Resting comfortably    Objective:    Vital signs  T(C): , Max: 36.8 (11-25-24 @ 12:15)  HR: 80 (11-26-24 @ 05:09)  BP: 118/75 (11-26-24 @ 05:09)  SpO2: 97% (11-26-24 @ 05:09)  Wt(kg): --    Output       Gen: NAD  Abd: soft, nontender, nondistended  : voiding independently    Labs                        10.0   11.88 )-----------( 156      ( 25 Nov 2024 07:30 )             30.1     25 Nov 2024 07:30    134    |  108    |  31     ----------------------------<  206    4.8     |  23     |  1.35     Ca    8.4        25 Nov 2024 07:30  Phos  3.3       25 Nov 2024 07:30  Mg     2.3       25 Nov 2024 07:30        Urine Cx: ?  Blood Cx: ?      Imaging

## 2025-01-24 PROBLEM — C50.919 MALIGNANT NEOPLASM OF UNSPECIFIED SITE OF UNSPECIFIED FEMALE BREAST: Chronic | Status: ACTIVE | Noted: 2024-12-12

## 2025-02-06 ENCOUNTER — APPOINTMENT (OUTPATIENT)
Dept: PLASTIC SURGERY | Facility: CLINIC | Age: 55
End: 2025-02-06
Payer: COMMERCIAL

## 2025-02-06 VITALS
RESPIRATION RATE: 16 BRPM | TEMPERATURE: 98.1 F | OXYGEN SATURATION: 99 % | HEART RATE: 93 BPM | BODY MASS INDEX: 23.73 KG/M2 | WEIGHT: 139 LBS | HEIGHT: 64 IN | SYSTOLIC BLOOD PRESSURE: 159 MMHG | DIASTOLIC BLOOD PRESSURE: 80 MMHG

## 2025-02-06 DIAGNOSIS — C50.211 MALIGNANT NEOPLASM OF UPPER-INNER QUADRANT OF RIGHT FEMALE BREAST: ICD-10-CM

## 2025-02-06 DIAGNOSIS — Z17.0 MALIGNANT NEOPLASM OF UPPER-INNER QUADRANT OF RIGHT FEMALE BREAST: ICD-10-CM

## 2025-02-06 PROCEDURE — 99204 OFFICE O/P NEW MOD 45 MIN: CPT

## 2025-02-14 ENCOUNTER — OUTPATIENT (OUTPATIENT)
Dept: OUTPATIENT SERVICES | Facility: HOSPITAL | Age: 55
LOS: 1 days | End: 2025-02-14
Payer: COMMERCIAL

## 2025-02-14 ENCOUNTER — RESULT REVIEW (OUTPATIENT)
Age: 55
End: 2025-02-14

## 2025-02-14 ENCOUNTER — APPOINTMENT (OUTPATIENT)
Dept: MAMMOGRAPHY | Facility: IMAGING CENTER | Age: 55
End: 2025-02-14
Payer: COMMERCIAL

## 2025-02-14 ENCOUNTER — APPOINTMENT (OUTPATIENT)
Dept: ULTRASOUND IMAGING | Facility: IMAGING CENTER | Age: 55
End: 2025-02-14

## 2025-02-14 DIAGNOSIS — C50.211 MALIGNANT NEOPLASM OF UPPER-INNER QUADRANT OF RIGHT FEMALE BREAST: ICD-10-CM

## 2025-02-14 DIAGNOSIS — Z98.890 OTHER SPECIFIED POSTPROCEDURAL STATES: Chronic | ICD-10-CM

## 2025-02-14 PROCEDURE — 77066 DX MAMMO INCL CAD BI: CPT

## 2025-02-14 PROCEDURE — 76641 ULTRASOUND BREAST COMPLETE: CPT | Mod: 26,50

## 2025-02-14 PROCEDURE — G0279: CPT

## 2025-02-14 PROCEDURE — 77066 DX MAMMO INCL CAD BI: CPT | Mod: 26

## 2025-02-14 PROCEDURE — 76641 ULTRASOUND BREAST COMPLETE: CPT

## 2025-02-14 PROCEDURE — G0279: CPT | Mod: 26

## 2025-02-20 ENCOUNTER — APPOINTMENT (OUTPATIENT)
Dept: PLASTIC SURGERY | Facility: CLINIC | Age: 55
End: 2025-02-20

## 2025-02-20 VITALS
BODY MASS INDEX: 24.75 KG/M2 | DIASTOLIC BLOOD PRESSURE: 83 MMHG | OXYGEN SATURATION: 99 % | SYSTOLIC BLOOD PRESSURE: 160 MMHG | WEIGHT: 145 LBS | TEMPERATURE: 98 F | HEIGHT: 64 IN | HEART RATE: 91 BPM

## 2025-02-20 DIAGNOSIS — C50.211 MALIGNANT NEOPLASM OF UPPER-INNER QUADRANT OF RIGHT FEMALE BREAST: ICD-10-CM

## 2025-02-20 DIAGNOSIS — Z17.0 MALIGNANT NEOPLASM OF UPPER-INNER QUADRANT OF RIGHT FEMALE BREAST: ICD-10-CM

## 2025-02-20 PROCEDURE — 99213 OFFICE O/P EST LOW 20 MIN: CPT

## 2025-02-22 ENCOUNTER — APPOINTMENT (OUTPATIENT)
Dept: CT IMAGING | Facility: IMAGING CENTER | Age: 55
End: 2025-02-22
Payer: COMMERCIAL

## 2025-02-22 ENCOUNTER — OUTPATIENT (OUTPATIENT)
Dept: OUTPATIENT SERVICES | Facility: HOSPITAL | Age: 55
LOS: 1 days | End: 2025-02-22
Payer: COMMERCIAL

## 2025-02-22 DIAGNOSIS — C50.211 MALIGNANT NEOPLASM OF UPPER-INNER QUADRANT OF RIGHT FEMALE BREAST: ICD-10-CM

## 2025-02-22 DIAGNOSIS — Z00.8 ENCOUNTER FOR OTHER GENERAL EXAMINATION: ICD-10-CM

## 2025-02-22 DIAGNOSIS — Z98.890 OTHER SPECIFIED POSTPROCEDURAL STATES: Chronic | ICD-10-CM

## 2025-02-22 PROCEDURE — 74174 CTA ABD&PLVS W/CONTRAST: CPT | Mod: 26

## 2025-02-22 PROCEDURE — 74174 CTA ABD&PLVS W/CONTRAST: CPT

## 2025-03-06 ENCOUNTER — OUTPATIENT (OUTPATIENT)
Dept: OUTPATIENT SERVICES | Facility: HOSPITAL | Age: 55
LOS: 1 days | End: 2025-03-06

## 2025-03-06 VITALS
WEIGHT: 139.99 LBS | HEART RATE: 89 BPM | RESPIRATION RATE: 16 BRPM | OXYGEN SATURATION: 99 % | HEIGHT: 63 IN | SYSTOLIC BLOOD PRESSURE: 129 MMHG | DIASTOLIC BLOOD PRESSURE: 80 MMHG | TEMPERATURE: 98 F

## 2025-03-06 DIAGNOSIS — C50.211 MALIGNANT NEOPLASM OF UPPER-INNER QUADRANT OF RIGHT FEMALE BREAST: ICD-10-CM

## 2025-03-06 DIAGNOSIS — Z98.890 OTHER SPECIFIED POSTPROCEDURAL STATES: Chronic | ICD-10-CM

## 2025-03-06 DIAGNOSIS — E11.9 TYPE 2 DIABETES MELLITUS WITHOUT COMPLICATIONS: ICD-10-CM

## 2025-03-06 DIAGNOSIS — I10 ESSENTIAL (PRIMARY) HYPERTENSION: ICD-10-CM

## 2025-03-06 DIAGNOSIS — C50.411 MALIGNANT NEOPLASM OF UPPER-OUTER QUADRANT OF RIGHT FEMALE BREAST: ICD-10-CM

## 2025-03-06 DIAGNOSIS — Z96.0 PRESENCE OF UROGENITAL IMPLANTS: Chronic | ICD-10-CM

## 2025-03-06 LAB
A1C WITH ESTIMATED AVERAGE GLUCOSE RESULT: 8.4 % — HIGH (ref 4–5.6)
BASOPHILS # BLD AUTO: 0.11 K/UL — SIGNIFICANT CHANGE UP (ref 0–0.2)
BASOPHILS NFR BLD AUTO: 1 % — SIGNIFICANT CHANGE UP (ref 0–2)
BLD GP AB SCN SERPL QL: NEGATIVE — SIGNIFICANT CHANGE UP
EOSINOPHIL # BLD AUTO: 0.21 K/UL — SIGNIFICANT CHANGE UP (ref 0–0.5)
EOSINOPHIL NFR BLD AUTO: 1.9 % — SIGNIFICANT CHANGE UP (ref 0–6)
ESTIMATED AVERAGE GLUCOSE: 194 — SIGNIFICANT CHANGE UP
HCT VFR BLD CALC: 38.9 % — SIGNIFICANT CHANGE UP (ref 34.5–45)
HGB BLD-MCNC: 12.4 G/DL — SIGNIFICANT CHANGE UP (ref 11.5–15.5)
IMM GRANULOCYTES NFR BLD AUTO: 0.5 % — SIGNIFICANT CHANGE UP (ref 0–0.9)
LYMPHOCYTES # BLD AUTO: 4.45 K/UL — HIGH (ref 1–3.3)
LYMPHOCYTES # BLD AUTO: 40.7 % — SIGNIFICANT CHANGE UP (ref 13–44)
MCHC RBC-ENTMCNC: 26.9 PG — LOW (ref 27–34)
MCHC RBC-ENTMCNC: 31.9 G/DL — LOW (ref 32–36)
MCV RBC AUTO: 84.4 FL — SIGNIFICANT CHANGE UP (ref 80–100)
MONOCYTES # BLD AUTO: 0.6 K/UL — SIGNIFICANT CHANGE UP (ref 0–0.9)
MONOCYTES NFR BLD AUTO: 5.5 % — SIGNIFICANT CHANGE UP (ref 2–14)
NEUTROPHILS # BLD AUTO: 5.51 K/UL — SIGNIFICANT CHANGE UP (ref 1.8–7.4)
NEUTROPHILS NFR BLD AUTO: 50.4 % — SIGNIFICANT CHANGE UP (ref 43–77)
PLATELET # BLD AUTO: 355 K/UL — SIGNIFICANT CHANGE UP (ref 150–400)
RBC # BLD: 4.61 M/UL — SIGNIFICANT CHANGE UP (ref 3.8–5.2)
RBC # FLD: 12.9 % — SIGNIFICANT CHANGE UP (ref 10.3–14.5)
RH IG SCN BLD-IMP: POSITIVE — SIGNIFICANT CHANGE UP
RH IG SCN BLD-IMP: POSITIVE — SIGNIFICANT CHANGE UP
WBC # BLD: 10.94 K/UL — HIGH (ref 3.8–10.5)
WBC # FLD AUTO: 10.94 K/UL — HIGH (ref 3.8–10.5)

## 2025-03-06 NOTE — H&P PST ADULT - ATTENDING COMMENTS
54-year-old lady.    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  Her extent of disease evaluation is unremarkable.    Management options were reviewed.  She is elected to have a therapeutic right, and prophylactic left mastectomy, with bilateral axillary sentinel node biopsies, and autologous tissue reconstruction by Dr. Roddy Riddle.    Our conversations have included discussions regarding the oncologic concerns, operative approach, risk, benefits, alternatives, possible surgical outcomes.    All of her questions were answered.    Consent on chart.

## 2025-03-06 NOTE — H&P PST ADULT - NEGATIVE MUSCULOSKELETAL SYMPTOMS
[de-identified] : CROW RILEY is a 33 year y/o F with PMH of PCOS, migraines who presents as a new patient today to establish care. CC fatigue, bloating  #Feels Bloated Bloated - clothes feel uncomfortable, interfering with sleep Making an effort to eat healthier Pt's mom said she gets it from her mother Feels like after eating anything Only doesn't happen with plain greens Uncomfortable Has tried powdered greens for gut health, vitamin pills to take prior to eating No N/V, abd pain BMs are fine, notes constipation over the holidays 2/2 diet Experiences bloating even when having good BMs  #Fatigue Feels exhausted despite sleeping well Comes and goes, has been more prevalent for the last few months Has a therapist  #Migraines Takes Mg qhs Manages with diet and exercise Now mostly triggered by atmosphere Previously were triggered by certain foods or being sedentary Has had aura in the past but not recently, gets pounding HA Can't work, photophobia and phonophobia Since age 9 Feels like they are managed well with ibuprofen, caffeine HAs talked about Nyrtec before  PMH: follows with Derm, migraines less frequent than in the past, ~1 per month, PCOS PSH: none Allergies: shellfish (developed at age 20, worsening) Medications: sprintec, ?dapsone temporarily for Derm to decrease oil production, Mg. Ibuprofen PRN Fam Hx: m gpa blood clotting disorder, both parents work in medicine. Sisters have been tested for clotting disorders and do not have it. m aunt and gma BCA, allergies, anxiety both sides. 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  Tobacco use: never Alcohol use: rarely, triggers migraines. 3-5 drinks per month Drug use: marijuana rarely Sexually active: Y, doesn't need testing Diet & Exercise: pilates, HIIT, weights Mood: ok, gpt  over the holidays, was leaving a contract job at that time. Now feels like she can finally breathe, notes decreased anxiety now. Firearms: N  #Health Maintenance Flu shot: UTD Covid vaccine: UTD Tdap: thinks UTD Pap: UTD, got one last year, no h/o abn STI screen: not today Family Planning: periods are ok, since on Sprintec has made PCOS manageable. Worst is mood symptoms.  no arthralgia/no arthritis/no joint swelling/no myalgia/no muscle cramps

## 2025-03-06 NOTE — H&P PST ADULT - GENITOURINARY COMMENTS
Hx Sepsis  secondary to  UTI, Right ureteral calculus Hx Sepsis  secondary to  UTI, Right ureteral calculus s/p cystoscopy right ureteroscopy with laser lithotripsy and stent exchange on 12/23/2024

## 2025-03-06 NOTE — H&P PST ADULT - ENDOCRINE COMMENTS
Hx T2DM on Glipizide, Tradjenta and XIGDUO.  Fasting -180. Last A1c in NOV was 10.1 Hx T2DM on Glipizide, Tradjenta and XIGDUO.  Fasting -180. Last A1c in NOV was 10.1. repeat A1c in PST

## 2025-03-06 NOTE — H&P PST ADULT - PROBLEM SELECTOR PLAN 1
Patient tentatively scheduled for    Pre-op instructions provided.  Famotidine provided with instructions. Hibiclens provided with instructions and was signed by patient. Teach-back method was utilized to assess patient's understanding. Patient verbalized understanding.    ordered CBC, A1c Type and ABO    copy of BMP in the chart

## 2025-03-06 NOTE — H&P PST ADULT - PROBLEM SELECTOR PLAN 2
Patient was instructed hold the Xigduo 3 days prior to procedure, Last dose 3/7/25    Patient instructed to hold glipizide and Tradjenta the morning of procedure. Pt stated understanding.

## 2025-03-06 NOTE — H&P PST ADULT - HISTORY OF PRESENT ILLNESS
54year old female with pmhx of hyperlipidemia, calculus of kidney, T2DM , Gout and newly diagnosed right breast cancer. Patient is scheduled for bilateral mastectomies bilateral axillary sentinel lymph node biopsy    Dr clay-reconstruction bilateral breast breast with deep inferior epigastric  flap, possible internal mammary lymph node biopsies with possible christin resection possible mesh placement , possible nerve graft , possible Alloderm     Patient reports palpating a lump on self-exam August 2024, evaluated by PCP, underwent diagnostic b/l mammo/sono 10/18/24, notable  hypoechoic mass. She underwent right breast sonogram guided core needle biopsy    presents with c/o recurring urinary calculus  exhibited by severe right flank pain and nausea when she presented in ED for evaluation and was admitted x 6 days in November at [facility] s/p cystoscopy right ureteroscopy with laser lithotripsy and stent exchange on 12/23/2024 54year old female with pmhx of hyperlipidemia, calculus of kidney, T2DM , Gout and newly diagnosed right breast cancer. Patient is scheduled for bilateral mastectomies bilateral axillary sentinel lymph node biopsy    Dr clay-reconstruction bilateral breast breast with deep inferior epigastric  flap, possible internal mammary lymph node biopsies with possible christin resection possible mesh placement , possible nerve graft , possible Alloderm     Patient reports palpating a lump on self-exam August 2024, evaluated by PCP, underwent diagnostic b/l mammo/sono 10/18/24, notable  hypoechoic mass. She underwent right breast sonogram guided core needle biopsy    patient went to  ED on Nov 2024 and was admitted x 6 days at [facility]  with sepsis UTI secondary to kidney stone s/p cystoscopy right ureteroscopy with laser lithotripsy and stent exchange on 12/23/2024

## 2025-03-06 NOTE — H&P PST ADULT - NSICDXFAMILYHX_GEN_ALL_CORE_FT
FAMILY HISTORY:  Family history of gout    Father  Still living? Unknown  FH: type 2 diabetes, Age at diagnosis: Age Unknown  FHx: leukemia, Age at diagnosis: Age Unknown

## 2025-03-06 NOTE — H&P PST ADULT - NSICDXPASTSURGICALHX_GEN_ALL_CORE_FT
PAST SURGICAL HISTORY:   Section     History of lithotripsy     S/P cystoscopy with ureteral stent placement

## 2025-03-06 NOTE — H&P PST ADULT - NSICDXPASTMEDICALHX_GEN_ALL_CORE_FT
PAST MEDICAL HISTORY:  Breast CA     Diabetes     Gout     HLD (hyperlipidemia)     Hypertension     Kidney stone     Malignant neoplasm of upper-outer quadrant of right breast     Sepsis

## 2025-03-10 PROBLEM — I10 ESSENTIAL (PRIMARY) HYPERTENSION: Chronic | Status: ACTIVE | Noted: 2025-03-06

## 2025-03-10 PROBLEM — A41.9 SEPSIS, UNSPECIFIED ORGANISM: Chronic | Status: ACTIVE | Noted: 2025-03-06

## 2025-03-10 PROBLEM — C50.411 MALIGNANT NEOPLASM OF UPPER-OUTER QUADRANT OF RIGHT FEMALE BREAST: Chronic | Status: ACTIVE | Noted: 2025-03-06

## 2025-03-10 NOTE — ASU PATIENT PROFILE, ADULT - NS PRO LAST MENSTRUAL
Time reflects when diagnosis was documented in both MDM as applicable and the Disposition within this note       Time User Action Codes Description Comment    12/3/2024  9:25 AM Pilo Fam [J36] Peritonsillar abscess           ED Disposition       ED Disposition   Admit    Condition   Stable    Date/Time   Tue Dec 3, 2024  9:25 AM    Comment   Case was discussed with DR. Bruce and the patient's admission status was agreed to be Admission Status: observation status to the service of Dr. Bruce .               Assessment & Plan       Medical Decision Making  39-year-old male transferred from [facility] after CT showing tonsillar abscess.  Received Unasyn at that time.  Evaluated by ENT with peritonsillar abscess drained.  ENT recommended admission for observation.  Discussed with internal medicine service who accepted the patient for further evaluation and management.    Risk  Prescription drug management.  Decision regarding hospitalization.             Medications   clindamycin in dextrose 5% (Cleocin) IVPB (premix) 600 mg 50 mL (0 mg Intravenous Stopped 12/3/24 1330)   acetaminophen (TYLENOL) tablet 650 mg (has no administration in time range)   enoxaparin (LOVENOX) subcutaneous injection 40 mg (40 mg Subcutaneous Not Given 12/3/24 1130)   ketorolac (TORADOL) injection 15 mg (has no administration in time range)   HYDROmorphone HCl (DILAUDID) injection 0.2 mg (0.2 mg Intravenous Given 12/3/24 0817)   lidocaine-epinephrine (XYLOCAINE/EPINEPHRINE) 2 %-1:100,000 injection 1 mL (1 mL Infiltration Given by Other 12/3/24 0836)   benzocaine (HURRICAINE) 20 % mucosal spray ( Mucosal Given by Other 12/3/24 0836)       ED Risk Strat Scores                           SBIRT 22yo+      Flowsheet Row Most Recent Value   Initial Alcohol Screen: US AUDIT-C     1. How often do you have a drink containing alcohol? 0 Filed at: 12/03/2024 0805   2. How many drinks containing alcohol do you have on a typical day you are  drinking?  0 Filed at: 12/03/2024 0805   3a. Male UNDER 65: How often do you have five or more drinks on one occasion? 0 Filed at: 12/03/2024 0805   Audit-C Score 0 Filed at: 12/03/2024 0805   MARLYN: How many times in the past year have you...    Used an illegal drug or used a prescription medication for non-medical reasons? Never Filed at: 12/03/2024 0805                            History of Present Illness       Chief Complaint   Patient presents with    Abscess     Pt transferred via EMS from [facility] with peritonsillar abscess. Pt c/o sore throat x12 days. Started on Augmentin Sunday but c/o worsening pain.        Past Medical History:   Diagnosis Date    Allergic       History reviewed. No pertinent surgical history.   Family History   Problem Relation Age of Onset    Hypertension Mother     Hyperlipidemia Mother     Hypertension Father     Heart disease Father     Hyperlipidemia Father       Social History     Tobacco Use    Smoking status: Never    Smokeless tobacco: Never   Vaping Use    Vaping status: Never Used   Substance Use Topics    Alcohol use: Never    Drug use: Never      E-Cigarette/Vaping    E-Cigarette Use Never User       E-Cigarette/Vaping Substances    Nicotine No     THC No     CBD No     Flavoring No     Other No     Unknown No       I have reviewed and agree with the history as documented.     (Rossy sammy Declanangelito) Rossy Driver is a 39 y.o. male     They presented to the emergency department on December 3, 2024. Patient presents with:  Abscess: Pt transferred via EMS from [facility] with peritonsillar abscess. Pt c/o sore throat x12 days. Started on Augmentin Sunday but c/o worsening pain.       The patient states that approximately 12 days ago he began experiencing sore throat at that time was evaluated and diagnosed with strep throat and placed on amoxicillin however patient then began experiencing discomfort in his throat as well as swelling was evaluated noted  to have peritonsillar abscess placed on Unasyn as well as evaluated by ENT, had improvement and was discharged with Augmentin.  Patient has been taking Augmentin however has noted that he has had worsening swelling as well as difficulty speaking and sore throat and presented again to [facility] ED where he was evaluated and had CT scan showing peritonsillar abscess. Patient denies current shortness of breath, chest tightness, difficulty breathing, drooling, inability to open his mouth, current fever, abdominal pain, nausea, vomiting, change in bowel habits, change in urination, or any other complaint at this time.    Per review of chart patient was given Unasyn as well as Toradol in the emergency department prior to being transferred.              Review of Systems   Constitutional:  Negative for chills and fever.   HENT:  Positive for sore throat, trouble swallowing and voice change. Negative for drooling and ear pain.    Eyes:  Negative for pain and visual disturbance.   Respiratory:  Negative for cough and shortness of breath.    Cardiovascular:  Negative for chest pain and palpitations.   Gastrointestinal:  Negative for abdominal pain and vomiting.   Genitourinary:  Negative for dysuria and hematuria.   Musculoskeletal:  Negative for arthralgias and back pain.   Skin:  Negative for color change and rash.   Neurological:  Negative for seizures and syncope.   All other systems reviewed and are negative.          Objective       ED Triage Vitals [12/03/24 0803]   Temperature Pulse Blood Pressure Respirations SpO2 Patient Position - Orthostatic VS   98 °F (36.7 °C) 76 126/81 18 96 % Sitting      Temp Source Heart Rate Source BP Location FiO2 (%) Pain Score    Oral Monitor Left arm -- 8      Vitals      Date and Time Temp Pulse SpO2 Resp BP Pain Score FACES Pain Rating User   12/03/24 1131 -- 98 96 % 18 118/75 2 -- KL   12/03/24 0817 -- -- -- -- -- 8 -- LS   12/03/24 0803 98 °F (36.7 °C) 76 96 % 18 126/81 8 --              Physical Exam  Vitals and nursing note reviewed.   Constitutional:       General: He is in acute distress (Moderate).      Appearance: Normal appearance.   HENT:      Head: Normocephalic and atraumatic.      Right Ear: External ear normal.      Left Ear: External ear normal.      Nose: Nose normal.      Mouth/Throat:      Pharynx: Posterior oropharyngeal erythema present.      Comments: Significant swelling overlying the right tonsillar area in addition to superior aspect posterior to the soft palate with uvular deviation, muffled voice, no drooling, no trismus  Eyes:      Conjunctiva/sclera: Conjunctivae normal.   Cardiovascular:      Rate and Rhythm: Normal rate and regular rhythm.   Pulmonary:      Effort: Pulmonary effort is normal. No respiratory distress.      Breath sounds: Normal breath sounds.   Abdominal:      General: Abdomen is flat. Bowel sounds are normal.      Tenderness: There is no abdominal tenderness. There is no guarding or rebound.   Musculoskeletal:         General: Normal range of motion.      Cervical back: Normal range of motion.   Skin:     General: Skin is warm and dry.      Capillary Refill: Capillary refill takes less than 2 seconds.   Neurological:      Mental Status: He is alert. Mental status is at baseline.   Psychiatric:         Mood and Affect: Mood normal.         Results Reviewed       None            No orders to display       Procedures    ED Medication and Procedure Management   None     Patient's Medications    No medications on file     No discharge procedures on file.  ED SEPSIS DOCUMENTATION   Time reflects when diagnosis was documented in both MDM as applicable and the Disposition within this note       Time User Action Codes Description Comment    12/3/2024  9:25 AM Pilo Fam Add [J36] Peritonsillar abscess                  Romain Anderson MD  12/03/24 3446     2018/not applicable

## 2025-03-10 NOTE — ASU PATIENT PROFILE, ADULT - NS TRANSFER DENTURES
Problem: At Risk for Falls  Goal: # Patient does not fall  Outcome: Outcome Met, Continue evaluating goal progress toward completion  Goal: # Takes action to control fall-related risks  Outcome: Outcome Met, Continue evaluating goal progress toward completion  Goal: # Verbalizes understanding of fall risk/precautions  Description: Document education using the patient education activity  Outcome: Outcome Met, Continue evaluating goal progress toward completion     Problem: At Risk for Injury Due to Fall  Goal: # Patient does not fall  Outcome: Outcome Met, Continue evaluating goal progress toward completion  Goal: # Takes action to control condition specific risks  Outcome: Outcome Met, Continue evaluating goal progress toward completion  Goal: # Verbalizes understanding of fall-related injury personal risks  Description: Document education using the patient education activity  Outcome: Outcome Met, Continue evaluating goal progress toward completion     Problem: VTE, Risk for  Goal: # No s/s of VTE  Outcome: Outcome Met, Continue evaluating goal progress toward completion  Goal: # Verbalizes understanding of VTE risk factors and prevention  Description: Document education using the patient education activity. Outcome: Outcome Met, Continue evaluating goal progress toward completion  Goal: Demonstrates ability to administer injectable anticoagulants if ordered for d/c  Description: Document education using the patient education activity.   Outcome: Outcome Met, Continue evaluating goal progress toward completion none

## 2025-03-11 ENCOUNTER — APPOINTMENT (OUTPATIENT)
Dept: SURGICAL ONCOLOGY | Facility: HOSPITAL | Age: 55
End: 2025-03-11

## 2025-03-11 ENCOUNTER — APPOINTMENT (OUTPATIENT)
Dept: NUCLEAR MEDICINE | Facility: HOSPITAL | Age: 55
End: 2025-03-11

## 2025-03-11 ENCOUNTER — TRANSCRIPTION ENCOUNTER (OUTPATIENT)
Age: 55
End: 2025-03-11

## 2025-03-11 ENCOUNTER — INPATIENT (INPATIENT)
Facility: HOSPITAL | Age: 55
LOS: 4 days | Discharge: HOME | End: 2025-03-16
Attending: PLASTIC SURGERY | Admitting: PLASTIC SURGERY
Payer: COMMERCIAL

## 2025-03-11 ENCOUNTER — APPOINTMENT (OUTPATIENT)
Dept: PLASTIC SURGERY | Facility: HOSPITAL | Age: 55
End: 2025-03-11

## 2025-03-11 ENCOUNTER — RESULT REVIEW (OUTPATIENT)
Age: 55
End: 2025-03-11

## 2025-03-11 VITALS
WEIGHT: 139.99 LBS | RESPIRATION RATE: 16 BRPM | SYSTOLIC BLOOD PRESSURE: 135 MMHG | HEART RATE: 82 BPM | TEMPERATURE: 98 F | DIASTOLIC BLOOD PRESSURE: 90 MMHG | HEIGHT: 63 IN | OXYGEN SATURATION: 100 %

## 2025-03-11 DIAGNOSIS — C50.211 MALIGNANT NEOPLASM OF UPPER-INNER QUADRANT OF RIGHT FEMALE BREAST: ICD-10-CM

## 2025-03-11 DIAGNOSIS — Z96.0 PRESENCE OF UROGENITAL IMPLANTS: Chronic | ICD-10-CM

## 2025-03-11 DIAGNOSIS — Z98.890 OTHER SPECIFIED POSTPROCEDURAL STATES: Chronic | ICD-10-CM

## 2025-03-11 LAB
ANION GAP SERPL CALC-SCNC: 13 MMOL/L — SIGNIFICANT CHANGE UP (ref 7–14)
BLOOD GAS ARTERIAL - LYTES,HGB,ICA,LACT RESULT: SIGNIFICANT CHANGE UP
BUN SERPL-MCNC: 18 MG/DL — SIGNIFICANT CHANGE UP (ref 7–23)
CHLORIDE SERPL-SCNC: 106 MMOL/L — SIGNIFICANT CHANGE UP (ref 98–107)
CO2 SERPL-SCNC: 19 MMOL/L — LOW (ref 22–31)
CREAT SERPL-MCNC: 0.84 MG/DL — SIGNIFICANT CHANGE UP (ref 0.5–1.3)
EGFR: 82 ML/MIN/1.73M2 — SIGNIFICANT CHANGE UP
GAS PNL BLDA: SIGNIFICANT CHANGE UP
GLUCOSE BLDC GLUCOMTR-MCNC: 172 MG/DL — HIGH (ref 70–99)
GLUCOSE BLDC GLUCOMTR-MCNC: 195 MG/DL — HIGH (ref 70–99)
GLUCOSE SERPL-MCNC: 203 MG/DL — HIGH (ref 70–99)
HCT VFR BLD CALC: 31.2 % — LOW (ref 34.5–45)
HGB BLD-MCNC: 10.2 G/DL — LOW (ref 11.5–15.5)
MCHC RBC-ENTMCNC: 27.6 PG — SIGNIFICANT CHANGE UP (ref 27–34)
MCHC RBC-ENTMCNC: 32.7 G/DL — SIGNIFICANT CHANGE UP (ref 32–36)
MCV RBC AUTO: 84.3 FL — SIGNIFICANT CHANGE UP (ref 80–100)
NRBC # BLD AUTO: 0 K/UL — SIGNIFICANT CHANGE UP (ref 0–0)
NRBC # FLD: 0 K/UL — SIGNIFICANT CHANGE UP (ref 0–0)
NRBC BLD AUTO-RTO: 0 /100 WBCS — SIGNIFICANT CHANGE UP (ref 0–0)
PLATELET # BLD AUTO: 270 K/UL — SIGNIFICANT CHANGE UP (ref 150–400)
POTASSIUM SERPL-MCNC: 4.6 MMOL/L — SIGNIFICANT CHANGE UP (ref 3.5–5.3)
POTASSIUM SERPL-SCNC: 4.6 MMOL/L — SIGNIFICANT CHANGE UP (ref 3.5–5.3)
RBC # FLD: 12.6 % — SIGNIFICANT CHANGE UP (ref 10.3–14.5)
SODIUM SERPL-SCNC: 138 MMOL/L — SIGNIFICANT CHANGE UP (ref 135–145)
WBC # BLD: 11.43 K/UL — HIGH (ref 3.8–10.5)
WBC # FLD AUTO: 11.43 K/UL — HIGH (ref 3.8–10.5)

## 2025-03-11 PROCEDURE — 64912 NRV RPR W/NRV ALGRFT 1ST: CPT | Mod: LT

## 2025-03-11 PROCEDURE — 38792 RA TRACER ID OF SENTINL NODE: CPT | Mod: 50

## 2025-03-11 PROCEDURE — 88305 TISSUE EXAM BY PATHOLOGIST: CPT | Mod: 26

## 2025-03-11 PROCEDURE — S2068: CPT | Mod: RT

## 2025-03-11 PROCEDURE — 64912 NRV RPR W/NRV ALGRFT 1ST: CPT | Mod: RT

## 2025-03-11 PROCEDURE — 19303 MAST SIMPLE COMPLETE: CPT | Mod: 50

## 2025-03-11 PROCEDURE — 38530 BIOPSY/REMOVAL LYMPH NODES: CPT | Mod: LT

## 2025-03-11 PROCEDURE — 38525 BIOPSY/REMOVAL LYMPH NODES: CPT | Mod: 50

## 2025-03-11 PROCEDURE — 88377 M/PHMTRC ALYS ISHQUANT/SEMIQ: CPT | Mod: 26

## 2025-03-11 PROCEDURE — 38530 BIOPSY/REMOVAL LYMPH NODES: CPT | Mod: RT

## 2025-03-11 PROCEDURE — 21600 PARTIAL REMOVAL OF RIB: CPT | Mod: RT,59

## 2025-03-11 PROCEDURE — 88309 TISSUE EXAM BY PATHOLOGIST: CPT | Mod: 26

## 2025-03-11 PROCEDURE — 21600 PARTIAL REMOVAL OF RIB: CPT | Mod: LT,59

## 2025-03-11 PROCEDURE — 15777 ACELLULAR DERM MATRIX IMPLT: CPT | Mod: 50

## 2025-03-11 PROCEDURE — 88307 TISSUE EXAM BY PATHOLOGIST: CPT | Mod: 26

## 2025-03-11 PROCEDURE — 88360 TUMOR IMMUNOHISTOCHEM/MANUAL: CPT | Mod: 26

## 2025-03-11 PROCEDURE — S2068: CPT | Mod: LT

## 2025-03-11 DEVICE — MESH PHASIX 10X20CM RECTANGLE: Type: IMPLANTABLE DEVICE | Site: BILATERAL | Status: FUNCTIONAL

## 2025-03-11 DEVICE — CLIP APPLIER COVIDIEN SURGICLIP III 9" SM: Type: IMPLANTABLE DEVICE | Site: BILATERAL | Status: FUNCTIONAL

## 2025-03-11 DEVICE — DOPPLER PROBE DISPOSABLE: Type: IMPLANTABLE DEVICE | Site: BILATERAL | Status: FUNCTIONAL

## 2025-03-11 DEVICE — CARTRIDGE MICROCLIP 30: Type: IMPLANTABLE DEVICE | Site: BILATERAL | Status: FUNCTIONAL

## 2025-03-11 DEVICE — COUPLER VESSEL ANASTOMOTIC 2.5MM: Type: IMPLANTABLE DEVICE | Site: BILATERAL | Status: FUNCTIONAL

## 2025-03-11 DEVICE — LIGATING CLIPS WECK HORIZON SMALL-WIDE (RED) 24: Type: IMPLANTABLE DEVICE | Site: BILATERAL | Status: FUNCTIONAL

## 2025-03-11 DEVICE — COUPLER VESSEL ANASTOMOTIC 3MM: Type: IMPLANTABLE DEVICE | Site: BILATERAL | Status: FUNCTIONAL

## 2025-03-11 DEVICE — COUPLER VESSEL MICROVASC ANAST 2MM GRN: Type: IMPLANTABLE DEVICE | Site: BILATERAL | Status: FUNCTIONAL

## 2025-03-11 DEVICE — LIGATING CLIPS WECK HORIZON MEDIUM (BLUE) 24: Type: IMPLANTABLE DEVICE | Site: BILATERAL | Status: FUNCTIONAL

## 2025-03-11 DEVICE — CLIP APPLIER COVIDIEN SURGICLIP 11.5" MEDIUM: Type: IMPLANTABLE DEVICE | Site: BILATERAL | Status: FUNCTIONAL

## 2025-03-11 RX ORDER — OXYCODONE HYDROCHLORIDE 30 MG/1
10 TABLET ORAL EVERY 4 HOURS
Refills: 0 | Status: DISCONTINUED | OUTPATIENT
Start: 2025-03-11 | End: 2025-03-16

## 2025-03-11 RX ORDER — SODIUM CHLORIDE 9 G/1000ML
1000 INJECTION, SOLUTION INTRAVENOUS
Refills: 0 | Status: DISCONTINUED | OUTPATIENT
Start: 2025-03-11 | End: 2025-03-11

## 2025-03-11 RX ORDER — RAMIPRIL 2.5 MG/1
1 CAPSULE ORAL
Refills: 0 | DISCHARGE

## 2025-03-11 RX ORDER — ACETAMINOPHEN 500 MG/5ML
1000 LIQUID (ML) ORAL EVERY 6 HOURS
Refills: 0 | Status: COMPLETED | OUTPATIENT
Start: 2025-03-11 | End: 2025-03-12

## 2025-03-11 RX ORDER — HYDROMORPHONE/SOD CHLOR,ISO/PF 2 MG/10 ML
0.5 SYRINGE (ML) INJECTION EVERY 4 HOURS
Refills: 0 | Status: DISCONTINUED | OUTPATIENT
Start: 2025-03-11 | End: 2025-03-16

## 2025-03-11 RX ORDER — BUPIVACAINE 13.3 MG/ML
20 INJECTION, SUSPENSION, LIPOSOMAL INFILTRATION ONCE
Refills: 0 | Status: DISCONTINUED | OUTPATIENT
Start: 2025-03-11 | End: 2025-03-16

## 2025-03-11 RX ORDER — ERGOCALCIFEROL 1.25 MG/1
1 CAPSULE ORAL
Refills: 0 | DISCHARGE

## 2025-03-11 RX ORDER — ENOXAPARIN SODIUM 100 MG/ML
40 INJECTION SUBCUTANEOUS EVERY 24 HOURS
Refills: 0 | Status: DISCONTINUED | OUTPATIENT
Start: 2025-03-12 | End: 2025-03-16

## 2025-03-11 RX ORDER — ONDANSETRON HCL/PF 4 MG/2 ML
4 VIAL (ML) INJECTION EVERY 6 HOURS
Refills: 0 | Status: DISCONTINUED | OUTPATIENT
Start: 2025-03-11 | End: 2025-03-16

## 2025-03-11 RX ORDER — SODIUM CHLORIDE 9 G/1000ML
1000 INJECTION, SOLUTION INTRAVENOUS
Refills: 0 | Status: DISCONTINUED | OUTPATIENT
Start: 2025-03-11 | End: 2025-03-13

## 2025-03-11 RX ORDER — SENNA 187 MG
2 TABLET ORAL AT BEDTIME
Refills: 0 | Status: DISCONTINUED | OUTPATIENT
Start: 2025-03-11 | End: 2025-03-16

## 2025-03-11 RX ORDER — MELATONIN 5 MG
3 TABLET ORAL AT BEDTIME
Refills: 0 | Status: DISCONTINUED | OUTPATIENT
Start: 2025-03-11 | End: 2025-03-16

## 2025-03-11 RX ORDER — KETOROLAC TROMETHAMINE 30 MG/ML
15 INJECTION, SOLUTION INTRAMUSCULAR; INTRAVENOUS EVERY 6 HOURS
Refills: 0 | Status: DISCONTINUED | OUTPATIENT
Start: 2025-03-11 | End: 2025-03-13

## 2025-03-11 RX ORDER — CALCIUM CARBONATE 750 MG/1
1 TABLET ORAL EVERY 4 HOURS
Refills: 0 | Status: DISCONTINUED | OUTPATIENT
Start: 2025-03-11 | End: 2025-03-16

## 2025-03-11 RX ORDER — CEFAZOLIN SODIUM IN 0.9 % NACL 3 G/100 ML
2000 INTRAVENOUS SOLUTION, PIGGYBACK (ML) INTRAVENOUS EVERY 8 HOURS
Refills: 0 | Status: COMPLETED | OUTPATIENT
Start: 2025-03-11 | End: 2025-03-12

## 2025-03-11 RX ORDER — ACETAMINOPHEN 500 MG/5ML
975 LIQUID (ML) ORAL EVERY 6 HOURS
Refills: 0 | Status: COMPLETED | OUTPATIENT
Start: 2025-03-11 | End: 2026-02-07

## 2025-03-11 RX ORDER — DIAZEPAM 2 MG/1
5 TABLET ORAL EVERY 6 HOURS
Refills: 0 | Status: DISCONTINUED | OUTPATIENT
Start: 2025-03-11 | End: 2025-03-16

## 2025-03-11 RX ORDER — LINAGLIPTIN 5 MG/1
1 TABLET, FILM COATED ORAL
Refills: 0 | DISCHARGE

## 2025-03-11 RX ORDER — HYDROMORPHONE/SOD CHLOR,ISO/PF 2 MG/10 ML
0.25 SYRINGE (ML) INJECTION
Refills: 0 | Status: DISCONTINUED | OUTPATIENT
Start: 2025-03-11 | End: 2025-03-16

## 2025-03-11 RX ORDER — OXYCODONE HYDROCHLORIDE 30 MG/1
5 TABLET ORAL EVERY 4 HOURS
Refills: 0 | Status: DISCONTINUED | OUTPATIENT
Start: 2025-03-11 | End: 2025-03-16

## 2025-03-11 RX ORDER — IBUPROFEN 200 MG
400 TABLET ORAL EVERY 6 HOURS
Refills: 0 | Status: COMPLETED | OUTPATIENT
Start: 2025-03-11 | End: 2026-02-07

## 2025-03-11 RX ORDER — DIPHENHYDRAMINE HCL 12.5MG/5ML
25 ELIXIR ORAL EVERY 4 HOURS
Refills: 0 | Status: DISCONTINUED | OUTPATIENT
Start: 2025-03-11 | End: 2025-03-16

## 2025-03-11 RX ORDER — HYDROMORPHONE/SOD CHLOR,ISO/PF 2 MG/10 ML
0.5 SYRINGE (ML) INJECTION
Refills: 0 | Status: DISCONTINUED | OUTPATIENT
Start: 2025-03-11 | End: 2025-03-16

## 2025-03-11 RX ORDER — SIMETHICONE 80 MG
80 TABLET,CHEWABLE ORAL EVERY 6 HOURS
Refills: 0 | Status: DISCONTINUED | OUTPATIENT
Start: 2025-03-11 | End: 2025-03-16

## 2025-03-11 RX ORDER — DIAZEPAM 2 MG/1
5 TABLET ORAL ONCE
Refills: 0 | Status: DISCONTINUED | OUTPATIENT
Start: 2025-03-11 | End: 2025-03-12

## 2025-03-11 RX ORDER — METOCLOPRAMIDE HCL 10 MG
10 TABLET ORAL EVERY 6 HOURS
Refills: 0 | Status: DISCONTINUED | OUTPATIENT
Start: 2025-03-11 | End: 2025-03-16

## 2025-03-11 RX ADMIN — DIAZEPAM 5 MILLIGRAM(S): 2 TABLET ORAL at 21:05

## 2025-03-11 RX ADMIN — SODIUM CHLORIDE 125 MILLILITER(S): 9 INJECTION, SOLUTION INTRAVENOUS at 20:06

## 2025-03-11 RX ADMIN — KETOROLAC TROMETHAMINE 15 MILLIGRAM(S): 30 INJECTION, SOLUTION INTRAMUSCULAR; INTRAVENOUS at 21:30

## 2025-03-11 RX ADMIN — KETOROLAC TROMETHAMINE 15 MILLIGRAM(S): 30 INJECTION, SOLUTION INTRAMUSCULAR; INTRAVENOUS at 21:00

## 2025-03-11 NOTE — CHART NOTE - NSCHARTNOTEFT_GEN_A_CORE
POST-OP NOTE    KAT BEASLEY | [id number] | [facility]    Procedure: s/p b/l mastectomy and NARCISA flap closure with PRS    Subjective:  Patient seen and examined at bedside.  Pain along the incision areas, hurts with breathing and coughing.  Audible doppler signals present at bedside.  Incisions appear cdi without signs of hematoma or erythema.      Vital Signs Last 24 Hrs  T(C): 36.7 (12 Mar 2025 00:00), Max: 36.8 (11 Mar 2025 07:10)  T(F): 98.1 (12 Mar 2025 00:00), Max: 98.2 (11 Mar 2025 07:10)  HR: 85 (12 Mar 2025 00:00) (82 - 99)  BP: 94/56 (12 Mar 2025 00:00) (89/56 - 159/79)  BP(mean): 68 (12 Mar 2025 00:00) (66 - 101)  RR: 15 (12 Mar 2025 00:00) (12 - 20)  SpO2: 100% (12 Mar 2025 00:00) (96% - 100%)    Parameters below as of 11 Mar 2025 20:00  Patient On (Oxygen Delivery Method): nasal cannula  O2 Flow (L/min): 4    I&O's Summary    11 Mar 2025 07:01  -  12 Mar 2025 00:20  --------------------------------------------------------  IN: 425 mL / OUT: 151 mL / NET: 274 mL                            10.2   11.43 )-----------( 270      ( 11 Mar 2025 20:45 )             31.2     03-11    138  |  106  |  18  ----------------------------<  203[H]  4.6   |  19[L]  |  0.84    Ca    8.0[L]      11 Mar 2025 20:45         PHYSICAL EXAM:  Gen: NAD  Chest: b/l NARCISA incision sites cdi without signs of oozing, underlying hematoma, or erythema, audible doppler signals  Resp: breathing easily, no stridor  CV: RRR  Abdomen: soft, nontender, nondistended  Skin: Incision c/d/i. Normal color, no rashes or lesions      A:  54year old female with pmhx of hyperlipidemia, calculus of kidney, T2DM , Gout and newly diagnosed right breast cancer. She is now s/p bilateral mastectomies bilateral axillary sentinel lymph node biopsy and NARCISA flap closure with PRS on 3/11/25.      P:  - Pain control  - NPO with sips  - Monitor vitals  - Monitor I/Os  - Remainder of care per PRS primary    E team  [id number]

## 2025-03-12 LAB
ANION GAP SERPL CALC-SCNC: 12 MMOL/L — SIGNIFICANT CHANGE UP (ref 7–14)
BUN SERPL-MCNC: 20 MG/DL — SIGNIFICANT CHANGE UP (ref 7–23)
CALCIUM SERPL-MCNC: 7.8 MG/DL — LOW (ref 8.4–10.5)
CHLORIDE SERPL-SCNC: 107 MMOL/L — SIGNIFICANT CHANGE UP (ref 98–107)
CO2 SERPL-SCNC: 21 MMOL/L — LOW (ref 22–31)
CREAT SERPL-MCNC: 1.11 MG/DL — SIGNIFICANT CHANGE UP (ref 0.5–1.3)
EGFR: 58 ML/MIN/1.73M2 — LOW
EGFR: 58 ML/MIN/1.73M2 — LOW
GLUCOSE BLDC GLUCOMTR-MCNC: 171 MG/DL — HIGH (ref 70–99)
GLUCOSE BLDC GLUCOMTR-MCNC: 181 MG/DL — HIGH (ref 70–99)
GLUCOSE BLDC GLUCOMTR-MCNC: 199 MG/DL — HIGH (ref 70–99)
GLUCOSE BLDC GLUCOMTR-MCNC: 209 MG/DL — HIGH (ref 70–99)
GLUCOSE SERPL-MCNC: 162 MG/DL — HIGH (ref 70–99)
HGB BLD-MCNC: 8.6 G/DL — LOW (ref 11.5–15.5)
MCHC RBC-ENTMCNC: 27.4 PG — SIGNIFICANT CHANGE UP (ref 27–34)
MCHC RBC-ENTMCNC: 31.9 G/DL — LOW (ref 32–36)
MCV RBC AUTO: 86 FL — SIGNIFICANT CHANGE UP (ref 80–100)
NRBC # BLD AUTO: 0 K/UL — SIGNIFICANT CHANGE UP (ref 0–0)
NRBC # FLD: 0 K/UL — SIGNIFICANT CHANGE UP (ref 0–0)
NRBC BLD AUTO-RTO: 0 /100 WBCS — SIGNIFICANT CHANGE UP (ref 0–0)
PLATELET # BLD AUTO: 221 K/UL — SIGNIFICANT CHANGE UP (ref 150–400)
POTASSIUM SERPL-SCNC: 4.4 MMOL/L — SIGNIFICANT CHANGE UP (ref 3.5–5.3)
RBC # BLD: 3.14 M/UL — LOW (ref 3.8–5.2)
RBC # FLD: 12.7 % — SIGNIFICANT CHANGE UP (ref 10.3–14.5)
SODIUM SERPL-SCNC: 140 MMOL/L — SIGNIFICANT CHANGE UP (ref 135–145)
WBC # BLD: 8.3 K/UL — SIGNIFICANT CHANGE UP (ref 3.8–10.5)
WBC # FLD AUTO: 8.3 K/UL — SIGNIFICANT CHANGE UP (ref 3.8–10.5)

## 2025-03-12 RX ORDER — SODIUM CHLORIDE 9 G/1000ML
1000 INJECTION, SOLUTION INTRAVENOUS
Refills: 0 | Status: DISCONTINUED | OUTPATIENT
Start: 2025-03-12 | End: 2025-03-16

## 2025-03-12 RX ORDER — DEXTROSE 50 % IN WATER 50 %
12.5 SYRINGE (ML) INTRAVENOUS ONCE
Refills: 0 | Status: DISCONTINUED | OUTPATIENT
Start: 2025-03-12 | End: 2025-03-16

## 2025-03-12 RX ORDER — DEXTROSE 50 % IN WATER 50 %
25 SYRINGE (ML) INTRAVENOUS ONCE
Refills: 0 | Status: DISCONTINUED | OUTPATIENT
Start: 2025-03-12 | End: 2025-03-16

## 2025-03-12 RX ORDER — INSULIN LISPRO 100 U/ML
INJECTION, SOLUTION INTRAVENOUS; SUBCUTANEOUS
Refills: 0 | Status: DISCONTINUED | OUTPATIENT
Start: 2025-03-12 | End: 2025-03-13

## 2025-03-12 RX ORDER — DEXTROSE 50 % IN WATER 50 %
15 SYRINGE (ML) INTRAVENOUS ONCE
Refills: 0 | Status: DISCONTINUED | OUTPATIENT
Start: 2025-03-12 | End: 2025-03-16

## 2025-03-12 RX ORDER — GLUCAGON 3 MG/1
1 POWDER NASAL ONCE
Refills: 0 | Status: DISCONTINUED | OUTPATIENT
Start: 2025-03-12 | End: 2025-03-16

## 2025-03-12 RX ORDER — CEFAZOLIN SODIUM IN 0.9 % NACL 3 G/100 ML
2000 INTRAVENOUS SOLUTION, PIGGYBACK (ML) INTRAVENOUS EVERY 8 HOURS
Refills: 0 | Status: COMPLETED | OUTPATIENT
Start: 2025-03-12 | End: 2025-03-15

## 2025-03-12 RX ADMIN — INSULIN LISPRO 1: 100 INJECTION, SOLUTION INTRAVENOUS; SUBCUTANEOUS at 17:32

## 2025-03-12 RX ADMIN — Medication 100 MILLIGRAM(S): at 23:17

## 2025-03-12 RX ADMIN — SODIUM CHLORIDE 75 MILLILITER(S): 9 INJECTION, SOLUTION INTRAVENOUS at 15:02

## 2025-03-12 RX ADMIN — KETOROLAC TROMETHAMINE 15 MILLIGRAM(S): 30 INJECTION, SOLUTION INTRAMUSCULAR; INTRAVENOUS at 21:13

## 2025-03-12 RX ADMIN — KETOROLAC TROMETHAMINE 15 MILLIGRAM(S): 30 INJECTION, SOLUTION INTRAMUSCULAR; INTRAVENOUS at 08:20

## 2025-03-12 RX ADMIN — DIAZEPAM 5 MILLIGRAM(S): 2 TABLET ORAL at 18:53

## 2025-03-12 RX ADMIN — Medication 400 MILLIGRAM(S): at 00:00

## 2025-03-12 RX ADMIN — ENOXAPARIN SODIUM 40 MILLIGRAM(S): 100 INJECTION SUBCUTANEOUS at 14:08

## 2025-03-12 RX ADMIN — Medication 400 MILLIGRAM(S): at 06:00

## 2025-03-12 RX ADMIN — KETOROLAC TROMETHAMINE 15 MILLIGRAM(S): 30 INJECTION, SOLUTION INTRAMUSCULAR; INTRAVENOUS at 03:30

## 2025-03-12 RX ADMIN — Medication 100 MILLIGRAM(S): at 08:17

## 2025-03-12 RX ADMIN — OXYCODONE HYDROCHLORIDE 10 MILLIGRAM(S): 30 TABLET ORAL at 14:08

## 2025-03-12 RX ADMIN — Medication 1000 MILLIGRAM(S): at 07:30

## 2025-03-12 RX ADMIN — KETOROLAC TROMETHAMINE 15 MILLIGRAM(S): 30 INJECTION, SOLUTION INTRAMUSCULAR; INTRAVENOUS at 03:00

## 2025-03-12 RX ADMIN — Medication 400 MILLIGRAM(S): at 17:43

## 2025-03-12 RX ADMIN — Medication 400 MILLIGRAM(S): at 12:55

## 2025-03-12 RX ADMIN — SODIUM CHLORIDE 75 MILLILITER(S): 9 INJECTION, SOLUTION INTRAVENOUS at 21:12

## 2025-03-12 RX ADMIN — Medication 100 MILLIGRAM(S): at 01:00

## 2025-03-12 RX ADMIN — DIAZEPAM 5 MILLIGRAM(S): 2 TABLET ORAL at 11:09

## 2025-03-12 RX ADMIN — Medication 1000 MILLIGRAM(S): at 13:45

## 2025-03-12 RX ADMIN — KETOROLAC TROMETHAMINE 15 MILLIGRAM(S): 30 INJECTION, SOLUTION INTRAMUSCULAR; INTRAVENOUS at 14:57

## 2025-03-12 RX ADMIN — KETOROLAC TROMETHAMINE 15 MILLIGRAM(S): 30 INJECTION, SOLUTION INTRAMUSCULAR; INTRAVENOUS at 08:35

## 2025-03-12 RX ADMIN — KETOROLAC TROMETHAMINE 15 MILLIGRAM(S): 30 INJECTION, SOLUTION INTRAMUSCULAR; INTRAVENOUS at 15:45

## 2025-03-12 RX ADMIN — Medication 100 MILLIGRAM(S): at 16:24

## 2025-03-12 RX ADMIN — OXYCODONE HYDROCHLORIDE 10 MILLIGRAM(S): 30 TABLET ORAL at 15:00

## 2025-03-12 RX ADMIN — Medication 1000 MILLIGRAM(S): at 00:15

## 2025-03-12 RX ADMIN — INSULIN LISPRO 1: 100 INJECTION, SOLUTION INTRAVENOUS; SUBCUTANEOUS at 12:52

## 2025-03-12 NOTE — PATIENT PROFILE ADULT - FALL HARM RISK - RISK INTERVENTIONS
Assistance OOB with selected safe patient handling equipment/Assistance with ambulation/Communicate Fall Risk and Risk Factors to all staff, patient, and family/Monitor gait and stability/Reinforce activity limits and safety measures with patient and family/Sit up slowly, dangle for a short time, stand at bedside before walking/Use of alarms - bed, chair and/or voice tab/Visual Cue: Yellow wristband/Bed in lowest position, wheels locked, appropriate side rails in place/Call bell, personal items and telephone in reach/Instruct patient to call for assistance before getting out of bed or chair/Non-slip footwear when patient is out of bed/[place] to call system/Physically safe environment - no spills, clutter or unnecessary equipment/Purposeful Proactive Rounding/Room/bathroom lighting operational, light cord in reach

## 2025-03-13 ENCOUNTER — TRANSCRIPTION ENCOUNTER (OUTPATIENT)
Age: 55
End: 2025-03-13

## 2025-03-13 LAB
GLUCOSE BLDC GLUCOMTR-MCNC: 170 MG/DL — HIGH (ref 70–99)
GLUCOSE BLDC GLUCOMTR-MCNC: 172 MG/DL — HIGH (ref 70–99)
GLUCOSE BLDC GLUCOMTR-MCNC: 177 MG/DL — HIGH (ref 70–99)
GLUCOSE BLDC GLUCOMTR-MCNC: 224 MG/DL — HIGH (ref 70–99)

## 2025-03-13 PROCEDURE — 99024 POSTOP FOLLOW-UP VISIT: CPT

## 2025-03-13 RX ORDER — ACETAMINOPHEN 500 MG/5ML
975 LIQUID (ML) ORAL EVERY 6 HOURS
Refills: 0 | Status: DISCONTINUED | OUTPATIENT
Start: 2025-03-13 | End: 2025-03-16

## 2025-03-13 RX ORDER — IBUPROFEN 200 MG
400 TABLET ORAL EVERY 6 HOURS
Refills: 0 | Status: DISCONTINUED | OUTPATIENT
Start: 2025-03-13 | End: 2025-03-16

## 2025-03-13 RX ORDER — INSULIN LISPRO 100 U/ML
INJECTION, SOLUTION INTRAVENOUS; SUBCUTANEOUS
Refills: 0 | Status: DISCONTINUED | OUTPATIENT
Start: 2025-03-13 | End: 2025-03-16

## 2025-03-13 RX ORDER — OXYCODONE HYDROCHLORIDE 30 MG/1
1 TABLET ORAL
Qty: 14 | Refills: 0
Start: 2025-03-13

## 2025-03-13 RX ADMIN — INSULIN LISPRO 1: 100 INJECTION, SOLUTION INTRAVENOUS; SUBCUTANEOUS at 21:59

## 2025-03-13 RX ADMIN — Medication 80 MILLIGRAM(S): at 14:53

## 2025-03-13 RX ADMIN — Medication 400 MILLIGRAM(S): at 11:32

## 2025-03-13 RX ADMIN — Medication 100 MILLIGRAM(S): at 17:46

## 2025-03-13 RX ADMIN — Medication 100 MILLIGRAM(S): at 08:21

## 2025-03-13 RX ADMIN — Medication 400 MILLIGRAM(S): at 17:52

## 2025-03-13 RX ADMIN — OXYCODONE HYDROCHLORIDE 5 MILLIGRAM(S): 30 TABLET ORAL at 01:35

## 2025-03-13 RX ADMIN — Medication 80 MILLIGRAM(S): at 01:35

## 2025-03-13 RX ADMIN — Medication 975 MILLIGRAM(S): at 17:52

## 2025-03-13 RX ADMIN — OXYCODONE HYDROCHLORIDE 5 MILLIGRAM(S): 30 TABLET ORAL at 02:35

## 2025-03-13 RX ADMIN — OXYCODONE HYDROCHLORIDE 5 MILLIGRAM(S): 30 TABLET ORAL at 18:50

## 2025-03-13 RX ADMIN — ENOXAPARIN SODIUM 40 MILLIGRAM(S): 100 INJECTION SUBCUTANEOUS at 14:26

## 2025-03-13 RX ADMIN — Medication 975 MILLIGRAM(S): at 13:31

## 2025-03-13 RX ADMIN — Medication 975 MILLIGRAM(S): at 11:31

## 2025-03-13 RX ADMIN — INSULIN LISPRO 1: 100 INJECTION, SOLUTION INTRAVENOUS; SUBCUTANEOUS at 17:45

## 2025-03-13 RX ADMIN — Medication 400 MILLIGRAM(S): at 13:31

## 2025-03-13 RX ADMIN — OXYCODONE HYDROCHLORIDE 5 MILLIGRAM(S): 30 TABLET ORAL at 19:50

## 2025-03-13 RX ADMIN — SODIUM CHLORIDE 75 MILLILITER(S): 9 INJECTION, SOLUTION INTRAVENOUS at 08:21

## 2025-03-13 RX ADMIN — DIAZEPAM 5 MILLIGRAM(S): 2 TABLET ORAL at 14:53

## 2025-03-13 RX ADMIN — KETOROLAC TROMETHAMINE 15 MILLIGRAM(S): 30 INJECTION, SOLUTION INTRAMUSCULAR; INTRAVENOUS at 05:10

## 2025-03-13 RX ADMIN — INSULIN LISPRO 2: 100 INJECTION, SOLUTION INTRAVENOUS; SUBCUTANEOUS at 11:56

## 2025-03-13 RX ADMIN — INSULIN LISPRO 1: 100 INJECTION, SOLUTION INTRAVENOUS; SUBCUTANEOUS at 08:22

## 2025-03-13 NOTE — DISCHARGE NOTE NURSING/CASE MANAGEMENT/SOCIAL WORK - NSDCPNINST_GEN_ALL_CORE
NOTIFY MD OF fever, chills, nausea, vomiting, pain unrelieved by pain meds, discharge from incision sites.  FABIÁN teaching provided, pt and family able to teach back.  no signs of acute distress.

## 2025-03-13 NOTE — DISCHARGE NOTE PROVIDER - CARE PROVIDER_API CALL
Roddy Riddle)  Plastic Surgery  [address] [place] [address], [address]  [place] [address]  Phone: [phone]  Fax: [phone]  Follow Up Time:    No

## 2025-03-13 NOTE — DISCHARGE NOTE PROVIDER - NSDCFUADDINST_GEN_ALL_CORE_FT
Activity:  - Rest at home during the first few days after surgery.  - Walking is encouraged, but strenuous exercise is not allowed until 6 weeks after surgery.   - Avoid strenuous activity. Do not lift your arms above your head. Do not lift more than 5-10  pounds.    Sleep:  Sleep on your back for the first two weeks after surgery.    Showering:  - You may take sponge baths following discharge. Pat dry. We will instruct you to shower once you  have drains removed from your breasts in the office.  - Do not take a bath or submerge yourself in water.  - You will have special adhesive glue or tape over the incisions. Do not take these off.  For the Breast:  You have just undergone a breast reconstruction with the NARCISA flap. Your breast will likely have bruising  and possibly some blistering on the skin, which is expected after a mastectomy. You have a small patch  of skin on your breasts, which is a different color than the surrounding breast skin. This paddle of skin  comes from the abdomen and is an indicator of how the flap is doing. It is important to check this skin  paddle daily. The skin should remain the same color. If the color of the small patch changes (i.e blue,  purple, pale), please call the office immediately ([phone]).    For the Abdomen:  Your incision and belly button are covered in a special medical grade sealant, which will come off in the  office, 2-3 weeks after surgery.    Drains:  Both the breast and abdomen will have drains. It is important to empty the drains twice daily and  record the outputs. Please bring this sheet to your appointment after surgery. Based on the output, the  drains will be removed 1 to 3 weeks after surgery. For the drains to be working appropriately, the bulbs  need to be collapsed to create a light suction. The nurse in the hospital will review the drain care with  you and your family prior to discharge home. It is best to safely secure the drains to your clothes with a  safety pin.  In an effort to make you more comfortable with discharge home and to answer any questions you may  have, you may call the office at [phone] at any time with additional questions or concerns.    Call the Office:  Do not hesitate to call the office with any concerns or questions. A doctor is available to answer your  questions 24 hours a day. Please notify us immediately at [phone] if:  1. You have increased swelling, pain, or color change in the breast.  2. One breast becomes suddenly significantly larger than the other breast.  3. You have a sudden increase in swelling of the abdomen.  4. You have redness develop around the incisions.  5. You have a fever greater than 101 F.  6. You develop sudden increase in pain.  7. You develop drainage, spreading redness or foul odor  8. You have any questions.

## 2025-03-13 NOTE — DISCHARGE NOTE PROVIDER - NSDCMRMEDTOKEN_GEN_ALL_CORE_FT
ergocalciferol 1.25 mg (50,000 intl units) oral tablet: 1 tab(s) orally once a week  glipiZIDE 10 mg oral tablet: 1 tab(s) orally once a day  oxyCODONE 5 mg oral tablet: 1 tab(s) orally every 6 hours as needed for  severe pain MDD: 4  ramipril 2.5 mg oral tablet: 1 tab(s) orally once a day (at bedtime)  rosuvastatin 10 mg oral capsule: 1 cap(s) orally once a day  Tradjenta 5 mg oral tablet: 1 tab(s) orally once a day after breakfast  Xigduo XR 10 mg-1000 mg oral tablet, extended release: 1 tab(s) orally once a day LD 3/7/25   aspirin 81 mg oral tablet, chewable: 1 tab(s) chewed MDD: 1  cefadroxil 500 mg oral capsule: 1 cap(s) orally 2 times a day  ergocalciferol 1.25 mg (50,000 intl units) oral tablet: 1 tab(s) orally once a week  glipiZIDE 10 mg oral tablet: 1 tab(s) orally once a day  oxyCODONE 5 mg oral tablet: 1 tab(s) orally every 6 hours as needed for  severe pain MDD: 4  ramipril 2.5 mg oral tablet: 1 tab(s) orally once a day (at bedtime)  rosuvastatin 10 mg oral capsule: 1 cap(s) orally once a day  Tradjenta 5 mg oral tablet: 1 tab(s) orally once a day after breakfast  Xigduo XR 10 mg-1000 mg oral tablet, extended release: 1 tab(s) orally once a day LD 3/7/25

## 2025-03-13 NOTE — DISCHARGE NOTE PROVIDER - REASON FOR ADMISSION
s/p therapeutic right and prophylactic left total mastectomies with bilateral axillary sentinel node biopsies, and otologist tissue reconstruction on 3/12/25

## 2025-03-13 NOTE — DISCHARGE NOTE NURSING/CASE MANAGEMENT/SOCIAL WORK - PATIENT PORTAL LINK FT
You can access the FollowMyHealth Patient Portal offered by [facility] by registering at the following website: [email or web address]. By joining [employer]’s FollowMyHealth portal, you will also be able to view your health information using other applications (apps) compatible with our system.

## 2025-03-13 NOTE — DISCHARGE NOTE NURSING/CASE MANAGEMENT/SOCIAL WORK - FINANCIAL ASSISTANCE
[facility] provides services at a reduced cost to those who are determined to be eligible through [facility]’s financial assistance program. Information regarding [facility]’s financial assistance program can be found by going to [email or web address] or by calling [phone].

## 2025-03-13 NOTE — DISCHARGE NOTE PROVIDER - HOSPITAL COURSE
POD 1 s/p therapeutic right and prophylactic left total mastectomies with bilateral axillary sentinel node biopsies, and otologist tissue reconstruction on 3/12/25. Patient is recovering well and hemodynamically stable to go home today.     Please take over the counter Tylenol for mild-moderate pain.   Oxycodone 5 mg for severe pain, rx sent to your pharmacy.   Please follow all instructions per Dr. Riddle regarding drain and wound care.   Wear pink bra and abdominal binder.   Follow up with Dr. Riddle in 1 week.    POD 1 s/p therapeutic right and prophylactic left total mastectomies with bilateral axillary sentinel node biopsies, and otologist tissue reconstruction on 3/12/25. Patient is recovering well and hemodynamically stable to go home today.     Please take over the counter Tylenol for mild-moderate pain.   Oxycodone 5 mg for severe pain, rx sent to your pharmacy.   Patient can shower with drains in place.   Please follow all instructions per Dr. Riddle regarding drain and wound care.   Wear pink bra and abdominal binder until follow-up.   Follow up with Dr. Riddle in 1 week.    POD 1 s/p therapeutic right and prophylactic left total mastectomies with bilateral axillary sentinel node biopsies, and otologist tissue reconstruction on 3/12/25. Patient is recovering well and hemodynamically stable to go home today.     Please take over the counter Tylenol for mild-moderate pain.   Oxycodone 5 mg for severe pain, rx sent to your pharmacy.   Take daily aspirin 81mg for 30 days.  Take antibiotics for 10 days.  Patient can shower with drains in place.   Please follow all instructions per Dr. Riddle regarding drain and wound care.   Wear pink bra and abdominal binder until follow-up.   Follow up with Dr. Riddle in 1 week.

## 2025-03-14 LAB
GLUCOSE BLDC GLUCOMTR-MCNC: 171 MG/DL — HIGH (ref 70–99)
GLUCOSE BLDC GLUCOMTR-MCNC: 187 MG/DL — HIGH (ref 70–99)
GLUCOSE BLDC GLUCOMTR-MCNC: 220 MG/DL — HIGH (ref 70–99)
GLUCOSE BLDC GLUCOMTR-MCNC: 256 MG/DL — HIGH (ref 70–99)

## 2025-03-14 RX ADMIN — OXYCODONE HYDROCHLORIDE 10 MILLIGRAM(S): 30 TABLET ORAL at 21:39

## 2025-03-14 RX ADMIN — Medication 80 MILLIGRAM(S): at 16:05

## 2025-03-14 RX ADMIN — Medication 975 MILLIGRAM(S): at 19:12

## 2025-03-14 RX ADMIN — OXYCODONE HYDROCHLORIDE 5 MILLIGRAM(S): 30 TABLET ORAL at 10:05

## 2025-03-14 RX ADMIN — Medication 975 MILLIGRAM(S): at 00:10

## 2025-03-14 RX ADMIN — INSULIN LISPRO 1: 100 INJECTION, SOLUTION INTRAVENOUS; SUBCUTANEOUS at 08:09

## 2025-03-14 RX ADMIN — Medication 400 MILLIGRAM(S): at 00:10

## 2025-03-14 RX ADMIN — Medication 400 MILLIGRAM(S): at 01:10

## 2025-03-14 RX ADMIN — Medication 975 MILLIGRAM(S): at 05:48

## 2025-03-14 RX ADMIN — ENOXAPARIN SODIUM 40 MILLIGRAM(S): 100 INJECTION SUBCUTANEOUS at 13:43

## 2025-03-14 RX ADMIN — Medication 100 MILLIGRAM(S): at 16:05

## 2025-03-14 RX ADMIN — Medication 400 MILLIGRAM(S): at 05:48

## 2025-03-14 RX ADMIN — Medication 400 MILLIGRAM(S): at 19:12

## 2025-03-14 RX ADMIN — OXYCODONE HYDROCHLORIDE 10 MILLIGRAM(S): 30 TABLET ORAL at 13:29

## 2025-03-14 RX ADMIN — OXYCODONE HYDROCHLORIDE 10 MILLIGRAM(S): 30 TABLET ORAL at 22:39

## 2025-03-14 RX ADMIN — Medication 975 MILLIGRAM(S): at 01:10

## 2025-03-14 RX ADMIN — OXYCODONE HYDROCHLORIDE 10 MILLIGRAM(S): 30 TABLET ORAL at 14:15

## 2025-03-14 RX ADMIN — Medication 100 MILLIGRAM(S): at 08:09

## 2025-03-14 RX ADMIN — INSULIN LISPRO 3: 100 INJECTION, SOLUTION INTRAVENOUS; SUBCUTANEOUS at 19:17

## 2025-03-14 RX ADMIN — INSULIN LISPRO 2: 100 INJECTION, SOLUTION INTRAVENOUS; SUBCUTANEOUS at 22:41

## 2025-03-14 RX ADMIN — Medication 975 MILLIGRAM(S): at 06:33

## 2025-03-14 RX ADMIN — Medication 400 MILLIGRAM(S): at 20:12

## 2025-03-14 RX ADMIN — OXYCODONE HYDROCHLORIDE 5 MILLIGRAM(S): 30 TABLET ORAL at 13:55

## 2025-03-14 RX ADMIN — INSULIN LISPRO 1: 100 INJECTION, SOLUTION INTRAVENOUS; SUBCUTANEOUS at 11:26

## 2025-03-14 RX ADMIN — Medication 975 MILLIGRAM(S): at 20:12

## 2025-03-14 RX ADMIN — Medication 100 MILLIGRAM(S): at 00:12

## 2025-03-14 NOTE — PROGRESS NOTE ADULT - ATTENDING COMMENTS
Pt seen and examined.   Agree with above    Flaps are warm, no congestion, soft. estefanía serosang, + doppler  Abd incision cdi, estefanía serosang    Plan - POD#3  -Cont flap check/cook  -Cont DVT prophyl  -Toradol IV  -OOB  -D/c planning likely tomorrow      -Surgical bra, binder      -Pain meds, abx, ASA daily      -Keep surgical site dry      -ESTEFANÍA teaching      -Flap monitoring teaching
Pt seen and examined.  Ambulated today  Pain under control    Flaps are warm, no congestion, soft, +doppler, estefanía serosang  Abd incision cdi, estefanía serosang    PLan - POD#1  -Cont flap check/Cook  -DVT prophyl  -Cont Toradol IV  -OOB  -Reg diet  -NO caffeine  - - VNS
Pt seen and examined. Agree with above  Ambulated some today.    Breast - flaps are warm, no congestion, soft, estefanía serosang, + doppler  Abd incision cdi, estefanía serosang    Plan - POD#2  -Cont flap check, cont Cook  -Cont Toradol IV  -Cont DVT prophyl  -OOB  - - VNS

## 2025-03-15 LAB
GLUCOSE BLDC GLUCOMTR-MCNC: 199 MG/DL — HIGH (ref 70–99)
GLUCOSE BLDC GLUCOMTR-MCNC: 224 MG/DL — HIGH (ref 70–99)
GLUCOSE BLDC GLUCOMTR-MCNC: 240 MG/DL — HIGH (ref 70–99)
GLUCOSE BLDC GLUCOMTR-MCNC: 258 MG/DL — HIGH (ref 70–99)

## 2025-03-15 RX ORDER — ASPIRIN 325 MG
1 TABLET ORAL
Qty: 30 | Refills: 0
Start: 2025-03-15 | End: 2025-04-13

## 2025-03-15 RX ORDER — CEFADROXIL 500 MG/1
1 CAPSULE ORAL
Qty: 20 | Refills: 0
Start: 2025-03-15 | End: 2025-03-24

## 2025-03-15 RX ADMIN — INSULIN LISPRO 1: 100 INJECTION, SOLUTION INTRAVENOUS; SUBCUTANEOUS at 08:14

## 2025-03-15 RX ADMIN — Medication 400 MILLIGRAM(S): at 11:59

## 2025-03-15 RX ADMIN — Medication 100 MILLIGRAM(S): at 00:51

## 2025-03-15 RX ADMIN — Medication 2 TABLET(S): at 21:23

## 2025-03-15 RX ADMIN — Medication 400 MILLIGRAM(S): at 00:51

## 2025-03-15 RX ADMIN — Medication 400 MILLIGRAM(S): at 19:05

## 2025-03-15 RX ADMIN — INSULIN LISPRO 3: 100 INJECTION, SOLUTION INTRAVENOUS; SUBCUTANEOUS at 21:22

## 2025-03-15 RX ADMIN — INSULIN LISPRO 2: 100 INJECTION, SOLUTION INTRAVENOUS; SUBCUTANEOUS at 11:55

## 2025-03-15 RX ADMIN — Medication 975 MILLIGRAM(S): at 01:51

## 2025-03-15 RX ADMIN — ENOXAPARIN SODIUM 40 MILLIGRAM(S): 100 INJECTION SUBCUTANEOUS at 14:40

## 2025-03-15 RX ADMIN — Medication 975 MILLIGRAM(S): at 12:30

## 2025-03-15 RX ADMIN — Medication 400 MILLIGRAM(S): at 07:18

## 2025-03-15 RX ADMIN — Medication 100 MILLIGRAM(S): at 08:20

## 2025-03-15 RX ADMIN — Medication 975 MILLIGRAM(S): at 18:36

## 2025-03-15 RX ADMIN — Medication 80 MILLIGRAM(S): at 21:23

## 2025-03-15 RX ADMIN — Medication 975 MILLIGRAM(S): at 07:18

## 2025-03-15 RX ADMIN — Medication 975 MILLIGRAM(S): at 19:05

## 2025-03-15 RX ADMIN — Medication 975 MILLIGRAM(S): at 06:18

## 2025-03-15 RX ADMIN — Medication 975 MILLIGRAM(S): at 11:59

## 2025-03-15 RX ADMIN — Medication 400 MILLIGRAM(S): at 18:36

## 2025-03-15 RX ADMIN — Medication 400 MILLIGRAM(S): at 06:18

## 2025-03-15 RX ADMIN — INSULIN LISPRO 2: 100 INJECTION, SOLUTION INTRAVENOUS; SUBCUTANEOUS at 17:04

## 2025-03-15 RX ADMIN — Medication 400 MILLIGRAM(S): at 01:51

## 2025-03-15 RX ADMIN — Medication 400 MILLIGRAM(S): at 12:30

## 2025-03-15 RX ADMIN — Medication 975 MILLIGRAM(S): at 00:51

## 2025-03-15 NOTE — PROGRESS NOTE ADULT - ASSESSMENT
A/P: 54y F s/p bilateral mastectomy with reconstruction with bilateral NARCISA based flaps.     - q2 flap checks   - continue cook doppler q2 checks  - d/c [place]   - PO Tylenol and Oxy for severe pain PRN  - continue toradol  - IVF to 75cc/hr  - continue Lovenox for DVT ChemoPPx, SCDs for DVT PPx  - regular diet   - OOB with assistance  - q4 vitals and I&Os  - O2 nasal cannula to 2L/min   - restart home medications as applicable  - transfer to surgical floor from PACU  -  drain care / teaching   - Set up VNS for anticipated D/c     Topher Flores MD, PhD  Plastic Surgery Chief Resident, PGY6  Available on [employer] Teams    PRS Team Pagers:  [facility]: [phone]  [facility]: [phone]  [facility]: [id number]  : [phone] 
54y F s/p bilateral mastectomy with reconstruction with bilateral NARCISA based flaps.     - q4 flap checks, assess for flap color, warmth, capillary refil, cook doppler  - PO Tylenol and Oxy for severe pain PRN  - continue Lovenox for DVT ChemoPPx, SCDs for DVT PPx  - regular diet   - OOB with assistance  - FABIÁN drain care / teaching   - Dispo: cleared for dc today with surgical bra, binder, antibiotics, ASA daily, and follow up with Dr. Riddle in 1 week  
A/P: 54y F s/p bilateral mastectomy with reconstruction with bilateral NARCISA based flaps.     - q2 flap checks   - continue cook doppler q2 checks  - PO Tylenol and Oxy for severe pain PRN  - continue toradol  - continue Lovenox for DVT ChemoPPx, SCDs for DVT PPx  - regular diet   - OOB with assistance  - q4 vitals and I&Os  - restart home medications as applicable  - FABIÁN drain care / teaching   - Set up VNS for anticipated D/c   
POD #1.  Therapeutic right and prophylactic left total mastectomies with bilateral axillary sentinel node biopsies, and otologist tissue reconstruction by Dr. Roddy Riddle.    Seen in the recovery room where she was observed overnight.    RN at bedside.    Patient is awake, alert, and comfortable.    Afebrile.  This morning her vital signs are normal and stable.  Overnight blood pressure was slightly low (not hypotensive), but at all times she was awake and alert, with adequate urine output.    Mastectomy skin flaps are healthy and viable, skin paddles of NARCISA flaps are well-perfused and not congested.    Output from drains is not voluminous.    Recuperating well.  Anticipate her being able to be transferred to a surgical floor this morning.  Advance diet and activity as tolerated.
POD #2.  This morning, ambulating in [place] with nursing assistant.    + Postoperative discomfort, in keeping with the scope of her operation.    Afebrile with stable vital signs.    Mastectomy skin flaps are healthy, skin paddle for free flaps is not ischemic or congested, no unusual postop swelling.    A/P:  Recuperating well.  Advance diet and activity as tolerated.  Instruct patient and family on drain management.  If eligible, should arrange for visiting nurse services to help with drain care at home.    If able to be discharged later today, her initial follow-up will be with plastic surgery next week.    I will call her with the surgical pathology report, when it is available.  That conversation will guide her follow-up with me.
POD #3.  Out of bed, eating breakfast.  Nursing assistant at bedside.    Continues to feel better.  Ambulating, tolerating regular diet, voiding.    Afebrile with stable vital signs.    Incisions are healing well.    Discharge planning in progress.
A/P: 54y F s/p bilateral mastectomy with reconstruction with bilateral NARCISA based flaps.     - q4 flap checks   - continue cook doppler q4 checks  - PO Tylenol and Oxy for severe pain PRN  - continue toradol  - continue Lovenox for DVT ChemoPPx, SCDs for DVT PPx  - regular diet   - OOB with assistance  - q4 vitals and I&Os  - restart home medications as applicable  - FABIÁN drain care / teaching   - Set up VNS for anticipated D/c 3/15    PRS

## 2025-03-15 NOTE — PROGRESS NOTE ADULT - SUBJECTIVE AND OBJECTIVE BOX
Plastic Surgery Progress Note (pg [facility]: [id number], [facility]: [phone])    SUBJECTIVE  NAEO. AVSS. Pt comfortable in bed. Pain well controlled, no complaints.    OBJECTIVE  ___________________________________________________  VITAL SIGNS / I&O's   Vital Signs Last 24 Hrs  T(C): 36.3 (15 Mar 2025 09:44), Max: 36.7 (14 Mar 2025 17:10)  T(F): 97.3 (15 Mar 2025 09:44), Max: 98 (14 Mar 2025 17:10)  HR: 79 (15 Mar 2025 09:44) (79 - 102)  BP: 119/62 (15 Mar 2025 09:44) (102/60 - 124/60)  BP(mean): --  RR: 18 (15 Mar 2025 09:44) (16 - 18)  SpO2: 98% (15 Mar 2025 09:44) (96% - 100%)    Parameters below as of 15 Mar 2025 09:44  Patient On (Oxygen Delivery Method): room air          14 Mar 2025 07:01  -  15 Mar 2025 07:00  --------------------------------------------------------  IN:    IV PiggyBack: 50 mL    Oral Fluid: 995 mL  Total IN: 1045 mL    OUT:    Bulb (mL): 8.5 mL    Bulb (mL): 12.5 mL    Bulb (mL): 72.5 mL    Bulb (mL): 49.5 mL    Bulb (mL): 62.5 mL    Bulb (mL): 39.5 mL    Voided (mL): 2225 mL  Total OUT: 2470 mL    Total NET: -1425 mL      15 Mar 2025 07:01  -  15 Mar 2025 09:49  --------------------------------------------------------  IN:    IV PiggyBack: 50 mL    Oral Fluid: 200 mL  Total IN: 250 mL    OUT:  Total OUT: 0 mL    Total NET: 250 mL        ___________________________________________________  PHYSICAL EXAM    -- CONSTITUTIONAL: AOx3. NAD.   -- RIGHT BREAST / FLAP: Mastectomy skin flap ecchymosis, stable. No collections. Flap soft and pink with 2-3 second capillary refill.  Drain(s) serosanguinous. Cook signal strong and clear  -- LEFT BREAST / FLAP: Mastectomy skin flap ecchymosis, stable. No collections. Flap soft and pink with 2-3 second capillary refill.  Drain(s) serosanguinous. Cook signal strong and clear  -- CARDIOVASCULAR: Regular rate  -- RESPIRATORY: unlabored respirations   -- ABDOMEN: Soft. No collections. Incision intact. Umbilicus viable. Drains serosanguinous.  ___________________________________________________  LABS            CAPILLARY BLOOD GLUCOSE      POCT Blood Glucose.: 199 mg/dL (15 Mar 2025 08:09)  POCT Blood Glucose.: 220 mg/dL (14 Mar 2025 22:40)  POCT Blood Glucose.: 256 mg/dL (14 Mar 2025 19:11)  POCT Blood Glucose.: 187 mg/dL (14 Mar 2025 11:25)          ___________________________________________________  MICRO  Recent Cultures:    ___________________________________________________  MEDICATIONS  (STANDING):  acetaminophen     Tablet .. 975 milliGRAM(s) Oral every 6 hours  BUpivacaine liposome 1.3% Injectable (no eMAR) 20 milliLiter(s) Local Injection once  dextrose 5%. 1000 milliLiter(s) (100 mL/Hr) IV Continuous <Continuous>  dextrose 5%. 1000 milliLiter(s) (50 mL/Hr) IV Continuous <Continuous>  dextrose 50% Injectable 25 Gram(s) IV Push once  dextrose 50% Injectable 12.5 Gram(s) IV Push once  dextrose 50% Injectable 25 Gram(s) IV Push once  enoxaparin Injectable 40 milliGRAM(s) SubCutaneous every 24 hours  glucagon  Injectable 1 milliGRAM(s) IntraMuscular once  ibuprofen  Tablet. 400 milliGRAM(s) Oral every 6 hours  insulin lispro (ADMELOG) corrective regimen sliding scale   SubCutaneous Before meals and at bedtime    MEDICATIONS  (PRN):  calcium carbonate    500 mG (Tums) Chewable 1 Tablet(s) Chew every 4 hours PRN Dyspepsia  dextrose Oral Gel 15 Gram(s) Oral once PRN Blood Glucose LESS THAN 70 milliGRAM(s)/deciliter  diazepam    Tablet 5 milliGRAM(s) Oral every 6 hours PRN Muscle spasm  diphenhydrAMINE Injectable 25 milliGRAM(s) IV Push every 4 hours PRN Rash and/or Itching  HYDROmorphone  Injectable 0.5 milliGRAM(s) IV Push every 15 minutes PRN Severe Pain (7 - 10)  HYDROmorphone  Injectable 0.25 milliGRAM(s) IV Push every 10 minutes PRN Moderate Pain (4 - 6)  HYDROmorphone  Injectable 0.5 milliGRAM(s) IV Push every 4 hours PRN Severe Pain (7 - 10)  melatonin 3 milliGRAM(s) Oral at bedtime PRN Sleep  metoclopramide 10 milliGRAM(s) Oral every 6 hours PRN breakthrough nausea and vomiting  ondansetron Injectable 4 milliGRAM(s) IV Push every 6 hours PRN Nausea and/or Vomiting  oxyCODONE    IR 5 milliGRAM(s) Oral every 4 hours PRN Moderate Pain (4 - 6)  oxyCODONE    IR 10 milliGRAM(s) Oral every 4 hours PRN Severe Pain (7 - 10)  senna 2 Tablet(s) Oral at bedtime PRN Constipation  simethicone 80 milliGRAM(s) Chew every 6 hours PRN Gas  
Plastic Surgery Progress Note (pg [facility]: [id number], [facility]: [phone])    SUBJECTIVE  The patient was seen and examined.     OBJECTIVE  ___________________________________________________  VITAL SIGNS / I&O's   Vital Signs Last 24 Hrs  T(C): 36.3 (14 Mar 2025 05:45), Max: 37 (13 Mar 2025 13:31)  T(F): 97.4 (14 Mar 2025 05:45), Max: 98.6 (13 Mar 2025 13:31)  HR: 83 (14 Mar 2025 05:45) (83 - 104)  BP: 118/62 (14 Mar 2025 05:45) (111/68 - 130/61)  BP(mean): --  RR: 18 (14 Mar 2025 05:45) (16 - 18)  SpO2: 100% (14 Mar 2025 05:45) (98% - 100%)    Parameters below as of 14 Mar 2025 05:45  Patient On (Oxygen Delivery Method): room air          13 Mar 2025 07:01  -  14 Mar 2025 07:00  --------------------------------------------------------  IN:    IV PiggyBack: 50 mL    Oral Fluid: 860 mL  Total IN: 910 mL    OUT:    Bulb (mL): 40.5 mL    Bulb (mL): 107 mL    Bulb (mL): 65 mL    Bulb (mL): 18 mL    Bulb (mL): 88 mL    Bulb (mL): 67 mL    Voided (mL): 2300 mL  Total OUT: 2685.5 mL    Total NET: -1775.5 mL        ___________________________________________________  PHYSICAL EXAM    -- CONSTITUTIONAL: AOx3. NAD.   -- RIGHT BREAST / FLAP: Mastectomy skin flap ecchymosis, stable. No collections. Flap soft and pink with 2-3 second capillary refill.  Drain(s) serosanguinous. Cook signal strong and clear  -- LEFT BREAST / FLAP: Mastectomy skin flap ecchymosis, stable. No collections. Flap soft and pink with 2-3 second capillary refill.  Drain(s) serosanguinous. Cook signal strong and clear  -- CARDIOVASCULAR: Regular rate  -- RESPIRATORY: unlabored respirations   -- ABDOMEN: Soft. No collections. Incision intact. Umbilicus viable. Drains serosanguinous.    ___________________________________________________  LABS            CAPILLARY BLOOD GLUCOSE      POCT Blood Glucose.: 171 mg/dL (14 Mar 2025 07:47)  POCT Blood Glucose.: 170 mg/dL (13 Mar 2025 21:56)  POCT Blood Glucose.: 172 mg/dL (13 Mar 2025 16:55)  POCT Blood Glucose.: 224 mg/dL (13 Mar 2025 11:36)          ___________________________________________________  MICRO  Recent Cultures:    ___________________________________________________  MEDICATIONS  (STANDING):  acetaminophen     Tablet .. 975 milliGRAM(s) Oral every 6 hours  BUpivacaine liposome 1.3% Injectable (no eMAR) 20 milliLiter(s) Local Injection once  ceFAZolin   IVPB 2000 milliGRAM(s) IV Intermittent every 8 hours  dextrose 5%. 1000 milliLiter(s) (100 mL/Hr) IV Continuous <Continuous>  dextrose 5%. 1000 milliLiter(s) (50 mL/Hr) IV Continuous <Continuous>  dextrose 50% Injectable 25 Gram(s) IV Push once  dextrose 50% Injectable 12.5 Gram(s) IV Push once  dextrose 50% Injectable 25 Gram(s) IV Push once  enoxaparin Injectable 40 milliGRAM(s) SubCutaneous every 24 hours  glucagon  Injectable 1 milliGRAM(s) IntraMuscular once  ibuprofen  Tablet. 400 milliGRAM(s) Oral every 6 hours  insulin lispro (ADMELOG) corrective regimen sliding scale   SubCutaneous Before meals and at bedtime    MEDICATIONS  (PRN):  calcium carbonate    500 mG (Tums) Chewable 1 Tablet(s) Chew every 4 hours PRN Dyspepsia  dextrose Oral Gel 15 Gram(s) Oral once PRN Blood Glucose LESS THAN 70 milliGRAM(s)/deciliter  diazepam    Tablet 5 milliGRAM(s) Oral every 6 hours PRN Muscle spasm  diphenhydrAMINE Injectable 25 milliGRAM(s) IV Push every 4 hours PRN Rash and/or Itching  HYDROmorphone  Injectable 0.5 milliGRAM(s) IV Push every 15 minutes PRN Severe Pain (7 - 10)  HYDROmorphone  Injectable 0.25 milliGRAM(s) IV Push every 10 minutes PRN Moderate Pain (4 - 6)  HYDROmorphone  Injectable 0.5 milliGRAM(s) IV Push every 4 hours PRN Severe Pain (7 - 10)  melatonin 3 milliGRAM(s) Oral at bedtime PRN Sleep  metoclopramide 10 milliGRAM(s) Oral every 6 hours PRN breakthrough nausea and vomiting  ondansetron Injectable 4 milliGRAM(s) IV Push every 6 hours PRN Nausea and/or Vomiting  oxyCODONE    IR 5 milliGRAM(s) Oral every 4 hours PRN Moderate Pain (4 - 6)  oxyCODONE    IR 10 milliGRAM(s) Oral every 4 hours PRN Severe Pain (7 - 10)  senna 2 Tablet(s) Oral at bedtime PRN Constipation  simethicone 80 milliGRAM(s) Chew every 6 hours PRN Gas    
PLastic Surgery Progress Note    SUBJECTIVE:  Doing well.   No overnight events.     OBJECTIVE:     ** PHYSICAL EXAM **    -- CONSTITUTIONAL: AOx3. NAD.   -- RIGHT BREAST / FLAP: Mastectomy skin flap ecchymosis, stable. No collections. Flap soft and pink with 2-3 second capillary refill.  Drain(s) serosanguinous. Cook signal strong and clear  -- LEFT BREAST / FLAP: Mastectomy skin flap ecchymosis, stable. No collections. Flap soft and pink with 2-3 second capillary refill.  Drain(s) serosanguinous. Cook signal strong and clear  -- CARDIOVASCULAR: Regular rate  -- RESPIRATORY: unlabored respirations   -- ABDOMEN: Soft. No collections. Incision intact. Umbilicus viable. Drains serosanguinous.    ** VITAL SIGNS / I&O's **    T(C): 36.8 (03-12-25 @ 13:34), Max: 36.8 (03-12-25 @ 13:34)  T(F): 98.2 (03-12-25 @ 13:34), Max: 98.2 (03-12-25 @ 13:34)  HR: 102 (03-12-25 @ 13:34) (75 - 102)  BP: 113/64 (03-12-25 @ 13:34) (89/56 - 159/79)  RR: 19 (03-12-25 @ 13:34) (12 - 21)  SpO2: 98% (03-12-25 @ 13:34) (96% - 100%)      11 Mar 2025 07:01  -  12 Mar 2025 07:00  --------------------------------------------------------  IN:    IV PiggyBack: 250 mL    Lactated Ringers: 1500 mL    Oral Fluid: 100 mL  Total IN: 1850 mL    OUT:    Bulb (mL): 53 mL    Bulb (mL): 44 mL    Bulb (mL): 11 mL    Bulb (mL): 62 mL    Bulb (mL): 28 mL    Bulb (mL): 68 mL    Indwelling Catheter - Urethral (mL): 450 mL  Total OUT: 716 mL    Total NET: 1134 mL      12 Mar 2025 07:01  -  12 Mar 2025 15:05  --------------------------------------------------------  IN:    IV PiggyBack: 150 mL    Lactated Ringers: 150 mL    Oral Fluid: 360 mL  Total IN: 660 mL    OUT:    Bulb (mL): 14.5 mL    Bulb (mL): 63 mL    Bulb (mL): 4.5 mL    Bulb (mL): 25.5 mL    Bulb (mL): 32 mL    Bulb (mL): 32 mL    Indwelling Catheter - Urethral (mL): 125 mL  Total OUT: 296.5 mL    Total NET: 363.5 mL            ** LABS**                 8.6    8.30   )----------(  221       ( 12 Mar 2025 05:27 )               27.0      140    |  107    |  20     ----------------------------<  162        ( 12 Mar 2025 05:27 )  4.4     |  21     |  1.11     Ca    7.8        ( 12 Mar 2025 05:27 )          CAPILLARY BLOOD GLUCOSE      
PLastic Surgery Progress Note    SUBJECTIVE: Examined at bedside.  No overnight events. Ambulated in hallways last night and this m orning.     OBJECTIVE:     Vital Signs Last 24 Hrs  T(C): 36.6 (13 Mar 2025 05:24), Max: 37 (12 Mar 2025 17:30)  T(F): 97.9 (13 Mar 2025 05:24), Max: 98.6 (12 Mar 2025 17:30)  HR: 98 (13 Mar 2025 05:24) (78 - 102)  BP: 118/54 (13 Mar 2025 05:24) (99/74 - 125/60)  BP(mean): 82 (12 Mar 2025 09:00) (67 - 82)  RR: 18 (13 Mar 2025 05:24) (15 - 19)  SpO2: 100% (13 Mar 2025 05:24) (98% - 100%)    PHYSICAL EXAM     -- CONSTITUTIONAL: AOx3. NAD.   -- RIGHT BREAST / FLAP: Mastectomy skin flap ecchymosis, stable. No collections. Flap soft and pink with 2-3 second capillary refill.  Drain(s) serosanguinous. Cook signal strong and clear  -- LEFT BREAST / FLAP: Mastectomy skin flap ecchymosis, stable. No collections. Flap soft and pink with 2-3 second capillary refill.  Drain(s) serosanguinous. Cook signal strong and clear  -- CARDIOVASCULAR: Regular rate  -- RESPIRATORY: unlabored respirations   -- ABDOMEN: Soft. No collections. Incision intact. Umbilicus viable. Drains serosanguinous.    ** VITAL SIGNS / I&O's **    T(C): 36.8 (03-12-25 @ 13:34), Max: 36.8 (03-12-25 @ 13:34)  T(F): 98.2 (03-12-25 @ 13:34), Max: 98.2 (03-12-25 @ 13:34)  HR: 102 (03-12-25 @ 13:34) (75 - 102)  BP: 113/64 (03-12-25 @ 13:34) (89/56 - 159/79)  RR: 19 (03-12-25 @ 13:34) (12 - 21)  SpO2: 98% (03-12-25 @ 13:34) (96% - 100%)      11 Mar 2025 07:01  -  12 Mar 2025 07:00  --------------------------------------------------------  IN:    IV PiggyBack: 250 mL    Lactated Ringers: 1500 mL    Oral Fluid: 100 mL  Total IN: 1850 mL    OUT:    Bulb (mL): 53 mL    Bulb (mL): 44 mL    Bulb (mL): 11 mL    Bulb (mL): 62 mL    Bulb (mL): 28 mL    Bulb (mL): 68 mL    Indwelling Catheter - Urethral (mL): 450 mL  Total OUT: 716 mL    Total NET: 1134 mL      12 Mar 2025 07:01  -  12 Mar 2025 15:05  --------------------------------------------------------  IN:    IV PiggyBack: 150 mL    Lactated Ringers: 150 mL    Oral Fluid: 360 mL  Total IN: 660 mL    OUT:    Bulb (mL): 14.5 mL    Bulb (mL): 63 mL    Bulb (mL): 4.5 mL    Bulb (mL): 25.5 mL    Bulb (mL): 32 mL    Bulb (mL): 32 mL    Indwelling Catheter - Urethral (mL): 125 mL  Total OUT: 296.5 mL    Total NET: 363.5 mL            ** LABS**                 8.6    8.30   )----------(  221       ( 12 Mar 2025 05:27 )               27.0      140    |  107    |  20     ----------------------------<  162        ( 12 Mar 2025 05:27 )  4.4     |  21     |  1.11     Ca    7.8        ( 12 Mar 2025 05:27 )          CAPILLARY BLOOD GLUCOSE

## 2025-03-16 VITALS
DIASTOLIC BLOOD PRESSURE: 76 MMHG | RESPIRATION RATE: 18 BRPM | HEART RATE: 78 BPM | OXYGEN SATURATION: 100 % | SYSTOLIC BLOOD PRESSURE: 132 MMHG | TEMPERATURE: 98 F

## 2025-03-16 LAB
GLUCOSE BLDC GLUCOMTR-MCNC: 136 MG/DL — HIGH (ref 70–99)
GLUCOSE BLDC GLUCOMTR-MCNC: 181 MG/DL — HIGH (ref 70–99)

## 2025-03-16 RX ORDER — ASPIRIN 325 MG
1 TABLET ORAL
Qty: 30 | Refills: 0
Start: 2025-03-16 | End: 2025-04-14

## 2025-03-16 RX ORDER — OXYCODONE HYDROCHLORIDE 30 MG/1
1 TABLET ORAL
Qty: 12 | Refills: 0
Start: 2025-03-16

## 2025-03-16 RX ORDER — CEFADROXIL 500 MG/1
1 CAPSULE ORAL
Qty: 20 | Refills: 0
Start: 2025-03-16 | End: 2025-03-25

## 2025-03-16 RX ADMIN — Medication 400 MILLIGRAM(S): at 00:34

## 2025-03-16 RX ADMIN — Medication 975 MILLIGRAM(S): at 05:32

## 2025-03-16 RX ADMIN — Medication 975 MILLIGRAM(S): at 12:15

## 2025-03-16 RX ADMIN — OXYCODONE HYDROCHLORIDE 10 MILLIGRAM(S): 30 TABLET ORAL at 12:11

## 2025-03-16 RX ADMIN — Medication 400 MILLIGRAM(S): at 11:16

## 2025-03-16 RX ADMIN — INSULIN LISPRO 1: 100 INJECTION, SOLUTION INTRAVENOUS; SUBCUTANEOUS at 07:58

## 2025-03-16 RX ADMIN — Medication 400 MILLIGRAM(S): at 05:32

## 2025-03-16 RX ADMIN — OXYCODONE HYDROCHLORIDE 10 MILLIGRAM(S): 30 TABLET ORAL at 07:57

## 2025-03-16 RX ADMIN — Medication 975 MILLIGRAM(S): at 01:35

## 2025-03-16 RX ADMIN — ENOXAPARIN SODIUM 40 MILLIGRAM(S): 100 INJECTION SUBCUTANEOUS at 14:11

## 2025-03-16 RX ADMIN — Medication 400 MILLIGRAM(S): at 01:35

## 2025-03-16 RX ADMIN — Medication 400 MILLIGRAM(S): at 12:15

## 2025-03-16 RX ADMIN — OXYCODONE HYDROCHLORIDE 10 MILLIGRAM(S): 30 TABLET ORAL at 01:06

## 2025-03-16 RX ADMIN — Medication 975 MILLIGRAM(S): at 11:15

## 2025-03-16 RX ADMIN — OXYCODONE HYDROCHLORIDE 10 MILLIGRAM(S): 30 TABLET ORAL at 13:11

## 2025-03-16 RX ADMIN — OXYCODONE HYDROCHLORIDE 10 MILLIGRAM(S): 30 TABLET ORAL at 08:57

## 2025-03-16 RX ADMIN — Medication 975 MILLIGRAM(S): at 00:35

## 2025-03-16 RX ADMIN — OXYCODONE HYDROCHLORIDE 10 MILLIGRAM(S): 30 TABLET ORAL at 02:06

## 2025-03-17 LAB — SURGICAL PATHOLOGY STUDY: SIGNIFICANT CHANGE UP

## 2025-03-20 ENCOUNTER — APPOINTMENT (OUTPATIENT)
Dept: PLASTIC SURGERY | Facility: CLINIC | Age: 55
End: 2025-03-20
Payer: COMMERCIAL

## 2025-03-20 DIAGNOSIS — Z90.13 ACQUIRED ABSENCE OF BILATERAL BREASTS AND NIPPLES: ICD-10-CM

## 2025-03-20 DIAGNOSIS — Z98.890 OTHER SPECIFIED POSTPROCEDURAL STATES: ICD-10-CM

## 2025-03-20 PROCEDURE — 99024 POSTOP FOLLOW-UP VISIT: CPT

## 2025-03-20 RX ORDER — OXYCODONE 5 MG/1
5 TABLET ORAL EVERY 6 HOURS
Qty: 12 | Refills: 0 | Status: ACTIVE | COMMUNITY
Start: 2025-03-20 | End: 1900-01-01

## 2025-03-21 PROBLEM — Z98.890 POST-OPERATIVE STATE: Status: ACTIVE | Noted: 2025-03-21

## 2025-03-21 PROBLEM — Z90.13 HISTORY OF BILATERAL MASTECTOMY: Status: ACTIVE | Noted: 2025-03-21

## 2025-03-21 PROBLEM — Z98.890 H/O BREAST RECONSTRUCTION: Status: ACTIVE | Noted: 2025-03-21

## 2025-03-27 ENCOUNTER — APPOINTMENT (OUTPATIENT)
Dept: PLASTIC SURGERY | Facility: CLINIC | Age: 55
End: 2025-03-27
Payer: COMMERCIAL

## 2025-03-27 VITALS
SYSTOLIC BLOOD PRESSURE: 129 MMHG | BODY MASS INDEX: 24.75 KG/M2 | WEIGHT: 145 LBS | DIASTOLIC BLOOD PRESSURE: 79 MMHG | HEIGHT: 64 IN | TEMPERATURE: 97.8 F | OXYGEN SATURATION: 99 % | HEART RATE: 91 BPM

## 2025-03-27 DIAGNOSIS — Z98.890 OTHER SPECIFIED POSTPROCEDURAL STATES: ICD-10-CM

## 2025-03-27 DIAGNOSIS — Z90.13 ACQUIRED ABSENCE OF BILATERAL BREASTS AND NIPPLES: ICD-10-CM

## 2025-03-27 PROCEDURE — 99024 POSTOP FOLLOW-UP VISIT: CPT

## 2025-04-01 ENCOUNTER — NON-APPOINTMENT (OUTPATIENT)
Age: 55
End: 2025-04-01

## 2025-04-10 ENCOUNTER — APPOINTMENT (OUTPATIENT)
Dept: PLASTIC SURGERY | Facility: CLINIC | Age: 55
End: 2025-04-10
Payer: COMMERCIAL

## 2025-04-10 ENCOUNTER — NON-APPOINTMENT (OUTPATIENT)
Age: 55
End: 2025-04-10

## 2025-04-10 VITALS
WEIGHT: 145 LBS | HEIGHT: 64 IN | DIASTOLIC BLOOD PRESSURE: 83 MMHG | SYSTOLIC BLOOD PRESSURE: 158 MMHG | BODY MASS INDEX: 24.75 KG/M2 | OXYGEN SATURATION: 99 % | TEMPERATURE: 97.9 F | HEART RATE: 93 BPM | RESPIRATION RATE: 16 BRPM

## 2025-04-10 DIAGNOSIS — Z90.13 ACQUIRED ABSENCE OF BILATERAL BREASTS AND NIPPLES: ICD-10-CM

## 2025-04-10 DIAGNOSIS — Z98.890 OTHER SPECIFIED POSTPROCEDURAL STATES: ICD-10-CM

## 2025-04-10 PROCEDURE — 99024 POSTOP FOLLOW-UP VISIT: CPT

## 2025-04-20 ENCOUNTER — OUTPATIENT (OUTPATIENT)
Dept: OUTPATIENT SERVICES | Facility: HOSPITAL | Age: 55
LOS: 1 days | Discharge: HOME | End: 2025-04-20

## 2025-04-20 DIAGNOSIS — Z98.890 OTHER SPECIFIED POSTPROCEDURAL STATES: Chronic | ICD-10-CM

## 2025-04-20 DIAGNOSIS — Z96.0 PRESENCE OF UROGENITAL IMPLANTS: Chronic | ICD-10-CM

## 2025-04-20 DIAGNOSIS — C50.211 MALIGNANT NEOPLASM OF UPPER-INNER QUADRANT OF RIGHT FEMALE BREAST: ICD-10-CM

## 2025-04-22 ENCOUNTER — NON-APPOINTMENT (OUTPATIENT)
Age: 55
End: 2025-04-22

## 2025-04-22 ENCOUNTER — APPOINTMENT (OUTPATIENT)
Dept: HEMATOLOGY ONCOLOGY | Facility: CLINIC | Age: 55
End: 2025-04-22
Payer: COMMERCIAL

## 2025-04-22 VITALS
OXYGEN SATURATION: 100 % | WEIGHT: 141.09 LBS | RESPIRATION RATE: 16 BRPM | HEART RATE: 90 BPM | DIASTOLIC BLOOD PRESSURE: 74 MMHG | TEMPERATURE: 97.7 F | SYSTOLIC BLOOD PRESSURE: 111 MMHG | HEIGHT: 63 IN | BODY MASS INDEX: 25 KG/M2

## 2025-04-22 DIAGNOSIS — Z87.42 PERSONAL HISTORY OF OTHER DISEASES OF THE FEMALE GENITAL TRACT: ICD-10-CM

## 2025-04-22 DIAGNOSIS — Z56.0 UNEMPLOYMENT, UNSPECIFIED: ICD-10-CM

## 2025-04-22 DIAGNOSIS — E55.9 VITAMIN D DEFICIENCY, UNSPECIFIED: ICD-10-CM

## 2025-04-22 DIAGNOSIS — Z85.3 PERSONAL HISTORY OF MALIGNANT NEOPLASM OF BREAST: ICD-10-CM

## 2025-04-22 DIAGNOSIS — E11.9 TYPE 2 DIABETES MELLITUS W/OUT COMPLICATIONS: ICD-10-CM

## 2025-04-22 DIAGNOSIS — E78.00 PURE HYPERCHOLESTEROLEMIA, UNSPECIFIED: ICD-10-CM

## 2025-04-22 DIAGNOSIS — Z17.0 MALIGNANT NEOPLASM OF UPPER-INNER QUADRANT OF RIGHT FEMALE BREAST: ICD-10-CM

## 2025-04-22 DIAGNOSIS — Z80.3 FAMILY HISTORY OF MALIGNANT NEOPLASM OF BREAST: ICD-10-CM

## 2025-04-22 DIAGNOSIS — C50.211 MALIGNANT NEOPLASM OF UPPER-INNER QUADRANT OF RIGHT FEMALE BREAST: ICD-10-CM

## 2025-04-22 DIAGNOSIS — I10 ESSENTIAL (PRIMARY) HYPERTENSION: ICD-10-CM

## 2025-04-22 PROCEDURE — 99205 OFFICE O/P NEW HI 60 MIN: CPT

## 2025-04-22 PROCEDURE — 99417 PROLNG OP E/M EACH 15 MIN: CPT

## 2025-04-22 RX ORDER — ANASTROZOLE TABLETS 1 MG/1
1 TABLET ORAL
Qty: 90 | Refills: 1 | Status: ACTIVE | COMMUNITY
Start: 2025-04-22 | End: 1900-01-01

## 2025-04-22 RX ORDER — GLIMEPIRIDE 2 MG/1
2 TABLET ORAL DAILY
Refills: 0 | Status: DISCONTINUED | COMMUNITY
Start: 2025-04-22 | End: 2025-04-22

## 2025-04-22 RX ORDER — ROSUVASTATIN CALCIUM 10 MG/1
10 TABLET, FILM COATED ORAL DAILY
Qty: 90 | Refills: 3 | Status: ACTIVE | COMMUNITY
Start: 2025-04-22

## 2025-04-22 RX ORDER — ERGOCALCIFEROL 1.25 MG/1
1.25 MG CAPSULE, LIQUID FILLED ORAL
Refills: 0 | Status: ACTIVE | COMMUNITY
Start: 2025-04-22

## 2025-04-22 RX ORDER — DAPAGLIFLOZIN AND METFORMIN HYDROCHLORIDE 10; 500 MG/1; MG/1
10-500 TABLET, FILM COATED, EXTENDED RELEASE ORAL
Refills: 0 | Status: ACTIVE | COMMUNITY
Start: 2025-04-22

## 2025-04-22 RX ORDER — LINAGLIPTIN 5 MG/1
5 TABLET, FILM COATED ORAL DAILY
Qty: 30 | Refills: 0 | Status: DISCONTINUED | COMMUNITY
Start: 2025-04-22 | End: 2025-04-22

## 2025-04-22 RX ORDER — GLIPIZIDE 5 MG/1
5 TABLET ORAL DAILY
Refills: 0 | Status: ACTIVE | COMMUNITY
Start: 2025-04-22

## 2025-04-22 RX ORDER — NEBIVOLOL 5 MG/1
5 TABLET ORAL DAILY
Refills: 0 | Status: ACTIVE | COMMUNITY
Start: 2025-04-22

## 2025-04-22 SDOH — ECONOMIC STABILITY - INCOME SECURITY: UNEMPLOYMENT, UNSPECIFIED: Z56.0

## 2025-05-01 ENCOUNTER — APPOINTMENT (OUTPATIENT)
Dept: PLASTIC SURGERY | Facility: CLINIC | Age: 55
End: 2025-05-01

## 2025-05-01 VITALS
TEMPERATURE: 97.9 F | WEIGHT: 141 LBS | DIASTOLIC BLOOD PRESSURE: 90 MMHG | SYSTOLIC BLOOD PRESSURE: 138 MMHG | HEART RATE: 100 BPM | BODY MASS INDEX: 24.07 KG/M2 | HEIGHT: 64 IN | OXYGEN SATURATION: 98 %

## 2025-05-08 NOTE — H&P PST ADULT - GRAV[place], OB PROFILE
Pt ambulated att with pulse ox. O2 did not drop below 98% and pt denies SOB  
Pt presents ambulatory to triage w/ complaints of intermittent SOB x2 weeks that worsens upon ambulation or climbing the stairs. Denies CP, dizziness, or fevers at home   
4

## 2025-06-05 ENCOUNTER — APPOINTMENT (OUTPATIENT)
Dept: PLASTIC SURGERY | Facility: CLINIC | Age: 55
End: 2025-06-05

## 2025-06-05 VITALS
SYSTOLIC BLOOD PRESSURE: 134 MMHG | TEMPERATURE: 98 F | BODY MASS INDEX: 24.07 KG/M2 | WEIGHT: 141 LBS | HEART RATE: 90 BPM | HEIGHT: 64 IN | DIASTOLIC BLOOD PRESSURE: 84 MMHG | OXYGEN SATURATION: 99 %

## 2025-07-03 ENCOUNTER — OUTPATIENT (OUTPATIENT)
Dept: OUTPATIENT SERVICES | Facility: HOSPITAL | Age: 55
LOS: 1 days | Discharge: HOME | End: 2025-07-03

## 2025-07-03 DIAGNOSIS — C50.211 MALIGNANT NEOPLASM OF UPPER-INNER QUADRANT OF RIGHT FEMALE BREAST: ICD-10-CM

## 2025-07-03 DIAGNOSIS — Z96.0 PRESENCE OF UROGENITAL IMPLANTS: Chronic | ICD-10-CM

## 2025-07-03 DIAGNOSIS — Z98.890 OTHER SPECIFIED POSTPROCEDURAL STATES: Chronic | ICD-10-CM

## 2025-07-08 ENCOUNTER — APPOINTMENT (OUTPATIENT)
Dept: HEMATOLOGY ONCOLOGY | Facility: CLINIC | Age: 55
End: 2025-07-08
Payer: COMMERCIAL

## 2025-07-08 PROCEDURE — 99214 OFFICE O/P EST MOD 30 MIN: CPT | Mod: 95

## 2025-07-08 PROCEDURE — G2211 COMPLEX E/M VISIT ADD ON: CPT | Mod: NC,95

## 2025-08-01 ENCOUNTER — NON-APPOINTMENT (OUTPATIENT)
Age: 55
End: 2025-08-01

## 2025-08-24 ENCOUNTER — NON-APPOINTMENT (OUTPATIENT)
Age: 55
End: 2025-08-24

## 2025-08-28 ENCOUNTER — NON-APPOINTMENT (OUTPATIENT)
Age: 55
End: 2025-08-28

## 2025-09-04 ENCOUNTER — OUTPATIENT (OUTPATIENT)
Dept: OUTPATIENT SERVICES | Facility: HOSPITAL | Age: 55
LOS: 1 days | End: 2025-09-04
Payer: COMMERCIAL

## 2025-09-04 VITALS
RESPIRATION RATE: 14 BRPM | WEIGHT: 136.91 LBS | HEART RATE: 91 BPM | DIASTOLIC BLOOD PRESSURE: 90 MMHG | HEIGHT: 63 IN | TEMPERATURE: 98 F | SYSTOLIC BLOOD PRESSURE: 157 MMHG | OXYGEN SATURATION: 98 %

## 2025-09-04 DIAGNOSIS — Z96.0 PRESENCE OF UROGENITAL IMPLANTS: Chronic | ICD-10-CM

## 2025-09-04 DIAGNOSIS — Z98.890 OTHER SPECIFIED POSTPROCEDURAL STATES: Chronic | ICD-10-CM

## 2025-09-04 DIAGNOSIS — E11.9 TYPE 2 DIABETES MELLITUS WITHOUT COMPLICATIONS: ICD-10-CM

## 2025-09-04 DIAGNOSIS — N65.0 DEFORMITY OF RECONSTRUCTED BREAST: ICD-10-CM

## 2025-09-04 DIAGNOSIS — Z90.13 ACQUIRED ABSENCE OF BILATERAL BREASTS AND NIPPLES: Chronic | ICD-10-CM

## 2025-09-04 DIAGNOSIS — Z90.13 ACQUIRED ABSENCE OF BILATERAL BREASTS AND NIPPLES: ICD-10-CM

## 2025-09-04 DIAGNOSIS — Z01.818 ENCOUNTER FOR OTHER PREPROCEDURAL EXAMINATION: ICD-10-CM

## 2025-09-04 DIAGNOSIS — Z98.890 OTHER SPECIFIED POSTPROCEDURAL STATES: ICD-10-CM

## 2025-09-04 LAB
ANION GAP SERPL CALC-SCNC: 15 MMOL/L — SIGNIFICANT CHANGE UP (ref 5–17)
BUN SERPL-MCNC: 17 MG/DL — SIGNIFICANT CHANGE UP (ref 7–23)
CALCIUM SERPL-MCNC: 9.4 MG/DL — SIGNIFICANT CHANGE UP (ref 8.4–10.5)
CHLORIDE SERPL-SCNC: 96 MMOL/L — SIGNIFICANT CHANGE UP (ref 96–108)
CO2 SERPL-SCNC: 23 MMOL/L — SIGNIFICANT CHANGE UP (ref 22–31)
CREAT SERPL-MCNC: 0.9 MG/DL — SIGNIFICANT CHANGE UP (ref 0.5–1.3)
EGFR: 75 ML/MIN/1.73M2 — SIGNIFICANT CHANGE UP
EGFR: 75 ML/MIN/1.73M2 — SIGNIFICANT CHANGE UP
GLUCOSE SERPL-MCNC: 409 MG/DL — HIGH (ref 70–99)
HCT VFR BLD CALC: 38.7 % — SIGNIFICANT CHANGE UP (ref 34.5–45)
HGB BLD-MCNC: 12.1 G/DL — SIGNIFICANT CHANGE UP (ref 11.5–15.5)
MCHC RBC-ENTMCNC: 25.7 PG — LOW (ref 27–34)
MCHC RBC-ENTMCNC: 31.3 G/DL — LOW (ref 32–36)
MCV RBC AUTO: 82.3 FL — SIGNIFICANT CHANGE UP (ref 80–100)
NRBC # BLD AUTO: 0 K/UL — SIGNIFICANT CHANGE UP (ref 0–0)
NRBC # FLD: 0 K/UL — SIGNIFICANT CHANGE UP (ref 0–0)
NRBC BLD AUTO-RTO: 0 /100 WBCS — SIGNIFICANT CHANGE UP (ref 0–0)
PLATELET # BLD AUTO: 361 K/UL — SIGNIFICANT CHANGE UP (ref 150–400)
PMV BLD: 10.8 FL — SIGNIFICANT CHANGE UP (ref 7–13)
POTASSIUM SERPL-MCNC: 4.4 MMOL/L — SIGNIFICANT CHANGE UP (ref 3.5–5.3)
POTASSIUM SERPL-SCNC: 4.4 MMOL/L — SIGNIFICANT CHANGE UP (ref 3.5–5.3)
RBC # BLD: 4.7 M/UL — SIGNIFICANT CHANGE UP (ref 3.8–5.2)
RBC # FLD: 13 % — SIGNIFICANT CHANGE UP (ref 10.3–14.5)
SODIUM SERPL-SCNC: 134 MMOL/L — LOW (ref 135–145)
WBC # BLD: 10.48 K/UL — SIGNIFICANT CHANGE UP (ref 3.8–10.5)
WBC # FLD AUTO: 10.48 K/UL — SIGNIFICANT CHANGE UP (ref 3.8–10.5)

## 2025-09-04 PROCEDURE — 85027 COMPLETE CBC AUTOMATED: CPT

## 2025-09-04 PROCEDURE — G0463: CPT

## 2025-09-04 PROCEDURE — 83036 HEMOGLOBIN GLYCOSYLATED A1C: CPT

## 2025-09-04 PROCEDURE — 80048 BASIC METABOLIC PNL TOTAL CA: CPT

## 2025-09-04 RX ORDER — ANASTROZOLE 1 MG/1
1 TABLET ORAL
Refills: 0 | DISCHARGE

## 2025-09-05 ENCOUNTER — APPOINTMENT (OUTPATIENT)
Dept: FAMILY MEDICINE | Facility: CLINIC | Age: 55
End: 2025-09-05
Payer: COMMERCIAL

## 2025-09-05 VITALS
HEART RATE: 103 BPM | RESPIRATION RATE: 16 BRPM | OXYGEN SATURATION: 99 % | DIASTOLIC BLOOD PRESSURE: 108 MMHG | SYSTOLIC BLOOD PRESSURE: 168 MMHG | BODY MASS INDEX: 23.56 KG/M2 | HEIGHT: 64 IN | WEIGHT: 138 LBS

## 2025-09-05 DIAGNOSIS — Z00.00 ENCOUNTER FOR GENERAL ADULT MEDICAL EXAMINATION W/OUT ABNORMAL FINDINGS: ICD-10-CM

## 2025-09-05 DIAGNOSIS — I10 ESSENTIAL (PRIMARY) HYPERTENSION: ICD-10-CM

## 2025-09-05 DIAGNOSIS — E11.9 TYPE 2 DIABETES MELLITUS W/OUT COMPLICATIONS: ICD-10-CM

## 2025-09-05 DIAGNOSIS — E78.00 PURE HYPERCHOLESTEROLEMIA, UNSPECIFIED: ICD-10-CM

## 2025-09-05 LAB
A1C WITH ESTIMATED AVERAGE GLUCOSE RESULT: 11.6 % — HIGH (ref 4–5.6)
ESTIMATED AVERAGE GLUCOSE: 286 MG/DL — HIGH (ref 68–114)

## 2025-09-05 PROCEDURE — 99386 PREV VISIT NEW AGE 40-64: CPT

## 2025-09-05 PROCEDURE — 36415 COLL VENOUS BLD VENIPUNCTURE: CPT

## 2025-09-05 RX ORDER — FLASH GLUCOSE SCANNING READER
EACH MISCELLANEOUS
Qty: 1 | Refills: 0 | Status: ACTIVE | COMMUNITY
Start: 2025-09-05 | End: 1900-01-01

## 2025-09-08 LAB
25(OH)D3 SERPL-MCNC: 40.5 NG/ML
CHOLEST SERPL-MCNC: 150 MG/DL
CREAT SPEC-SCNC: 39 MG/DL
CREAT/PROT UR: 0.2 RATIO
HDLC SERPL-MCNC: 40 MG/DL
LDLC SERPL-MCNC: 53 MG/DL
NONHDLC SERPL-MCNC: 109 MG/DL
PROT UR-MCNC: 7 MG/DL
TRIGL SERPL-MCNC: 377 MG/DL
TSH SERPL-ACNC: 2.35 UIU/ML

## 2025-09-08 RX ORDER — RAMIPRIL 2.5 MG/1
2.5 CAPSULE ORAL DAILY
Refills: 0 | Status: ACTIVE | COMMUNITY
Start: 2025-09-08

## 2025-09-12 ENCOUNTER — APPOINTMENT (OUTPATIENT)
Dept: PLASTIC SURGERY | Facility: HOSPITAL | Age: 55
End: 2025-09-12

## (undated) DEVICE — XI ARM SCISSOR ROUND TIP 8MM

## (undated) DEVICE — DRAPE TOWEL BLUE 17" X 24"

## (undated) DEVICE — ADAPTER UROLOK

## (undated) DEVICE — DRAPE CHEST BREAST 106" X 122"

## (undated) DEVICE — VENODYNE/SCD SLEEVE CALF MEDIUM

## (undated) DEVICE — TUBING SET TUR BLADDER IRRIGATION Y-TYPE 81"

## (undated) DEVICE — SUT POLYSORB 3-0 30" V-20 UNDYED

## (undated) DEVICE — TUBING RANGER FLUID IRRIGATION SET DISP

## (undated) DEVICE — PACK FREE FLAP

## (undated) DEVICE — BIPOLAR FORCEP KIRWAN JEWELERS STR 4" X 0.4MM W 12FT CORD (GREEN)

## (undated) DEVICE — SYR LUER LOK 3CC

## (undated) DEVICE — SPECIMEN CONTAINER 100ML

## (undated) DEVICE — NDL COUNTER FOAM AND MAGNET 20-40

## (undated) DEVICE — Device

## (undated) DEVICE — DRAPE WARMING SOLUTION 44 X 44"

## (undated) DEVICE — TUBING TUR 2 PRONG

## (undated) DEVICE — ELCTR BOVIE PENCIL SMOKE EVACUATION

## (undated) DEVICE — SUT MONOCRYL 3-0 27" PS-2 UNDYED

## (undated) DEVICE — DRSG OPSITE 2.5 X 2"

## (undated) DEVICE — SOL IRR POUR H2O 250ML

## (undated) DEVICE — POSITIONER FOAM EGG CRATE ULNAR 2PCS (PINK)

## (undated) DEVICE — GOWN LG

## (undated) DEVICE — DRSG TEGADERM 4 X 4.75"

## (undated) DEVICE — DRAPE MICROSCOPE LEICA 54" X 150"

## (undated) DEVICE — WARMING BLANKET LOWER ADULT

## (undated) DEVICE — DRSG CURITY GAUZE SPONGE 4 X 4" 12-PLY

## (undated) DEVICE — SYR LUER LOK 5CC

## (undated) DEVICE — DRAPE 3/4 SHEET 52X76"

## (undated) DEVICE — DRAIN JACKSON PRATT 10MM FLAT FULL NO TROCAR

## (undated) DEVICE — SYR ASEPTO

## (undated) DEVICE — BLANKET WARMER UPPER ADULT

## (undated) DEVICE — LONE STAR ELASTIC STAY HOOK 12MM BLUNT

## (undated) DEVICE — CANNULA ANT CHMBR 27GX22MM

## (undated) DEVICE — DRAPE LIGHT HANDLE COVER (BLUE)

## (undated) DEVICE — DRSG DERMABOND PRINEO 60CM

## (undated) DEVICE — SOL IRR BAG NS 0.9% 3000ML

## (undated) DEVICE — CLAMP MICROVASCULAR SINGLE  1-2MM

## (undated) DEVICE — POSITIONER STRAP ARMBOARD VELCRO TS-30

## (undated) DEVICE — VENODYNE/SCD SLEEVE CALF LARGE

## (undated) DEVICE — LIGASURE SMALL JAW

## (undated) DEVICE — GOWN XL

## (undated) DEVICE — DRSG TELFA 3 X 8

## (undated) DEVICE — SUT QUILL MONODERM 2-0 30CM 19MM

## (undated) DEVICE — SOL IRR POUR H2O 1500ML

## (undated) DEVICE — SUT VICRYL 2-0 18" CT-1 UNDYED (POP-OFF)

## (undated) DEVICE — ELCTR GROUNDING PAD ADULT COVIDIEN

## (undated) DEVICE — SUT SOFSILK 2-0 18" C-23

## (undated) DEVICE — WARMING BLANKET UPPER ADULT

## (undated) DEVICE — DRAPE ISOLATION BAG 20X20"

## (undated) DEVICE — SPEAR SURG EYE WECK-CELL CELOS

## (undated) DEVICE — SOL IRR POUR NS 0.9% 1500ML

## (undated) DEVICE — DOPPLER PROBE  CABLE

## (undated) DEVICE — DRAPE C ARM UNIVERSAL

## (undated) DEVICE — TUBING TRUWAVE PRESSURE MALE/FEMALE 12"

## (undated) DEVICE — DRAPE ARMATEC MICROSSCOPE HANDLE 5" X 10"

## (undated) DEVICE — ULTRASOUND GEL 0.25L

## (undated) DEVICE — SYR LUER LOK 20CC

## (undated) DEVICE — GLV 7.5 PROTEXIS

## (undated) DEVICE — ELCTR BOVIE PENCIL BLADE 10FT

## (undated) DEVICE — SUT QUILL PDO 2 30CM 36MM

## (undated) DEVICE — SUT NYLON 2-0 18" FS

## (undated) DEVICE — SOL IRR POUR NS 0.9% 500ML

## (undated) DEVICE — MERCIAN VISABILITY BACKROUND YELLOW

## (undated) DEVICE — PACK CYSTO

## (undated) DEVICE — TUBING SUCTION 20FT

## (undated) DEVICE — DRAPE MAYO STAND 30"

## (undated) DEVICE — FOLEY TRAY 16FR 5CC LF UMETER CLOSED

## (undated) DEVICE — PREP CHLORAPREP HI-LITE ORANGE 26ML

## (undated) DEVICE — BASIN SET DOUBLE

## (undated) DEVICE — SUT VICRYL 0 27" SH UNDYED

## (undated) DEVICE — SUT ETHILON 8-0 5" BV130-5

## (undated) DEVICE — DRSG TEGADERM 6 X 8"

## (undated) DEVICE — DRAPE INSTRUMENT POUCH 6.75" X 11"

## (undated) DEVICE — SOL IRR BAG H2O 3000ML

## (undated) DEVICE — DRAPE 1/2 SHEET 40X57"

## (undated) DEVICE — DRSG DERMABOND PRINEO 22CM

## (undated) DEVICE — FOLEY TRAY 16FR 5CC LTX UMETER CLOSED

## (undated) DEVICE — XI ARM CLIP APPLIER SMALL

## (undated) DEVICE — SUT PDS II 0 36" CT-1

## (undated) DEVICE — DRSG STERISTRIPS 0.5 X 4"

## (undated) DEVICE — PACK MAJOR ABDOMINAL WITH LAP

## (undated) DEVICE — SUT BIOSYN 4-0 18" P-12

## (undated) DEVICE — SYR LUER LOK 10CC

## (undated) DEVICE — STAPLER SKIN VISI-STAT 35 WIDE

## (undated) DEVICE — BLADE SCALPEL SAFETYLOCK #10

## (undated) DEVICE — DRSG OPSITE 13.75 X 4"

## (undated) DEVICE — DRSG KLING 4"

## (undated) DEVICE — SUCTION YANKAUER NO CONTROL VENT

## (undated) DEVICE — SUT MONOCRYL 5-0 18" P-3 UNDYED

## (undated) DEVICE — GLV 6.5 PROTEXIS (WHITE)

## (undated) DEVICE — Q TIP 6" WOOD STEM

## (undated) DEVICE — LAP PAD W RING 18 X 18"

## (undated) DEVICE — MARKING PEN W RULER

## (undated) DEVICE — DRSG BIOPATCH DISK W CHG 1" W 4.0MM HOLE

## (undated) DEVICE — LABELS BLANK W PEN

## (undated) DEVICE — WRAP COMPRESSION CALF MED

## (undated) DEVICE — LAP PAD 18 X 18"

## (undated) DEVICE — ELCTR BOVIE TIP BLADE INSULATED 2.75" EDGE

## (undated) DEVICE — DRSG DERMABOND 0.7ML

## (undated) DEVICE — DRAPE MAYO STAND 23"

## (undated) DEVICE — DRAIN BLAKE 15FR BARD CHANNEL

## (undated) DEVICE — ELCTR BOVIE TIP BLADE INSULATED 6.5" EDGE

## (undated) DEVICE — MEDICATION LABELS W MARKER

## (undated) DEVICE — BLADE SCALPEL SAFETYLOCK #15

## (undated) DEVICE — DRSG XEROFORM 5 X 9"

## (undated) DEVICE — SHEATH SURG GUIDE SCOUT DISP STRL

## (undated) DEVICE — DRAIN RESERVOIR FOR JACKSON PRATT 100CC CARDINAL

## (undated) DEVICE — GLV 7.5 PROTEXIS (WHITE)